# Patient Record
Sex: MALE | Race: BLACK OR AFRICAN AMERICAN | NOT HISPANIC OR LATINO | Employment: FULL TIME | URBAN - METROPOLITAN AREA
[De-identification: names, ages, dates, MRNs, and addresses within clinical notes are randomized per-mention and may not be internally consistent; named-entity substitution may affect disease eponyms.]

---

## 2021-03-25 DIAGNOSIS — Z23 ENCOUNTER FOR IMMUNIZATION: ICD-10-CM

## 2021-03-29 ENCOUNTER — IMMUNIZATIONS (OUTPATIENT)
Dept: FAMILY MEDICINE CLINIC | Facility: HOSPITAL | Age: 56
End: 2021-03-29

## 2021-03-29 DIAGNOSIS — Z23 ENCOUNTER FOR IMMUNIZATION: Primary | ICD-10-CM

## 2021-03-29 PROCEDURE — 91300 SARS-COV-2 / COVID-19 MRNA VACCINE (PFIZER-BIONTECH) 30 MCG: CPT

## 2021-03-29 PROCEDURE — 0001A SARS-COV-2 / COVID-19 MRNA VACCINE (PFIZER-BIONTECH) 30 MCG: CPT

## 2021-04-19 ENCOUNTER — IMMUNIZATIONS (OUTPATIENT)
Dept: FAMILY MEDICINE CLINIC | Facility: HOSPITAL | Age: 56
End: 2021-04-19

## 2021-04-19 DIAGNOSIS — Z23 ENCOUNTER FOR IMMUNIZATION: Primary | ICD-10-CM

## 2021-04-19 PROCEDURE — 0002A SARS-COV-2 / COVID-19 MRNA VACCINE (PFIZER-BIONTECH) 30 MCG: CPT

## 2021-04-19 PROCEDURE — 91300 SARS-COV-2 / COVID-19 MRNA VACCINE (PFIZER-BIONTECH) 30 MCG: CPT

## 2021-05-10 ENCOUNTER — CONSULT (OUTPATIENT)
Dept: SURGERY | Facility: CLINIC | Age: 56
End: 2021-05-10
Payer: COMMERCIAL

## 2021-05-10 VITALS — TEMPERATURE: 98.2 F | BODY MASS INDEX: 27.4 KG/M2 | HEIGHT: 69 IN | WEIGHT: 185 LBS

## 2021-05-10 DIAGNOSIS — K40.90 RIGHT INGUINAL HERNIA: Primary | ICD-10-CM

## 2021-05-10 PROCEDURE — 99202 OFFICE O/P NEW SF 15 MIN: CPT | Performed by: SURGERY

## 2021-05-10 RX ORDER — METHOCARBAMOL 500 MG/1
500 TABLET, FILM COATED ORAL 4 TIMES DAILY
COMMUNITY

## 2021-05-10 NOTE — PROGRESS NOTES
Office Visit - General Surgery  Anibal Carrillo MRN: [id number]  Encounter: [id number]    Assessment and Plan    54 yo M with PMH of HTN who presents with a right inguinal hernia    Plan: Will proceed with open right inguinal hernia repair at earliest mutual convenience    Chief Complaint:  Anibal Carrillo is a 55 y o  male who presents for Hernia (Consult right inguinal hernia )    Subjective  Patient states he has had pain in his right groin for 6 months  This has been intermittent and worsening over that time frame  It is worse when active and improves with rest/lying flat  He denies obstructive symptoms, skin changes or a palpable lump in the groin  Past Medical History  HTN  No other pertinent past medical history  Past Surgical History  History reviewed  No pertinent surgical history  Family History  History reviewed  No pertinent family history      Social History  Social History     Socioeconomic History    Marital status: Single     Spouse name: None    Number of children: None    Years of education: None    Highest education level: None   Occupational History    None   Social Needs    Financial resource strain: None    Food insecurity     Worry: None     Inability: None    Transportation needs     Medical: None     Non-medical: None   Tobacco Use    Smoking status: Current Some Day Smoker    Smokeless tobacco: Never Used   Substance and Sexual Activity    Alcohol use: Yes     Frequency: 4 or more times a week     Drinks per session: 1 or 2     Binge frequency: Less than monthly    Drug use: Never    Sexual activity: None   Lifestyle    Physical activity     Days per week: None     Minutes per session: None    Stress: None   Relationships    Social connections     Talks on phone: None     Gets together: None     Attends Denominational service: None     Active member of club or organization: None     Attends meetings of clubs or organizations: None     Relationship status: None    Intimate partner violence     Fear of current or ex partner: None     Emotionally abused: None     Physically abused: None     Forced sexual activity: None   Other Topics Concern    None   Social History Narrative    None        Medications  Current Outpatient Medications on File Prior to Visit   Medication Sig Dispense Refill    methocarbamol (ROBAXIN) 500 mg tablet Take 500 mg by mouth 4 (four) times a day       No current facility-administered medications on file prior to visit  Allergies  No Known Allergies    Review of Systems   Constitutional: Negative  HENT: Negative  Eyes: Negative  Respiratory: Negative  Cardiovascular: Negative  Gastrointestinal:        Right groin pain   Endocrine: Negative  Genitourinary: Negative  Musculoskeletal: Negative  Skin: Negative  Neurological: Negative  Psychiatric/Behavioral: Negative  Objective  Vitals:    05/10/21 0922   Temp: 98 2 °F (36 8 °C)       Physical Exam  Constitutional:       Appearance: Normal appearance  HENT:      Head: Normocephalic and atraumatic  Right Ear: External ear normal       Left Ear: External ear normal       Nose: Nose normal       Mouth/Throat:      Mouth: Mucous membranes are moist       Pharynx: Oropharynx is clear  Eyes:      Extraocular Movements: Extraocular movements intact  Conjunctiva/sclera: Conjunctivae normal       Pupils: Pupils are equal, round, and reactive to light  Neck:      Musculoskeletal: Normal range of motion  Cardiovascular:      Rate and Rhythm: Normal rate and regular rhythm  Pulses: Normal pulses  Pulmonary:      Effort: Pulmonary effort is normal    Abdominal:      General: Abdomen is flat  Palpations: Abdomen is soft  Hernia: A hernia (Right inguinal hernia) is present  Musculoskeletal: Normal range of motion  Skin:     General: Skin is warm and dry  Neurological:      General: No focal deficit present        Mental Status: He is alert and oriented to person, place, and time     Psychiatric:         Mood and Affect: Mood normal          Behavior: Behavior normal

## 2021-05-10 NOTE — H&P (VIEW-ONLY)
Office Visit - General Surgery  Gracie Zamorano MRN: [id number]  Encounter: [id number]    Assessment and Plan    56 yo M with PMH of HTN who presents with a right inguinal hernia    Plan: Will proceed with open right inguinal hernia repair at earliest mutual convenience    Chief Complaint:  Gracie Zamorano is a 55 y o  male who presents for Hernia (Consult right inguinal hernia )    Subjective  Patient states he has had pain in his right groin for 6 months  This has been intermittent and worsening over that time frame  It is worse when active and improves with rest/lying flat  He denies obstructive symptoms, skin changes or a palpable lump in the groin  Past Medical History  HTN  No other pertinent past medical history  Past Surgical History  History reviewed  No pertinent surgical history  Family History  History reviewed  No pertinent family history      Social History  Social History     Socioeconomic History    Marital status: Single     Spouse name: None    Number of children: None    Years of education: None    Highest education level: None   Occupational History    None   Social Needs    Financial resource strain: None    Food insecurity     Worry: None     Inability: None    Transportation needs     Medical: None     Non-medical: None   Tobacco Use    Smoking status: Current Some Day Smoker    Smokeless tobacco: Never Used   Substance and Sexual Activity    Alcohol use: Yes     Frequency: 4 or more times a week     Drinks per session: 1 or 2     Binge frequency: Less than monthly    Drug use: Never    Sexual activity: None   Lifestyle    Physical activity     Days per week: None     Minutes per session: None    Stress: None   Relationships    Social connections     Talks on phone: None     Gets together: None     Attends Mu-ism service: None     Active member of club or organization: None     Attends meetings of clubs or organizations: None     Relationship status: None    Intimate partner violence     Fear of current or ex partner: None     Emotionally abused: None     Physically abused: None     Forced sexual activity: None   Other Topics Concern    None   Social History Narrative    None        Medications  Current Outpatient Medications on File Prior to Visit   Medication Sig Dispense Refill    methocarbamol (ROBAXIN) 500 mg tablet Take 500 mg by mouth 4 (four) times a day       No current facility-administered medications on file prior to visit  Allergies  No Known Allergies    Review of Systems   Constitutional: Negative  HENT: Negative  Eyes: Negative  Respiratory: Negative  Cardiovascular: Negative  Gastrointestinal:        Right groin pain   Endocrine: Negative  Genitourinary: Negative  Musculoskeletal: Negative  Skin: Negative  Neurological: Negative  Psychiatric/Behavioral: Negative  Objective  Vitals:    05/10/21 0922   Temp: 98 2 °F (36 8 °C)       Physical Exam  Constitutional:       Appearance: Normal appearance  HENT:      Head: Normocephalic and atraumatic  Right Ear: External ear normal       Left Ear: External ear normal       Nose: Nose normal       Mouth/Throat:      Mouth: Mucous membranes are moist       Pharynx: Oropharynx is clear  Eyes:      Extraocular Movements: Extraocular movements intact  Conjunctiva/sclera: Conjunctivae normal       Pupils: Pupils are equal, round, and reactive to light  Neck:      Musculoskeletal: Normal range of motion  Cardiovascular:      Rate and Rhythm: Normal rate and regular rhythm  Pulses: Normal pulses  Pulmonary:      Effort: Pulmonary effort is normal    Abdominal:      General: Abdomen is flat  Palpations: Abdomen is soft  Hernia: A hernia (Right inguinal hernia) is present  Musculoskeletal: Normal range of motion  Skin:     General: Skin is warm and dry  Neurological:      General: No focal deficit present        Mental Status: He is alert and oriented to person, place, and time     Psychiatric:         Mood and Affect: Mood normal          Behavior: Behavior normal

## 2021-05-17 ENCOUNTER — ANESTHESIA EVENT (OUTPATIENT)
Dept: PERIOP | Facility: HOSPITAL | Age: 56
End: 2021-05-17
Payer: COMMERCIAL

## 2021-05-18 ENCOUNTER — HOSPITAL ENCOUNTER (OUTPATIENT)
Facility: HOSPITAL | Age: 56
Setting detail: OUTPATIENT SURGERY
Discharge: HOME | End: 2021-05-18
Attending: SURGERY | Admitting: SURGERY
Payer: COMMERCIAL

## 2021-05-18 ENCOUNTER — ANESTHESIA (OUTPATIENT)
Dept: PERIOP | Facility: HOSPITAL | Age: 56
End: 2021-05-18
Payer: COMMERCIAL

## 2021-05-18 VITALS
RESPIRATION RATE: 16 BRPM | WEIGHT: 189 LBS | BODY MASS INDEX: 27.99 KG/M2 | HEIGHT: 69 IN | OXYGEN SATURATION: 94 % | SYSTOLIC BLOOD PRESSURE: 183 MMHG | TEMPERATURE: 98.7 F | DIASTOLIC BLOOD PRESSURE: 93 MMHG | HEART RATE: 77 BPM

## 2021-05-18 DIAGNOSIS — K40.90 RIGHT INGUINAL HERNIA: Primary | ICD-10-CM

## 2021-05-18 PROCEDURE — 49505 PRP I/HERN INIT REDUC >5 YR: CPT | Performed by: SURGERY

## 2021-05-18 PROCEDURE — C1781 MESH (IMPLANTABLE): HCPCS | Performed by: SURGERY

## 2021-05-18 DEVICE — BARD MESH PERFIX PLUG, MEDIUM
Type: IMPLANTABLE DEVICE | Site: INGUINAL | Status: FUNCTIONAL
Brand: BARD MESH PERFIX PLUG

## 2021-05-18 RX ORDER — ONDANSETRON 2 MG/ML
4 INJECTION INTRAMUSCULAR; INTRAVENOUS ONCE AS NEEDED
Status: DISCONTINUED | OUTPATIENT
Start: 2021-05-18 | End: 2021-05-18 | Stop reason: HOSPADM

## 2021-05-18 RX ORDER — HYDROMORPHONE HCL/PF 1 MG/ML
SYRINGE (ML) INJECTION AS NEEDED
Status: DISCONTINUED | OUTPATIENT
Start: 2021-05-18 | End: 2021-05-18

## 2021-05-18 RX ORDER — SODIUM CHLORIDE, SODIUM LACTATE, POTASSIUM CHLORIDE, CALCIUM CHLORIDE 600; 310; 30; 20 MG/100ML; MG/100ML; MG/100ML; MG/100ML
INJECTION, SOLUTION INTRAVENOUS CONTINUOUS PRN
Status: DISCONTINUED | OUTPATIENT
Start: 2021-05-18 | End: 2021-05-18

## 2021-05-18 RX ORDER — ONDANSETRON 2 MG/ML
INJECTION INTRAMUSCULAR; INTRAVENOUS AS NEEDED
Status: DISCONTINUED | OUTPATIENT
Start: 2021-05-18 | End: 2021-05-18

## 2021-05-18 RX ORDER — FENTANYL CITRATE/PF 50 MCG/ML
50 SYRINGE (ML) INJECTION
Status: DISCONTINUED | OUTPATIENT
Start: 2021-05-18 | End: 2021-05-18 | Stop reason: HOSPADM

## 2021-05-18 RX ORDER — BUPIVACAINE HYDROCHLORIDE 5 MG/ML
INJECTION, SOLUTION EPIDURAL; INTRACAUDAL AS NEEDED
Status: DISCONTINUED | OUTPATIENT
Start: 2021-05-18 | End: 2021-05-18 | Stop reason: HOSPADM

## 2021-05-18 RX ORDER — LABETALOL 20 MG/4 ML (5 MG/ML) INTRAVENOUS SYRINGE
10 EVERY 4 HOURS PRN
Status: DISCONTINUED | OUTPATIENT
Start: 2021-05-18 | End: 2021-05-18 | Stop reason: HOSPADM

## 2021-05-18 RX ORDER — KETOROLAC TROMETHAMINE 30 MG/ML
INJECTION, SOLUTION INTRAMUSCULAR; INTRAVENOUS AS NEEDED
Status: DISCONTINUED | OUTPATIENT
Start: 2021-05-18 | End: 2021-05-18

## 2021-05-18 RX ORDER — CEFAZOLIN SODIUM 1 G/3ML
INJECTION, POWDER, FOR SOLUTION INTRAMUSCULAR; INTRAVENOUS AS NEEDED
Status: DISCONTINUED | OUTPATIENT
Start: 2021-05-18 | End: 2021-05-18

## 2021-05-18 RX ORDER — AMLODIPINE BESYLATE 10 MG/1
10 TABLET ORAL DAILY
COMMUNITY

## 2021-05-18 RX ORDER — SODIUM CHLORIDE, SODIUM LACTATE, POTASSIUM CHLORIDE, CALCIUM CHLORIDE 600; 310; 30; 20 MG/100ML; MG/100ML; MG/100ML; MG/100ML
125 INJECTION, SOLUTION INTRAVENOUS CONTINUOUS
Status: DISCONTINUED | OUTPATIENT
Start: 2021-05-18 | End: 2021-05-18 | Stop reason: HOSPADM

## 2021-05-18 RX ORDER — DEXAMETHASONE SODIUM PHOSPHATE 10 MG/ML
INJECTION, SOLUTION INTRAMUSCULAR; INTRAVENOUS AS NEEDED
Status: DISCONTINUED | OUTPATIENT
Start: 2021-05-18 | End: 2021-05-18

## 2021-05-18 RX ORDER — MAGNESIUM HYDROXIDE 1200 MG/15ML
LIQUID ORAL AS NEEDED
Status: DISCONTINUED | OUTPATIENT
Start: 2021-05-18 | End: 2021-05-18 | Stop reason: HOSPADM

## 2021-05-18 RX ORDER — OXYCODONE HYDROCHLORIDE 5 MG/1
5 TABLET ORAL ONCE
Status: COMPLETED | OUTPATIENT
Start: 2021-05-18 | End: 2021-05-18

## 2021-05-18 RX ORDER — PROPOFOL 10 MG/ML
INJECTION, EMULSION INTRAVENOUS AS NEEDED
Status: DISCONTINUED | OUTPATIENT
Start: 2021-05-18 | End: 2021-05-18

## 2021-05-18 RX ORDER — FENTANYL CITRATE 50 UG/ML
INJECTION, SOLUTION INTRAMUSCULAR; INTRAVENOUS AS NEEDED
Status: DISCONTINUED | OUTPATIENT
Start: 2021-05-18 | End: 2021-05-18

## 2021-05-18 RX ORDER — LIDOCAINE HYDROCHLORIDE 10 MG/ML
0.5 INJECTION, SOLUTION EPIDURAL; INFILTRATION; INTRACAUDAL; PERINEURAL ONCE AS NEEDED
Status: COMPLETED | OUTPATIENT
Start: 2021-05-18 | End: 2021-05-18

## 2021-05-18 RX ORDER — OXYCODONE HYDROCHLORIDE 5 MG/1
5 TABLET ORAL EVERY 6 HOURS PRN
Qty: 30 TABLET | Refills: 0 | Status: SHIPPED | OUTPATIENT
Start: 2021-05-18 | End: 2021-05-28

## 2021-05-18 RX ADMIN — HYDROMORPHONE HYDROCHLORIDE 0.5 MG: 1 INJECTION, SOLUTION INTRAMUSCULAR; INTRAVENOUS; SUBCUTANEOUS at 13:46

## 2021-05-18 RX ADMIN — ONDANSETRON 4 MG: 2 INJECTION INTRAMUSCULAR; INTRAVENOUS at 13:43

## 2021-05-18 RX ADMIN — FENTANYL CITRATE 25 MCG: 50 INJECTION INTRAMUSCULAR; INTRAVENOUS at 13:16

## 2021-05-18 RX ADMIN — CEFAZOLIN 2000 MG: 1 INJECTION, POWDER, FOR SOLUTION INTRAMUSCULAR; INTRAVENOUS at 13:28

## 2021-05-18 RX ADMIN — KETOROLAC TROMETHAMINE 30 MG: 30 INJECTION, SOLUTION INTRAMUSCULAR at 14:27

## 2021-05-18 RX ADMIN — OXYCODONE HYDROCHLORIDE 5 MG: 5 TABLET ORAL at 16:14

## 2021-05-18 RX ADMIN — SODIUM CHLORIDE, SODIUM LACTATE, POTASSIUM CHLORIDE, AND CALCIUM CHLORIDE 125 ML/HR: .6; .31; .03; .02 INJECTION, SOLUTION INTRAVENOUS at 12:00

## 2021-05-18 RX ADMIN — SODIUM CHLORIDE, SODIUM LACTATE, POTASSIUM CHLORIDE, AND CALCIUM CHLORIDE: .6; .31; .03; .02 INJECTION, SOLUTION INTRAVENOUS at 13:16

## 2021-05-18 RX ADMIN — PROPOFOL 200 MG: 10 INJECTION, EMULSION INTRAVENOUS at 13:16

## 2021-05-18 RX ADMIN — LIDOCAINE HYDROCHLORIDE 0.5 ML: 10 INJECTION, SOLUTION EPIDURAL; INFILTRATION; INTRACAUDAL; PERINEURAL at 12:00

## 2021-05-18 RX ADMIN — LABETALOL 20 MG/4 ML (5 MG/ML) INTRAVENOUS SYRINGE 10 MG: at 15:13

## 2021-05-18 RX ADMIN — DEXAMETHASONE SODIUM PHOSPHATE 10 MG: 10 INJECTION, SOLUTION INTRAMUSCULAR; INTRAVENOUS at 13:43

## 2021-05-18 RX ADMIN — FENTANYL CITRATE 75 MCG: 50 INJECTION INTRAMUSCULAR; INTRAVENOUS at 13:31

## 2021-05-18 NOTE — OP NOTE
OPERATIVE REPORT  PATIENT NAME: Frederick Barry    :  1965  MRN: [id number]  Pt Location: [facility] OR ROOM 08    SURGERY DATE: 2021    Surgeon(s) and Role:     * Efe Balderas DO - Primary     Cecilia Han MD - Assisting    Preop Diagnosis:  Right inguinal hernia [K40 90]    Post-Op Diagnosis Codes:     * Right inguinal hernia [K40 90]    Procedure(s) (LRB):  REPAIR HERNIA INGUINAL (Right)    Specimen(s):  * No specimens in log *    Estimated Blood Loss:   Minimal    Drains:  * No LDAs found *    Anesthesia Type:   General    Operative Indications:  Right inguinal hernia [K40 90]      Operative Findings:  1 cm indirect hernia, 4cm cord lipoma    Complications:   None    Procedure and Technique:  Patient was brought into the operating room and placed supine on the table  Identity and procedure were confirmed and general anesthesia was induced  The patient was prepped with ChloraPrep and draped in the usual sterile manner with marking visible in the field  Time-out was performed and all parties were in agreement  Oblique skin incision was made 2 cm above the inguinal ligament on the right side using a 15 blade scalpel  Subcutaneous tissues were bluntly dissected down to Scarpas fascia and hemostasis was achieved using electrocautery  Shun's was opened sharply using Metzenbaum scissors  Blunt dissection was used down to the external oblique fibers  Dissection was carried inferomedially to expose the external ring  The external oblique fibers were then opened using Metzenbaum scissors and carried inferiorly down to the level of the external ring taking care not to transect the ilioinguinal nerve  The cord structures were then bluntly  from the transversalis floor and encircled with a Penrose drain  4 cm cord lipoma was noted  Cord structures were then retracted laterally and cremasterics were dissected off the cord structures superolaterally towards the internal ring    A 1 cm indirect hernia was identified within the inguinal canal   This was dissected free of the cord structures and reduced back into the abdomen  Cord lipoma was transected at its base off of the cord  Transversalis floor  Lacks services reinforced by approximating and to the lower part of the shelving edge of the inguinal ligament using interrupted 0 Vicryl sutures  Medium polypropylene plug was inserted into the defect  This was secured to transversalis floor using 0 Vicryl interrupted sutures  Polypropylene mesh was inserted and secured medially to the pubic tubercle using 0 Vicryl  This is laid flat on the transversalis floor and the superior and inferior lateral edges of the mesh were approximated using 0 Vicryl suture  The mesh was secured superiorly to the transversalis floor and inferiorly to the shelving edge of the inguinal ligament  Wound was copiously irrigated with normal saline  Cut edges of the external oblique were reapproximated using 0 Vicryl in a running fashion  Wound was once again irrigated with normal saline  Cut edges of Shun's fascia was reapproximated using 3 0 Vicryl suture in a running fashion  Skin was closed using 4 0 Monocryl in a running subcuticular fashion  Exofin was applied to the wound  Ilioinguinal nerve block was performed using 0 25% Marcaine  The patient was awakened in the operating room and LMA was removed without incident  Patient was then returned to the PACU in stable condition  Dr Christine Cortez was present for the entire procedure      Patient Disposition:  PACU     SIGNATURE: Chari Hinkle MD  DATE: May 18, 2021  TIME: 2:45 PM

## 2021-05-18 NOTE — INTERVAL H&P NOTE
H&P reviewed  After examining the patient I find no changes in the patients condition since the H&P had been written      Vitals:    05/18/21 1140   BP: 153/88   Pulse: 67   Resp: 16   Temp: 97 8 °F (36 6 °C)   SpO2: 97%

## 2021-05-18 NOTE — ANESTHESIA POSTPROCEDURE EVALUATION
Post-Op Assessment Note    CV Status:  Stable  Pain Score: 0    Pain management: adequate     Mental Status:  Alert and awake   Hydration Status:  Euvolemic   PONV Controlled:  Controlled   Airway Patency:  Patent      Post Op Vitals Reviewed: Yes      Staff: CRNA, Anesthesiologist         No complications documented      BP (!) 187/87 (05/18/21 1445)    Temp   98 2   Pulse 66 (05/18/21 1445)   Resp 12 (05/18/21 1445)    SpO2 99 % (05/18/21 1445)

## 2021-05-18 NOTE — PERIOPERATIVE NURSING NOTE
VSS, pt denies nausea, reports mild pain, report called, no questions, pt transferred to [facility]

## 2021-05-18 NOTE — DISCHARGE INSTRUCTIONS
Please follow-up as scheduled      Activity:  - No lifting greater than 20 pounds or strenuous physical activity or exercise for 2 weeks  - Walking and normal light activities are encouraged  - Normal daily activities including climbing steps are okay  - No driving until no longer using pain medications      Diet:    - You may resume your normal diet      Wound Care:  - May shower daily  No tub baths or swimming until cleared by your surgeon   - Wash incision gently with soap and water and pat dry  - Do not apply any creams or ointments unless instructed to do so by your surgeon   - Chay Monzon may apply ice as needed (no longer than 20 minutes at a time) for the first 48 hours  - Bruising is not unusual and will go away with a little time  You may apply a warm, moist compress that may help the bruising resolve quicker  - You may remove the dressings the day after surgery (unless otherwise instructed)  Leave any skin tapes (steri-strips) on the incision(s) in place until they fall off on their own  Any new dressings are optional      Medications:    - You may resume all of your regular medications, including blood thinners and aspirin, after going home unless otherwise instructed  Please refer to your discharge medication list for further details  - Please take the pain medications as directed  - You are encouraged to use non-narcotic pain medications first and whenever possible  Reserve the use of narcotic pain medication for moderate to severe pain not controlled by non-narcotic medications   - No driving while taking narcotic pain medications  - You may become constipated, especially if taking pain medications  You may take any over the counter stool softeners or laxatives as needed  Examples: Milk of Magnesia, Colace, Senna      Additional Instructions:  - If you have any questions or concerns after discharge please call the office   - Call office or return to ER if fever greater than 101, chills, persistent nausea/vomiting, worsening/uncontrollable pain, and/or increasing redness or purulent/foul smelling drainage from incision(s)  How to Stop Smoking   WHAT YOU NEED TO KNOW:   You will improve your health and the health of others around you if you stop smoking  Your risk for heart and lung disease, cancer, stroke, heart attack, and vision problems will also decrease  Your adolescent can help prevent or stop harm to his or her brain or body  This will help him or her become a healthy adult  You can benefit from quitting no matter how long you have smoked  DISCHARGE INSTRUCTIONS:   Prepare to stop smoking:  Nicotine is a highly addictive drug found in cigarettes  Withdrawal symptoms can happen when you stop smoking and make it hard to quit  These include anxiety, depression, irritability, trouble sleeping, and increased appetite  You increase your chances of success if you prepare to quit  · Set a quit date  Sharri Sciara a date that is within the next 2 weeks  Do not pick a day that you think may be stressful or busy  Write down the day or [place] it on your calender  · Tell friends and family that you plan to quit  Explain that you may have withdrawal symptoms when you try to quit  Ask them to support you  They may be able to encourage you and help reduce your stress to make it easier for you to quit  · Make a list of your reasons for quitting  Put the list somewhere you will see it every day, such as your refrigerator  You can look at the list when you have a craving  · Remove all tobacco and nicotine products from your home, car, and workplace  Also, remove anything else that will tempt you to smoke, such as lighters, matches, or ashtrays  Clean your car, home, and places at work that smell like smoke  The smell of smoke can trigger a craving  · Identify triggers that make you want to smoke  This may include activities, feelings, or people   Also write down 1 way you can deal with each of your triggers  For example, if you want to smoke as soon as you wake up, plan another activity during this time, such as exercise  · Make a plan for how you will quit  Learn about the tools that can help you quit, such as medicine, counseling, or nicotine replacement therapy  Choose at least 2 options to help you quit  · Help your adolescent make a plan to quit  The plan will be more successful if your adolescent makes his or her own decisions  Do not try to pressure him or her to quit immediately or in a certain way  Be supportive and offer help if needed  Tools to help you stop smoking:   · Counseling  from a trained healthcare provider can provide you with support and skills to quit smoking  The provider will also teach you to manage your withdrawal symptoms and cravings  You may receive counseling from one counselor, in group therapy, or through phone therapy called a quit line  · Nicotine replacement therapy (NRT)  such as nicotine patches, gum, or lozenges may help reduce your nicotine cravings  You may get these without a doctor's order  Do not use e-cigarettes or smokeless tobacco in place of cigarettes or to help you quit  They still contain nicotine  · Prescription medicines  such as nasal sprays or nicotine inhalers may help reduce your withdrawal symptoms  Other medicines may also be used to reduce your urge to smoke  Ask your healthcare provider about these medicines  You may need to start certain medicines 2 weeks before your quit date for them to work well  · Hypnosis  is a practice that helps guide you through thoughts and feelings  Hypnosis may help decrease your cravings and make you more willing to quit  · Acupuncture therapy  uses very thin needles to balance energy channels in the body  This is thought to help decrease cravings and symptoms of nicotine withdrawal     · Support groups  let you talk to others who are trying to quit or have already quit   It may be helpful to speak with others about how they quit  Manage your cravings:   · Avoid situations, people, and places that tempt you to smoke  Go to nonsmoking places, such as libraries or restaurants  Understand what tempts you and try to avoid these things  · Keep your hands busy  Hold things such as a stress ball or pen  · Put candy or toothpicks in your mouth  Keep lollipops, sugarless gum, or toothpicks with you at all times  · Do not have alcohol or caffeine  These drinks may tempt you to smoke  Drink healthy liquids such as water or juice instead  · Reward yourself when you resist your cravings  Rewards will motivate you and help you stay positive  · Do an activity that distracts you from your craving  Examples include cleaning, creating art, or gardening  Prevent weight gain after you quit:  You may gain a few pounds after you quit smoking  It is healthier for you to gain a few pounds than to continue to smoke  The following can help you prevent weight gain:  · Eat healthy foods  These include fruits, vegetables, whole-grain breads, low-fat dairy products, beans, lean meats, and fish  Eat healthy snacks, such as low-fat yogurt, if you get hungry between meals  · Drink water before, during, and between meals  This will make your stomach feel full and help prevent you from overeating  Ask your healthcare provider how much liquid to drink each day and which liquids are best for you  · Be physically active  Activity may help reduce your cravings and reduce stress  Take a walk or do some kind of physical activity every day  Ask your healthcare provider which activities are right for you  For support and more information:   · Smokefree  gov  Phone: 0- 686 - 351-1462  Web Address: www smokefree  [place] [employer] Information is for End User's use only and may not be sold, redistributed or otherwise used for commercial purposes   All illustrations and images included in CareNotes® are the copyrighted property of [employer]  or [employer]   The above information is an  only  It is not intended as medical advice for individual conditions or treatments  Talk to your doctor, nurse or pharmacist before following any medical regimen to see if it is safe and effective for you  Patient/Caregiver provided printed discharge information.

## 2021-05-18 NOTE — PERIOPERATIVE NURSING NOTE
VSS, pt denies pain or nausea, assessment unchanged, report called, no questions, pt transferred to [facility]

## 2021-05-18 NOTE — ANESTHESIA PREPROCEDURE EVALUATION
Procedure:  REPAIR HERNIA INGUINAL (Right Groin)    Relevant Problems   No relevant active problems      HTN  Right inguinal hernia  Current smoker      Physical Exam    Airway    Mallampati score: II  TM Distance: >3 FB  Neck ROM: full     Dental   No notable dental hx     Cardiovascular      Pulmonary      Other Findings        Anesthesia Plan  ASA Score- 2     Anesthesia Type- general with ASA Monitors  Additional Monitors:   Airway Plan: LMA  Comment: GA with LMA, IV, antiemetics  Plan Factors-    Chart reviewed  Existing labs reviewed  Patient summary reviewed  Patient is not a current smoker  Patient did not smoke on day of surgery  Induction- intravenous  Postoperative Plan-   Planned trial extubation    Informed Consent- Anesthetic plan and risks discussed with patient  I personally reviewed this patient with the CRNA  Discussed and agreed on the Anesthesia Plan with the CRNA  Ghulam Drake

## 2021-06-01 ENCOUNTER — OFFICE VISIT (OUTPATIENT)
Dept: SURGERY | Facility: CLINIC | Age: 56
End: 2021-06-01

## 2021-06-01 VITALS — HEIGHT: 69 IN | WEIGHT: 189.6 LBS | BODY MASS INDEX: 28.08 KG/M2 | TEMPERATURE: 97.7 F

## 2021-06-01 DIAGNOSIS — Z87.19 STATUS POST RIGHT INGUINAL HERNIA REPAIR: Primary | ICD-10-CM

## 2021-06-01 DIAGNOSIS — Z98.890 STATUS POST RIGHT INGUINAL HERNIA REPAIR: Primary | ICD-10-CM

## 2021-06-01 PROBLEM — K40.90 RIGHT INGUINAL HERNIA: Status: RESOLVED | Noted: 2021-05-10 | Resolved: 2021-06-01

## 2021-06-01 PROCEDURE — 99024 POSTOP FOLLOW-UP VISIT: CPT | Performed by: SURGERY

## 2021-06-01 PROCEDURE — 3008F BODY MASS INDEX DOCD: CPT | Performed by: SURGERY

## 2021-06-01 NOTE — ASSESSMENT & PLAN NOTE
Doing fairly well  I told he needs to be up and moving a lot more  Back to work on the 14th with no restrictions  See back if needed

## 2021-06-01 NOTE — PROGRESS NOTES
Office Visit - General Surgery  Tolu Regalado MRN: [id number]  Encounter: [id number]    Assessment and Plan    Problem List Items Addressed This Visit        Other    Status post right inguinal hernia repair - Primary      Doing fairly well  I told he needs to be up and moving a lot more  Back to work on the 14th with no restrictions  See back if needed  Chief Complaint:  Tolu Regalado is a 55 y o  male who presents for Post-op (p/o right inguinal hernia repair  Patient states area of surgery feels fine )    Subjective   61-year-old male status post right inguinal hernia repair  As still having some discomfort and pain  He has been taking it easy not doing much of anything since surgery      Past Medical History  Past Medical History:   Diagnosis Date    Hypertension        Past Surgical History  Past Surgical History:   Procedure Laterality Date    MA REPAIR ING HERNIA,5+Y/O,REDUCIBL Right 5/18/2021    Procedure: REPAIR HERNIA INGUINAL;  Surgeon: Regi Bronson DO;  Location: [facility];  Service: General       Family History  Family History   Problem Relation Age of Onset    No Known Problems Mother     No Known Problems Father        Social History  Social History     Socioeconomic History    Marital status: Single     Spouse name: None    Number of children: None    Years of education: None    Highest education level: None   Occupational History    None   Social Needs    Financial resource strain: None    Food insecurity     Worry: None     Inability: None    Transportation needs     Medical: None     Non-medical: None   Tobacco Use    Smoking status: Current Some Day Smoker    Smokeless tobacco: Never Used   Substance and Sexual Activity    Alcohol use: Yes     Frequency: 4 or more times a week     Drinks per session: 1 or 2     Binge frequency: Less than monthly    Drug use: Never    Sexual activity: None   Lifestyle    Physical activity     Days per week: None Minutes per session: None    Stress: None   Relationships    Social connections     Talks on phone: None     Gets together: None     Attends Mu-ism service: None     Active member of club or organization: None     Attends meetings of clubs or organizations: None     Relationship status: None    Intimate partner violence     Fear of current or ex partner: None     Emotionally abused: None     Physically abused: None     Forced sexual activity: None   Other Topics Concern    None   Social History Narrative    None        Medications  Current Outpatient Medications on File Prior to Visit   Medication Sig Dispense Refill    amLODIPine (NORVASC) 10 mg tablet Take 10 mg by mouth daily      methocarbamol (ROBAXIN) 500 mg tablet Take 500 mg by mouth 4 (four) times a day       No current facility-administered medications on file prior to visit          Allergies  Not on File    Review of Systems    Objective  Vitals:    06/01/21 1039   Temp: 97 7 °F (36 5 °C)       Physical Exam     Abdomen: Right groin incision healing well typical healing ridge, soft nontender

## 2021-06-01 NOTE — LETTER
June 1, 2021     Patient: Francisco Thomas   YOB: 1965   Date of Visit: 6/1/2021       To Whom it May Concern:    Francisco Thomas is under my professional care  He was seen in my office on 6/1/2021  He may return to work on June 14th with no restrictions  If you have any questions or concerns, please don't hesitate to call           Sincerely,          Nisha Batista MD        CC: No Recipients

## 2021-07-30 ENCOUNTER — OFFICE VISIT (OUTPATIENT)
Dept: SURGERY | Facility: CLINIC | Age: 56
End: 2021-07-30

## 2021-07-30 VITALS — HEART RATE: 80 BPM | WEIGHT: 183 LBS | BODY MASS INDEX: 27.02 KG/M2 | TEMPERATURE: 98.2 F

## 2021-07-30 DIAGNOSIS — Z87.19 STATUS POST RIGHT INGUINAL HERNIA REPAIR: Primary | ICD-10-CM

## 2021-07-30 DIAGNOSIS — Z98.890 STATUS POST RIGHT INGUINAL HERNIA REPAIR: Primary | ICD-10-CM

## 2021-07-30 PROCEDURE — 99024 POSTOP FOLLOW-UP VISIT: CPT | Performed by: SURGERY

## 2021-07-30 RX ORDER — GABAPENTIN 300 MG/1
300 CAPSULE ORAL 3 TIMES DAILY
Qty: 21 CAPSULE | Refills: 0 | Status: SHIPPED | OUTPATIENT
Start: 2021-07-30

## 2021-07-30 NOTE — ASSESSMENT & PLAN NOTE
23-year-old male status post open right inguinal hernia repair approximately 2 months ago, now with persistent right groin pain  Plan:  While the patient does not clinically have any evidence of a recurrence of his hernia, he is having some persistent groin pain with heavy lifting at work  I would like to start a week of Neurontin to see if this helps with neuropathic pain, while keeping him on light duty at work  He will return in 1 week for a follow-up visit and to further delineate his pain when he is lifting  If he is having persistent pain, will obtain a right groin ultrasound to eval for recurrence verses mesh migration

## 2021-07-30 NOTE — PROGRESS NOTES
Assessment/Plan:    Status post right inguinal hernia repair  31-year-old male status post open right inguinal hernia repair approximately 2 months ago, now with persistent right groin pain  Plan:  While the patient does not clinically have any evidence of a recurrence of his hernia, he is having some persistent groin pain with heavy lifting at work  I would like to start a week of Neurontin to see if this helps with neuropathic pain, while keeping him on light duty at work  He will return in 1 week for a follow-up visit and to further delineate his pain when he is lifting  If he is having persistent pain, will obtain a right groin ultrasound to eval for recurrence verses mesh migration  Diagnoses and all orders for this visit:    Status post right inguinal hernia repair  -     gabapentin (NEURONTIN) 300 mg capsule; Take 1 capsule (300 mg total) by mouth 3 (three) times a day          Subjective:      Patient ID: Zackary Carrillo is a 55 y o  male  31-year-old male status post open right inguinal hernia repair with mesh on 05/18/2021 by Dr Valentino Jamison  Initially he did well and returned to work, however in the last several weeks he has had persistent right groin pain radiating to his testicle mostly at the end of days and with heavy lifting  He states that he is resting and at baseline he has no significant pain  However, his job involves significant lifting of heavy boxes and after about 6 hours in to work he begins to limp and feels significant pain radiating to his right testicle  He denies any bulge in the area, has been moving his bowels normally and urinating without difficulty  The following portions of the patient's history were reviewed and updated as appropriate:   He  has a past medical history of Hypertension    He   Patient Active Problem List    Diagnosis Date Noted    Status post right inguinal hernia repair 06/01/2021     He  has a past surgical history that includes pr repair ing hernia,5+y/o,reducibl (Right, 5/18/2021)  His family history includes No Known Problems in his father and mother  He  reports that he has been smoking  He has never used smokeless tobacco  He reports current alcohol use  He reports that he does not use drugs  Current Outpatient Medications   Medication Sig Dispense Refill    amLODIPine (NORVASC) 10 mg tablet Take 10 mg by mouth daily      methocarbamol (ROBAXIN) 500 mg tablet Take 500 mg by mouth 4 (four) times a day      gabapentin (NEURONTIN) 300 mg capsule Take 1 capsule (300 mg total) by mouth 3 (three) times a day 21 capsule 0     No current facility-administered medications for this visit  Current Outpatient Medications on File Prior to Visit   Medication Sig    amLODIPine (NORVASC) 10 mg tablet Take 10 mg by mouth daily    methocarbamol (ROBAXIN) 500 mg tablet Take 500 mg by mouth 4 (four) times a day     No current facility-administered medications on file prior to visit  He is allergic to pollen extract       Review of Systems   Constitutional: Negative for appetite change, chills, diaphoresis and fever  HENT: Negative for nosebleeds and trouble swallowing  Eyes: Negative  Respiratory: Negative for cough, shortness of breath and wheezing  Cardiovascular: Negative for chest pain, palpitations and leg swelling  Gastrointestinal: Negative for abdominal distention, abdominal pain, nausea and vomiting  Genitourinary: Negative for difficulty urinating, flank pain and frequency  Musculoskeletal: Negative for arthralgias, joint swelling and myalgias  Skin: Negative for pallor and rash  Neurological: Negative for dizziness, facial asymmetry and speech difficulty  Hematological: Does not bruise/bleed easily  Psychiatric/Behavioral: Negative for agitation and confusion  All other systems reviewed and are negative          Objective:      Pulse 80   Temp 98 2 °F (36 8 °C)   Wt 83 kg (183 lb)   BMI 27 02 kg/m² Physical Exam  Vitals and nursing note reviewed  Constitutional:       General: He is not in acute distress  Appearance: Normal appearance  He is not toxic-appearing  HENT:      Head: Normocephalic and atraumatic  Mouth/Throat:      Mouth: Mucous membranes are moist    Eyes:      Extraocular Movements: Extraocular movements intact  Pupils: Pupils are equal, round, and reactive to light  Cardiovascular:      Rate and Rhythm: Normal rate and regular rhythm  Pulses: Normal pulses  Pulmonary:      Effort: Pulmonary effort is normal  No respiratory distress  Breath sounds: Normal breath sounds  No wheezing  Abdominal:      General: There is no distension  Palpations: Abdomen is soft  There is no mass  Tenderness: There is abdominal tenderness  There is no guarding or rebound  Hernia: No hernia is present  Comments: Mild right groin tenderness, no evidence of recurrent hernia  Musculoskeletal:         General: No swelling or deformity  Normal range of motion  Cervical back: Normal range of motion and neck supple  Right lower leg: No edema  Left lower leg: No edema  Skin:     General: Skin is warm and dry  Coloration: Skin is not jaundiced  Neurological:      General: No focal deficit present  Mental Status: He is alert and oriented to person, place, and time     Psychiatric:         Mood and Affect: Mood normal          Behavior: Behavior normal

## 2021-12-15 ENCOUNTER — OFFICE VISIT (OUTPATIENT)
Dept: SURGERY | Facility: CLINIC | Age: 56
End: 2021-12-15
Payer: COMMERCIAL

## 2021-12-15 VITALS
HEART RATE: 69 BPM | DIASTOLIC BLOOD PRESSURE: 88 MMHG | WEIGHT: 185 LBS | TEMPERATURE: 97.3 F | BODY MASS INDEX: 27.32 KG/M2 | SYSTOLIC BLOOD PRESSURE: 166 MMHG

## 2021-12-15 DIAGNOSIS — Z98.890 STATUS POST RIGHT INGUINAL HERNIA REPAIR: ICD-10-CM

## 2021-12-15 DIAGNOSIS — Z87.19 STATUS POST RIGHT INGUINAL HERNIA REPAIR: ICD-10-CM

## 2021-12-15 PROCEDURE — 99213 OFFICE O/P EST LOW 20 MIN: CPT | Performed by: SURGERY

## 2021-12-15 RX ORDER — GABAPENTIN 300 MG/1
300 CAPSULE ORAL 3 TIMES DAILY
Qty: 42 CAPSULE | Refills: 0 | Status: SHIPPED | OUTPATIENT
Start: 2021-12-15 | End: 2022-01-04 | Stop reason: SDUPTHER

## 2021-12-22 ENCOUNTER — HOSPITAL ENCOUNTER (OUTPATIENT)
Dept: RADIOLOGY | Facility: HOSPITAL | Age: 56
Discharge: HOME | End: 2021-12-22
Attending: SURGERY
Payer: COMMERCIAL

## 2021-12-22 DIAGNOSIS — Z98.890 STATUS POST RIGHT INGUINAL HERNIA REPAIR: ICD-10-CM

## 2021-12-22 DIAGNOSIS — Z87.19 STATUS POST RIGHT INGUINAL HERNIA REPAIR: ICD-10-CM

## 2021-12-22 PROCEDURE — 76870 US EXAM SCROTUM: CPT

## 2022-01-04 ENCOUNTER — TELEPHONE (OUTPATIENT)
Dept: SURGERY | Facility: CLINIC | Age: 57
End: 2022-01-04

## 2022-01-04 DIAGNOSIS — Z87.19 STATUS POST RIGHT INGUINAL HERNIA REPAIR: Primary | ICD-10-CM

## 2022-01-04 DIAGNOSIS — Z98.890 STATUS POST RIGHT INGUINAL HERNIA REPAIR: Primary | ICD-10-CM

## 2022-01-04 RX ORDER — GABAPENTIN 300 MG/1
300 CAPSULE ORAL 3 TIMES DAILY
Qty: 42 CAPSULE | Refills: 0 | Status: SHIPPED | OUTPATIENT
Start: 2022-01-04

## 2022-01-04 NOTE — TELEPHONE ENCOUNTER
Gracie Zamorano called today, He had hernia surgery on 5/18/21 w/ a p/o appointment on 7/30/21 and 12/15/21  At the 12/15/21 visit you ordered an U/S scrotum and no one got back to him with his results  He states that he is still in pain  He said that he took 2 days off from work and wanted to know if we can give him an work excuse  I informed him I wasn't sure because he wasn't seen her for his pain  He also stated that you called over to [facility] Gabapentin on 12/15/21 300 mg  but it did not go through  Not sure he is telling the truth  Please advise  12:17 PM      For Roland  I reordered him gabapentin to the [facility] in [place]  I also placed a referral for him to see a pain specialist for his groin pain  His ultrasound didnt show anything concerning, but if hed like to come in to discuss it, I am happy to chat with him at a visit  I called Aye Chang to inform and he made an appointment for 1/7/22 w/ Dr Destini Page

## 2022-01-07 ENCOUNTER — OFFICE VISIT (OUTPATIENT)
Dept: SURGERY | Facility: CLINIC | Age: 57
End: 2022-01-07
Payer: COMMERCIAL

## 2022-01-07 VITALS — BODY MASS INDEX: 27.34 KG/M2 | HEIGHT: 69 IN | TEMPERATURE: 98.4 F | WEIGHT: 184.6 LBS

## 2022-01-07 DIAGNOSIS — Z98.890 STATUS POST RIGHT INGUINAL HERNIA REPAIR: Primary | ICD-10-CM

## 2022-01-07 DIAGNOSIS — Z87.19 STATUS POST RIGHT INGUINAL HERNIA REPAIR: Primary | ICD-10-CM

## 2022-01-07 PROCEDURE — 99213 OFFICE O/P EST LOW 20 MIN: CPT | Performed by: SURGERY

## 2022-01-07 NOTE — PROGRESS NOTES
Office Visit - General Surgery  Michelle Telles MRN: [id number]  Encounter: [id number]    Assessment and Plan  Problem List Items Addressed This Visit        Other    Status post right inguinal hernia repair - Primary     52yo M s/p right inguinal hernia repair with mesh with Dr Elder Reyes in 5/2021, with persistent right groin pain  Plan: Will stay on gabapentin for now  I have referred him to pain management for possible nerve injection  To see me following injection to assess his neuralgia at that point  Relevant Orders    Ambulatory referral to Pain Management          Chief Complaint:  Michelle Telles is a 55 y o  male who presents for Follow-up (f/u pain at surgery site )    Subjective  60-year-old male known to me status post right inguinal hernia repair with mesh by Dr Elder Reyes in May 2021 returns to clinic today complaining of persistent right groin pain  Following our last visit, it took him a long period of time prior to him being able to obtain more gabapentin  Since that time he was able to obtain it and his pain is somewhat improved in his right groin  He unfortunately had an incident at work which required him to walk for several hours, which exacerbated his pain  He still has trouble lifting heavy weights, or being on his feet for an extended period time  Past Medical History:   Diagnosis Date    Hypertension        Past Surgical History:   Procedure Laterality Date    IN REPAIR ING HERNIA,5+Y/O,REDUCIBL Right 5/18/2021    Procedure: REPAIR HERNIA INGUINAL;  Surgeon: Silva Watkins DO;  Location: [facility];  Service: General       Family History   Problem Relation Age of Onset    No Known Problems Mother     No Known Problems Father        Social History     Tobacco Use    Smoking status: Current Some Day Smoker    Smokeless tobacco: Never Used   Substance Use Topics    Alcohol use:  Yes    Drug use: Never        Medications  Current Outpatient Medications on File Prior to Visit   Medication Sig Dispense Refill    amLODIPine (NORVASC) 10 mg tablet Take 10 mg by mouth daily      gabapentin (Neurontin) 300 mg capsule Take 1 capsule (300 mg total) by mouth 3 (three) times a day 42 capsule 0    gabapentin (NEURONTIN) 300 mg capsule Take 1 capsule (300 mg total) by mouth 3 (three) times a day (Patient not taking: Reported on 1/7/2022 ) 21 capsule 0    methocarbamol (ROBAXIN) 500 mg tablet Take 500 mg by mouth 4 (four) times a day (Patient not taking: Reported on 1/7/2022 )       No current facility-administered medications on file prior to visit  Allergies  Allergies   Allergen Reactions    Pollen Extract Allergic Rhinitis       Review of Systems   Constitutional: Negative for appetite change, chills, diaphoresis and fever  HENT: Negative for nosebleeds and trouble swallowing  Eyes: Negative  Respiratory: Negative for cough, shortness of breath and wheezing  Cardiovascular: Negative for chest pain, palpitations and leg swelling  Gastrointestinal: Positive for abdominal pain  Negative for abdominal distention, nausea and vomiting  Genitourinary: Negative for difficulty urinating, flank pain and frequency  Musculoskeletal: Negative for arthralgias, joint swelling and myalgias  Skin: Negative for pallor and rash  Neurological: Negative for dizziness, facial asymmetry and speech difficulty  Hematological: Does not bruise/bleed easily  Psychiatric/Behavioral: Negative for agitation and confusion  All other systems reviewed and are negative  Objective  Vitals:    01/07/22 0955   Temp: 98 4 °F (36 9 °C)       Physical Exam  Vitals and nursing note reviewed  Constitutional:       General: He is not in acute distress  Appearance: Normal appearance  He is not toxic-appearing  HENT:      Head: Normocephalic and atraumatic  Mouth/Throat:      Mouth: Mucous membranes are moist    Eyes:      Extraocular Movements: Extraocular movements intact  Pupils: Pupils are equal, round, and reactive to light  Cardiovascular:      Rate and Rhythm: Normal rate and regular rhythm  Pulses: Normal pulses  Pulmonary:      Effort: Pulmonary effort is normal  No respiratory distress  Breath sounds: Normal breath sounds  No wheezing  Abdominal:      General: There is no distension  Palpations: Abdomen is soft  There is no mass  Tenderness: There is no abdominal tenderness  There is no guarding or rebound  Hernia: No hernia is present  Comments: Well-healed right groin incision, and tenderness adjacent to his right pubic tubercle  Musculoskeletal:         General: No swelling or deformity  Normal range of motion  Cervical back: Normal range of motion and neck supple  Right lower leg: No edema  Left lower leg: No edema  Skin:     General: Skin is warm and dry  Coloration: Skin is not jaundiced  Neurological:      General: No focal deficit present  Mental Status: He is alert and oriented to person, place, and time     Psychiatric:         Mood and Affect: Mood normal          Behavior: Behavior normal

## 2022-01-07 NOTE — ASSESSMENT & PLAN NOTE
50yo M s/p right inguinal hernia repair with mesh with Dr Guerita Le in 5/2021, with persistent right groin pain  Plan: Will stay on gabapentin for now  I have referred him to pain management for possible nerve injection  To see me following injection to assess his neuralgia at that point

## 2022-01-13 DIAGNOSIS — Z11.59 SCREENING FOR VIRAL DISEASE: Primary | ICD-10-CM

## 2022-01-13 PROCEDURE — U0003 INFECTIOUS AGENT DETECTION BY NUCLEIC ACID (DNA OR RNA); SEVERE ACUTE RESPIRATORY SYNDROME CORONAVIRUS 2 (SARS-COV-2) (CORONAVIRUS DISEASE [COVID-19]), AMPLIFIED PROBE TECHNIQUE, MAKING USE OF HIGH THROUGHPUT TECHNOLOGIES AS DESCRIBED BY CMS-2020-01-R: HCPCS | Performed by: OBSTETRICS & GYNECOLOGY

## 2022-01-13 PROCEDURE — U0005 INFEC AGEN DETEC AMPLI PROBE: HCPCS | Performed by: OBSTETRICS & GYNECOLOGY

## 2022-02-14 ENCOUNTER — CONSULT (OUTPATIENT)
Dept: PAIN MEDICINE | Facility: CLINIC | Age: 57
End: 2022-02-14
Payer: COMMERCIAL

## 2022-02-14 VITALS — WEIGHT: 181 LBS | BODY MASS INDEX: 26.81 KG/M2 | HEIGHT: 69 IN

## 2022-02-14 DIAGNOSIS — Z87.19 STATUS POST RIGHT INGUINAL HERNIA REPAIR: Primary | ICD-10-CM

## 2022-02-14 DIAGNOSIS — Z98.890 STATUS POST RIGHT INGUINAL HERNIA REPAIR: Primary | ICD-10-CM

## 2022-02-14 DIAGNOSIS — G57.91 ILIOINGUINAL NEURALGIA OF RIGHT SIDE: ICD-10-CM

## 2022-02-14 PROCEDURE — 99244 OFF/OP CNSLTJ NEW/EST MOD 40: CPT | Performed by: ANESTHESIOLOGY

## 2022-02-14 RX ORDER — ERGOCALCIFEROL (VITAMIN D2) 1250 MCG
1 CAPSULE ORAL WEEKLY
COMMUNITY
Start: 2022-01-27 | End: 2022-04-21

## 2022-02-14 RX ORDER — DULOXETIN HYDROCHLORIDE 30 MG/1
30 CAPSULE, DELAYED RELEASE ORAL DAILY
COMMUNITY
Start: 2022-02-09 | End: 2022-03-29

## 2022-02-14 RX ORDER — NICOTINE 21 MG/24HR
1 PATCH, TRANSDERMAL 24 HOURS TRANSDERMAL EVERY 24 HOURS
COMMUNITY
Start: 2022-02-02 | End: 2022-02-16

## 2022-02-14 RX ORDER — CHOLECALCIFEROL (VITAMIN D3) 25 MCG
CAPSULE ORAL
COMMUNITY
Start: 2022-01-27

## 2022-02-14 RX ORDER — ASPIRIN 81 MG/1
81 TABLET ORAL DAILY
COMMUNITY

## 2022-02-14 NOTE — PATIENT INSTRUCTIONS
Peripheral Nerve Block   WHAT YOU NEED TO KNOW:   What do I need to know about a peripheral nerve block? A peripheral nerve block is a type of regional anesthesia  Medicine is given as an injection to numb part of your body  The arm and leg are the most common areas for a peripheral nerve block  Other areas include the head, neck, back, abdomen, and hip  You may need a peripheral nerve block during surgery or a procedure  You may have less pain after surgery, and be able to go home sooner  Peripheral nerve blocks can also be used to treat severe pain  The pain relief usually lasts 1 to 2 weeks  How do I prepare for a peripheral nerve block? Tell your healthcare provider if you or anyone in your family has ever had problems with anesthesia  You may need to arrange to have someone drive you home after your nerve block  Your healthcare provider will talk to you about how to prepare for your nerve block  He may tell you not to eat or drink anything after midnight on the day of your nerve block  Tell him about all the medicines you take, including vitamins and herbs  He will tell you which medicines to take or not take on the day of your nerve block  What will happen during a peripheral nerve block? · You may receive medicine in your IV to make you sleepy and more relaxed  Your healthcare provider may use an ultrasound or nerve stimulator to find the nerves to numb  An ultrasound uses sound waves to show pictures of the body area on a monitor  A nerve stimulator uses a small electrical current that causes your muscle to twitch when the nerve is found  · Once the nerves are found, a needle is put through your skin into the tissue near the nerve  Anesthesia medicine is given through the needle into tissues around the nerve to be numbed  Medicine to reduce bleeding may also be given  There may be some discomfort when the numbing medicine is given  Your body part may feel tingly before it becomes numb      What will happen after a peripheral nerve block? You will be monitored until the numbness goes away  It may take a while before you can move the area that was numbed  If you are staying in the hospital, you may be taken to your room  If you will go home, you may be allowed to leave once you have feeling in the numbed area  You will be monitored until the numbness goes away  You may not be able to feel pain in the peripheral nerve block area for about 4 to 18 hours  Until you have full feeling back, you are at risk for falls and injury  Be careful not to bump the numbed body part  What are the risks of a peripheral nerve block? You may have bruising, bleeding, pain, or get an infection in the numbed area  You may have a hoarse voice, or blurry vision and a droopy eye  This is usually temporary  You may have an allergic reaction to the anesthesia  If the medicine enters a vein or you get too much, you may get headaches and have muscle twitching  You could also have a seizure or a heart attack  The peripheral nerve block may cause nerve damage, chronic pain, or loss of function of the body part  A peripheral nerve block in your upper body may damage your lungs  CARE AGREEMENT:   You have the right to help plan your care  Learn about your health condition and how it may be treated  Discuss treatment options with your healthcare providers to decide what care you want to receive  You always have the right to refuse treatment  The above information is an  only  It is not intended as medical advice for individual conditions or treatments  Talk to your doctor, nurse or pharmacist before following any medical regimen to see if it is safe and effective for you  © Copyright [employer] 2021 Information is for End User's use only and may not be sold, redistributed or otherwise used for commercial purposes   All illustrations and images included in CareNotes® are the copyrighted property of [employer]  or [employer] [facility]

## 2022-02-14 NOTE — PROGRESS NOTES
Assessment  1  Status post right inguinal hernia repair    2  Ilioinguinal neuralgia of right side        Plan  52-year-old male status post right inguinal hernia repair with mesh in May 2021, referred by Dr Suraj Amaral for persistent right groin pain and numbness  The patient states that the pain did improve somewhat after his herniorrhaphy, however residual symptoms do limit his daily function  Symptoms are consistent with ilioinguinal neuralgia status post right inguinal herniorrhaphy with mesh  The patient was taking gabapentin 300 mg t i d , however this medication was discontinued secondary to worsening depression and suicidal thoughts  He is currently taking duloxetine 30 mg daily for the past few days  He has not notice much improvement in pain, however is not experiencing any side effects presently  1  I will schedule the patient for right ilioinguinal nerve block with ultrasound guidance to reduce the inflammatory component his pain  2  Patient will continue with duloxetine as prescribed by PCP   3  Patient will continue with his home exercise program  4  I will follow up the patient in 6 weeks or sooner if needed        Complete risks and benefits including bleeding, infection, tissue reaction, nerve injury and allergic reaction were discussed  The approach was demonstrated using models and literature was provided  Verbal and written consent was obtained  My impressions and treatment recommendations were discussed in detail with the patient who verbalized understanding and had no further questions  Discharge instructions were provided  I personally saw and examined the patient and I agree with the above discussed plan of care  No orders of the defined types were placed in this encounter      New Medications Ordered This Visit   Medications    aspirin (ECOTRIN LOW STRENGTH) 81 mg EC tablet     Sig: Take 81 mg by mouth daily    CVS D3 25 MCG (1000 UT) capsule     Sig: TAKE 2 TABLETS (2,000 UNITS TOTAL) BY MOUTH DAILY   DULoxetine (CYMBALTA) 30 mg delayed release capsule     Sig: Take 30 mg by mouth daily    ergocalciferol (ERGOCALCIFEROL) 1 25 MG (87643 UT) capsule     Sig: Take 1 capsule by mouth once a week    nicotine (NICODERM CQ) 14 mg/24hr TD 24 hr patch     Sig: Place 1 patch on the skin every 24 hours       History of Present Illness    Ayo Malcolm is a 55 y o  male  status post right inguinal hernia repair with mesh in May 2021, referred by Dr Chaitanya Briscoe for persistent right groin pain and numbness and paresthesias  The patient states that the pain did improve somewhat after his herniorrhaphy, however residual symptoms do limit his daily function  He denies any dysuria, hematuria, erectile dysfunction, lower extremity weakness, fevers, chills, redness or swelling to the area  The patient was taking gabapentin 300 mg t i d , however this medication was discontinued secondary to worsening depression and suicidal thoughts  He is currently taking duloxetine 30 mg daily for the past few days  He has not notice much improvement in pain, however is not experiencing any side effects presently  The patient rates his pain a 6/10 on the pain is nearly constant  The pain is worse in the evening and at night  The pain is described as cramping, numbness, sharp, and pins and needles  The pain is increased with standing, bending, walking, and lifting  He finds some relief with relaxation and sitting  Other than as stated above, the patient denies any interval changes in medications, medical condition, mental condition, symptoms, or allergies since the last office visit  I have personally reviewed and/or updated the patient's past medical history, past surgical history, family history, social history, current medications, allergies, and vital signs today  Review of Systems   Constitutional: Negative for fever and unexpected weight change     HENT: Negative for trouble swallowing  Eyes: Negative for visual disturbance  Respiratory: Negative for shortness of breath and wheezing  Cardiovascular: Negative for chest pain and palpitations  Gastrointestinal: Negative for constipation, diarrhea, nausea and vomiting  Endocrine: Negative for cold intolerance, heat intolerance and polydipsia  Genitourinary: Negative for difficulty urinating and frequency  Musculoskeletal: Negative for arthralgias, gait problem, joint swelling and myalgias  Skin: Negative for rash  Neurological: Negative for dizziness, seizures, syncope, weakness and headaches  Hematological: Does not bruise/bleed easily  Psychiatric/Behavioral: Negative for dysphoric mood  All other systems reviewed and are negative  Patient Active Problem List   Diagnosis    Status post right inguinal hernia repair       Past Medical History:   Diagnosis Date    Hypertension        Past Surgical History:   Procedure Laterality Date    [place] REPAIR ING HERNIA,5+Y/O,REDUCIBL Right 5/18/2021    Procedure: REPAIR HERNIA INGUINAL;  Surgeon: Tomas Samaniego DO;  Location: [facility];  Service: General       Family History   Problem Relation Age of Onset    No Known Problems Mother     No Known Problems Father        Social History     Occupational History    Not on file   Tobacco Use    Smoking status: Current Some Day Smoker    Smokeless tobacco: Never Used   Substance and Sexual Activity    Alcohol use: Yes    Drug use: Never    Sexual activity: Not on file       Current Outpatient Medications on File Prior to Visit   Medication Sig    amLODIPine (NORVASC) 10 mg tablet Take 10 mg by mouth daily    aspirin (ECOTRIN LOW STRENGTH) 81 mg EC tablet Take 81 mg by mouth daily    CVS D3 25 MCG (1000 UT) capsule TAKE 2 TABLETS (2,000 UNITS TOTAL) BY MOUTH DAILY      DULoxetine (CYMBALTA) 30 mg delayed release capsule Take 30 mg by mouth daily    ergocalciferol (ERGOCALCIFEROL) 1 25 MG (70635 UT) capsule Take 1 capsule by mouth once a week    nicotine (NICODERM CQ) 14 mg/24hr TD 24 hr patch Place 1 patch on the skin every 24 hours    gabapentin (NEURONTIN) 300 mg capsule Take 1 capsule (300 mg total) by mouth 3 (three) times a day (Patient not taking: Reported on 1/7/2022 )    gabapentin (Neurontin) 300 mg capsule Take 1 capsule (300 mg total) by mouth 3 (three) times a day (Patient not taking: Reported on 2/14/2022 )    methocarbamol (ROBAXIN) 500 mg tablet Take 500 mg by mouth 4 (four) times a day (Patient not taking: Reported on 1/7/2022 )     No current facility-administered medications on file prior to visit  Allergies   Allergen Reactions    Gabapentin Other (See Comments)     Suicidal Ideations    Pollen Extract Allergic Rhinitis       Physical Exam    Ht 5' 9" (1 753 m)   Wt 82 1 kg (181 lb)   BMI 26 73 kg/m²     Constitutional: normal, well developed, well nourished, alert, in no distress and non-toxic and no overt pain behavior  Eyes: anicteric  HEENT: grossly intact  Neck: supple, symmetric, trachea midline and no masses   Pulmonary:even and unlabored   GI:  Tenderness to palpation over well-healed right inguinal herniorrhaphy incision  Abdomen otherwise soft, nontender, and without distention  No rebound, guarding, or rigidity noted  Cardiovascular:No edema or pitting edema present  Skin:Normal without rashes or lesions and well hydrated  Psychiatric:Mood and affect appropriate  Neurologic:Cranial Nerves II-XII grossly intact  Musculoskeletal:normal gait  Right lower extremity strength 5/5 in all muscle groups  Imaging      PACS Images     Show images for US scrotum and groin area    Study Result    Narrative & Impression   SCROTAL ULTRASOUND     INDICATION:    Z98 [id number]: Other specified postprocedural states  Z87 19: Personal history of other diseases of the digestive system    Hernia repair with groin discomfort ;  Evaluate for recurrence     COMPARISON: None     TECHNIQUE:   Ultrasound the scrotal contents was performed with a high frequency linear transducer utilizing volumetric sweep imaging as well as standard still image techniques  Imaging performed in longitudinal and transverse orientation  Color and   spectral Doppler evaluation also performed bilaterally      FINDINGS:     TESTES:   Testes are symmetric and normal in size      RIGHT testis = 4 1 x 1 4 x 3 cm   Normal contour with homogeneous smooth echotexture  No intratesticular mass lesion  Approximately 3 microliths are seen      LEFT testis = 4 1 x 1 6 x 2 6 cm  Normal contour with homogeneous smooth echotexture  No intratesticular mass lesion  Less than 10 microliths are seen      Doppler flow within both testes is present and symmetric      EPIDIDYMIDES:   Normal Size  Doppler ultrasound demonstrates normal blood flow  No epididymal lesions      HYDROCELE:  No significant fluid present      VARICOCELE:  Borderline dilatation of the pampiniform plexus measures 3 mm on the left        SCROTUM:  Scrotal thickness and appearance within normal limits  No evidence for extratesticular mass or hernia demonstrated      The right groin was scanned with a linear probe without evidence of mass or hernia even with Valsalva      IMPRESSION:     No evidence of testicular mass      No ultrasound abnormality in the region of concern in the right groin      Minimal testicular microlithiasis is present without intratesticular mass or other worrisome findings  In the absence of any other risk factors for testicular cancer (e g , personal history of testicular cancer, father or brother with testicular cancer,   history of cryptorchidism for maldescent, testicular atrophy, or other risk factors), no further imaging or biochemical follow-up is necessary; all that is recommended is routine monthly testicular self-examination    However if the patient has risk   factors for testicular cancer, evaluation and determination of an optimal follow-up strategy is deferred to urologist  (Reference: AJR 2016: [id number] )     Workstation performed: [id number]

## 2022-02-24 ENCOUNTER — PROCEDURE VISIT (OUTPATIENT)
Dept: PAIN MEDICINE | Facility: CLINIC | Age: 57
End: 2022-02-24
Payer: COMMERCIAL

## 2022-02-24 VITALS
SYSTOLIC BLOOD PRESSURE: 152 MMHG | DIASTOLIC BLOOD PRESSURE: 86 MMHG | BODY MASS INDEX: 26.48 KG/M2 | WEIGHT: 178.8 LBS | HEIGHT: 69 IN | HEART RATE: 81 BPM

## 2022-02-24 DIAGNOSIS — G57.91 ILIOINGUINAL NEURALGIA OF RIGHT SIDE: Primary | ICD-10-CM

## 2022-02-24 PROCEDURE — 64425 NJX AA&/STRD II IH NERVES: CPT | Performed by: ANESTHESIOLOGY

## 2022-02-24 PROCEDURE — 76942 ECHO GUIDE FOR BIOPSY: CPT | Performed by: ANESTHESIOLOGY

## 2022-02-24 RX ORDER — TRIAMCINOLONE ACETONIDE 40 MG/ML
40 INJECTION, SUSPENSION INTRA-ARTICULAR; INTRAMUSCULAR ONCE
Status: COMPLETED | OUTPATIENT
Start: 2022-02-24 | End: 2022-02-24

## 2022-02-24 RX ORDER — BUPIVACAINE HYDROCHLORIDE 2.5 MG/ML
10 INJECTION, SOLUTION EPIDURAL; INFILTRATION; INTRACAUDAL ONCE
Status: COMPLETED | OUTPATIENT
Start: 2022-02-24 | End: 2022-02-24

## 2022-02-24 RX ADMIN — BUPIVACAINE HYDROCHLORIDE 10 ML: 2.5 INJECTION, SOLUTION EPIDURAL; INFILTRATION; INTRACAUDAL at 10:35

## 2022-02-24 RX ADMIN — TRIAMCINOLONE ACETONIDE 40 MG: 40 INJECTION, SUSPENSION INTRA-ARTICULAR; INTRAMUSCULAR at 10:37

## 2022-02-24 NOTE — PROGRESS NOTES
Nerve block    Date/Time: 2/24/2022 12:24 PM  Performed by: Scot Zelaya DO  Authorized by: Scot Zelaya DO     Patient location:  [facility] Protocol:  Consent: Verbal consent obtained  Written consent obtained  Risks and benefits: risks, benefits and alternatives were discussed  Consent given by: patient  Time out: Immediately prior to procedure a "time out" was called to verify the correct patient, procedure, equipment, support staff and site/side marked as required  Timeout called at: 2/24/2022 10:33 AM   Patient understanding: patient states understanding of the procedure being performed  Patient consent: the patient's understanding of the procedure matches consent given  Procedure consent: procedure consent matches procedure scheduled  Site marked: the operative site was marked  Radiology Images displayed and confirmed  If images not available, report reviewed: imaging studies available  Required items: required blood products, implants, devices, and special equipment available  Patient identity confirmed: verbally with patient      Indications:     Indications:  Pain relief  Location:     Body area:  Trunk    Trunk nerve: ilioinguinal      Nerve type:  Peripheral    Laterality:  Right  Pre-procedure details:     Skin preparation:  2% chlorhexidine    Preparation: Patient was prepped and draped in usual sterile fashion    Procedure details (see MAR for exact dosages): Block needle gauge:  25 G    Guidance: ultrasound          Indication:  Right groin pain  Preoperative diganosis:  Right ilioinguinal neuralgia  Postoperative diagnosis:  Right ilioinguinal neuralgia  Procedure: Ultrasound guided right ilioinguinal nerve block    After discussing the risks, benefits, and alternatives to the procedure, the patient expressed understanding and wished to proceed  The patient was brought to the procedure suite and placed in the supine position   A procedural pause was conducted to verify: Correct patient identity, procedure to be performed and as applicable, correct side and site, correct patient position, and availability of implants, special equipment or special requirements  A simple surgical tray was used  Prior to the procedure, the right abdominal wall was examined with a 12 MHz linear transducer to visualize external oblique, internal oblique, and transversus abdominis muscles just medial to the anterior superior iliac spine  Following this, the abdominal wall was prepared with a ChloraPrep scrub, then reexamined using the same transducer, a sterile ultrasound transducer cover, and sterile ultrasound transducer gel  Thereafter, using ultrasound guidance, a 2 5 inch 25-gauge needle was advanced into the into the fascial plane between the internal oblique and transversus abdominis  After visualization of the tip and negative aspiration for blood, a mixture of 40 mg of Depo-Medrol in 4 mL of 0 25% bupivacaine was injected into the plane between the internal oblique and transversus abdominis  The patient tolerated the procedure well and there were no apparent complications  After an appropriate amount of observation, the patient was dismissed from the recovery area under their own power  The patient received a total steroid dose of 40 mg of Depo-Medrol

## 2022-02-24 NOTE — LETTER
February 24, 2022     Patient: Apurva Bobby   YOB: 1965   Date of Visit: 2/24/2022       To Whom it May Concern:    Apurva Bobby is under my professional care  He was seen in my office on 2/24/2022  If you have any questions or concerns, please don't hesitate to call           Sincerely,          Chloé Venegas DO        CC: No Recipients

## 2022-03-03 ENCOUNTER — TELEPHONE (OUTPATIENT)
Dept: PAIN MEDICINE | Facility: CLINIC | Age: 57
End: 2022-03-03

## 2022-03-28 ENCOUNTER — OFFICE VISIT (OUTPATIENT)
Dept: PAIN MEDICINE | Facility: CLINIC | Age: 57
End: 2022-03-28
Payer: COMMERCIAL

## 2022-03-28 VITALS
HEIGHT: 69 IN | BODY MASS INDEX: 26.36 KG/M2 | SYSTOLIC BLOOD PRESSURE: 171 MMHG | WEIGHT: 178 LBS | HEART RATE: 80 BPM | DIASTOLIC BLOOD PRESSURE: 94 MMHG

## 2022-03-28 DIAGNOSIS — Z87.19 STATUS POST RIGHT INGUINAL HERNIA REPAIR: ICD-10-CM

## 2022-03-28 DIAGNOSIS — M79.18 MYOFASCIAL PAIN SYNDROME: ICD-10-CM

## 2022-03-28 DIAGNOSIS — G57.91 ILIOINGUINAL NEURALGIA OF RIGHT SIDE: Primary | ICD-10-CM

## 2022-03-28 DIAGNOSIS — Z98.890 STATUS POST RIGHT INGUINAL HERNIA REPAIR: ICD-10-CM

## 2022-03-28 PROCEDURE — 99214 OFFICE O/P EST MOD 30 MIN: CPT | Performed by: NURSE PRACTITIONER

## 2022-03-28 RX ORDER — TIZANIDINE 2 MG/1
2 TABLET ORAL EVERY 8 HOURS PRN
Qty: 90 TABLET | Refills: 1 | Status: SHIPPED | OUTPATIENT
Start: 2022-03-28 | End: 2022-04-25

## 2022-03-28 NOTE — PATIENT INSTRUCTIONS
Tizanidine (By mouth)   Tizanidine ([id number])  Treats muscle spasticity  Brand Name(s): Zanaflex, Zanaflex Capsule   There may be other brand names for this medicine  When This Medicine Should Not Be Used: This medicine is not right for everyone  Do not use if you had an allergic reaction to tizanidine  How to Use This Medicine:   Capsule, Tablet  · Take your medicine as directed  Your dose may need to be changed several times to find what works best for you  · You may take this medicine with or without food, but always take it the same way every time  Tizanidine works differently depending on whether you take it on an empty stomach or a full stomach  Talk to your doctor if you have any questions about this  · Missed dose: Take a dose as soon as you remember  If it is almost time for your next dose, wait until then and take a regular dose  Do not take extra medicine to make up for a missed dose  · Store the medicine in a closed container at room temperature, away from heat, moisture, and direct light  Drugs and Foods to Avoid:   Ask your doctor or pharmacist before using any other medicine, including over-the-counter medicines, vitamins, and herbal products  · Do not use this medicine together with ciprofloxacin or fluvoxamine  · Some foods and medicines can affect how tizanidine works  Tell your doctor if you are using any of the following:  ? Acyclovir, baclofen, cimetidine, famotidine, ticlopidine, verapamil, zileuton  ? Birth control pills, blood pressure medicine, medicine for heart rhythm problems (such as amiodarone, mexiletine, propafenone), or medicine to treat an infection (such as levofloxacin, moxifloxacin)  · Do not drink alcohol while you are using this medicine  · Tell your doctor if you use anything else that makes you sleepy  Some examples are allergy medicine, narcotic pain medicine, and alcohol    Warnings While Using This Medicine:   · Tell your doctor if you are pregnant or breastfeeding, or if you have kidney disease or liver disease  · This medicine may cause the following problems:  ? Low blood pressure  ? Liver damage  · This medicine may make you dizzy or drowsy  Do not drive or do anything else that could be dangerous until you know how this medicine affects you  Stand or sit up slowly if you are dizzy  · Do not stop using this medicine suddenly  Your doctor will need to slowly decrease your dose before you stop it completely  · Your doctor will do lab tests at regular visits to check on the effects of this medicine  Keep all appointments  · Keep all medicine out of the reach of children  Never share your medicine with anyone  Possible Side Effects While Using This Medicine:   Call your doctor right away if you notice any of these side effects:  · Allergic reaction: Itching or hives, swelling in your face or hands, swelling or tingling in your mouth or throat, chest tightness, trouble breathing  · Dark urine or pale stools, nausea, vomiting, loss of appetite, stomach pain, yellow skin or eyes  · Lightheadedness, dizziness, or fainting  · Seeing or hearing things that are not really there  · Slow heartbeat  If you notice these less serious side effects, talk with your doctor:   · Dry mouth  · Drowsiness or sleepiness  · Weakness  If you notice other side effects that you think are caused by this medicine, tell your doctor  Call your doctor for medical advice about side effects  You may report side effects to FDA at 8-084-FDA-4085    © Copyright [employer] 2022 Information is for End User's use only and may not be sold, redistributed or otherwise used for commercial purposes  The above information is an  only  It is not intended as medical advice for individual conditions or treatments  Talk to your doctor, nurse or pharmacist before following any medical regimen to see if it is safe and effective for you

## 2022-03-28 NOTE — LETTER
March 28, 2022     Patient: Ana Amin   YOB: 1965   Date of Visit: 3/28/2022       To Whom it May Concern:    Ana Amin is under my professional care  He was seen in my office on 3/28/2022  He may return to work on 03/28/2022  If you have any questions or concerns, please don't hesitate to call           Sincerely,          SOUMYA Hernandez        CC: No Recipients

## 2022-03-28 NOTE — PROGRESS NOTES
Assessment:  1  Ilioinguinal neuralgia of right side    2  Status post right inguinal hernia repair    3  Myofascial pain syndrome        Plan:  1  We can repeat right ilioinguinal nerve block to see if a 2nd round of injections would give him longer lasting relief  Patient would like to consider this option  2  I will trial tizanidine 2 mg q 8 hours p r n  myofascial pain  I advised the patient that they should not drive or operate machinery while on this medication until they see how it affects them, as it could cause lethargy and mental cloudyness  I advised the patient to call our office if they experience any side effects or issues with the medication changes  The patient verbalized an understanding  3  Consider a trial of amitriptyline in the future   4  Patient will continue to follow with General surgery as scheduled   5  A list of medical marijuana providers was given to the patient today    6  Patient will follow-up in 4 weeks or sooner if needed    M*Modal software was used to dictate this note  It may contain errors with dictating incorrect words or incorrect spelling  Please contact the provider directly with any questions  History of Present Illness: The patient is a 55 y o  male status post right inguinal hernia repair with mesh in May 2021 last seen on 2/14/22 who presents for a follow up office visit in regards to chronic persistent right groin pain, numbness and paresthesias secondary to inguinal neuralgia  The patient is status post right ilioinguinal nerve block on February 24, 2022 and reported 80% relief for about 2 weeks  The pain has returned to baseline  He denies any radiating symptoms further into the right lower extremity, bowel or bladder incontinence, saddle anesthesia or low back pain  He was trialed on gabapentin however this worsened his depression therefore the medication was discontinued    He was also taking duloxetine however this was discontinued secondary to hypertension  He also complains of cramping in his legs    The patient rates his pain a 10/10 on the numeric pain rating scale  He intermittently has pain throughout the day which is described as sharp, cramping, numbness and pins and needles    I have personally reviewed and/or updated the patient's past medical history, past surgical history, family history, social history, current medications, allergies, and vital signs today  Review of Systems:    Review of Systems   Respiratory: Negative for shortness of breath  Cardiovascular: Negative for chest pain  Gastrointestinal: Negative for constipation, diarrhea, nausea and vomiting  Musculoskeletal: Positive for gait problem  Negative for arthralgias, joint swelling and myalgias  Skin: Negative for rash  Neurological: Negative for dizziness, seizures and weakness  All other systems reviewed and are negative  Past Medical History:   Diagnosis Date    Hypertension        Past Surgical History:   Procedure Laterality Date    [place] REPAIR ING HERNIA,5+Y/O,REDUCIBL Right 5/18/2021    Procedure: REPAIR HERNIA INGUINAL;  Surgeon: Christophe Austin DO;  Location: [facility];  Service: General       Family History   Problem Relation Age of Onset    No Known Problems Mother     No Known Problems Father        Social History     Occupational History    Not on file   Tobacco Use    Smoking status: Current Some Day Smoker    Smokeless tobacco: Never Used   Substance and Sexual Activity    Alcohol use: Yes    Drug use: Never    Sexual activity: Not on file         Current Outpatient Medications:     amLODIPine (NORVASC) 10 mg tablet, Take 10 mg by mouth daily, Disp: , Rfl:     aspirin (ECOTRIN LOW STRENGTH) 81 mg EC tablet, Take 81 mg by mouth daily, Disp: , Rfl:     CVS D3 25 MCG (1000 UT) capsule, TAKE 2 TABLETS (2,000 UNITS TOTAL) BY MOUTH DAILY  , Disp: , Rfl:     DULoxetine (CYMBALTA) 30 mg delayed release capsule, Take 30 mg by mouth daily, Disp: , Rfl:     ergocalciferol (ERGOCALCIFEROL) 1 25 MG (42849 UT) capsule, Take 1 capsule by mouth once a week, Disp: , Rfl:     gabapentin (NEURONTIN) 300 mg capsule, Take 1 capsule (300 mg total) by mouth 3 (three) times a day (Patient not taking: Reported on 1/7/2022 ), Disp: 21 capsule, Rfl: 0    gabapentin (Neurontin) 300 mg capsule, Take 1 capsule (300 mg total) by mouth 3 (three) times a day (Patient not taking: Reported on 2/14/2022 ), Disp: 42 capsule, Rfl: 0    methocarbamol (ROBAXIN) 500 mg tablet, Take 500 mg by mouth 4 (four) times a day (Patient not taking: Reported on 1/7/2022 ), Disp: , Rfl:     tiZANidine (ZANAFLEX) 2 mg tablet, Take 1 tablet (2 mg total) by mouth every 8 (eight) hours as needed for muscle spasms, Disp: 90 tablet, Rfl: 1    Allergies   Allergen Reactions    Gabapentin Other (See Comments)     Suicidal Ideations    Pollen Extract Allergic Rhinitis       Physical Exam:    BP (!) 171/94   Pulse 80   Ht 5' 9" (1 753 m)   Wt 80 7 kg (178 lb)   BMI 26 29 kg/m²     Constitutional:normal, well developed, well nourished, alert, in no distress and non-toxic and no overt pain behavior  Eyes:anicteric  HEENT:grossly intact  Neck:supple, symmetric, trachea midline and no masses   Pulmonary:even and unlabored  Cardiovascular:No edema or pitting edema present  Skin:Normal without rashes or lesions and well hydrated  Psychiatric:Mood and affect appropriate  Neurologic:Cranial Nerves II-XII grossly intact  Musculoskeletal:antalgic gait      Imaging  No orders to display         No orders of the defined types were placed in this encounter

## 2022-04-25 ENCOUNTER — OFFICE VISIT (OUTPATIENT)
Dept: PAIN MEDICINE | Facility: CLINIC | Age: 57
End: 2022-04-25
Payer: COMMERCIAL

## 2022-04-25 VITALS
SYSTOLIC BLOOD PRESSURE: 154 MMHG | BODY MASS INDEX: 26.36 KG/M2 | HEIGHT: 69 IN | DIASTOLIC BLOOD PRESSURE: 100 MMHG | WEIGHT: 178 LBS | HEART RATE: 88 BPM

## 2022-04-25 DIAGNOSIS — M79.2 NEUROPATHIC PAIN: ICD-10-CM

## 2022-04-25 DIAGNOSIS — R10.31 RIGHT GROIN PAIN: ICD-10-CM

## 2022-04-25 DIAGNOSIS — Z98.890 STATUS POST RIGHT INGUINAL HERNIA REPAIR: ICD-10-CM

## 2022-04-25 DIAGNOSIS — G57.91 ILIOINGUINAL NEURALGIA OF RIGHT SIDE: Primary | ICD-10-CM

## 2022-04-25 DIAGNOSIS — Z87.19 STATUS POST RIGHT INGUINAL HERNIA REPAIR: ICD-10-CM

## 2022-04-25 PROCEDURE — 99214 OFFICE O/P EST MOD 30 MIN: CPT | Performed by: NURSE PRACTITIONER

## 2022-04-25 RX ORDER — AMITRIPTYLINE HYDROCHLORIDE 25 MG/1
25 TABLET, FILM COATED ORAL
Qty: 30 TABLET | Refills: 1 | Status: SHIPPED | OUTPATIENT
Start: 2022-04-25

## 2022-04-25 NOTE — PROGRESS NOTES
Assessment:  1  Ilioinguinal neuralgia of right side    2  Status post right inguinal hernia repair    3  Right groin pain    4  Neuropathic pain        Plan:  1  I will order an x-ray of the right hip  2  I will order an EMG of the right lower extremity  3  I will increase amitriptyline to 25 milligrams q h s  for neuropathic complaints  I advised the patient that if they experience any side effects or issues with the changes in their medication regiment, they should give our office a call to discuss  I also advised the patient not to drive or operate machinery until they see how the changes in the medication regimen affects them  The patient was agreeable and verbalized an understanding  4   Continue tizanidine secondary to ineffectiveness   5  Patient is still considering medical marijuana  6  Patient not interested in repeat ilioinguinal nerve block  7  Patient will follow-up in 4 weeks or sooner if needed      M*Modal software was used to dictate this note  It may contain errors with dictating incorrect words or incorrect spelling  Please contact the provider directly with any questions  History of Present Illness: The patient is a 65 y o  male status post right ilioinguinal hernia repair with mesh in May 2021 last seen on 3/28/22 who presents for a follow up office visit in regards to persistent chronic right groin pain  Patient denies any significant low back pain, bowel or bladder incontinence or saddle anesthesia  Patient is status post right Andrew inguinal nerve block on February 24, 2022 and reported 80 percent relief for about 2 weeks  The pain has since returned to baseline  He has not been interested in repeat blocks  He has tried gabapentin and duloxetine however both caused side effects  He has not found relief with NSAIDs or muscle relaxants  He is taking amitriptyline 10 milligrams q h s  without relief  He states that his pain is the same as even prior to his hernia repair      The patient rates his pain a 5/10 on the numeric pain rating scale  He intermittently has pain throughout the day which is described as sharp, cramping, numbness and pins and needles    I have personally reviewed and/or updated the patient's past medical history, past surgical history, family history, social history, current medications, allergies, and vital signs today  Review of Systems:    Review of Systems   Respiratory: Negative for shortness of breath  Cardiovascular: Negative for chest pain  Gastrointestinal: Negative for constipation, diarrhea, nausea and vomiting  Musculoskeletal: Positive for gait problem  Negative for arthralgias, joint swelling and myalgias  Skin: Negative for rash  Neurological: Negative for dizziness, seizures and weakness  All other systems reviewed and are negative  Past Medical History:   Diagnosis Date    Hypertension        Past Surgical History:   Procedure Laterality Date    OK REPAIR ING HERNIA,5+Y/O,REDUCIBL Right 5/18/2021    Procedure: REPAIR HERNIA INGUINAL;  Surgeon: Yenifer Gupta DO;  Location: [facility];  Service: General       Family History   Problem Relation Age of Onset    No Known Problems Mother     No Known Problems Father        Social History     Occupational History    Not on file   Tobacco Use    Smoking status: Current Some Day Smoker    Smokeless tobacco: Never Used   Substance and Sexual Activity    Alcohol use: Yes    Drug use: Never    Sexual activity: Not on file         Current Outpatient Medications:     amitriptyline (ELAVIL) 25 mg tablet, Take 1 tablet (25 mg total) by mouth daily at bedtime, Disp: 30 tablet, Rfl: 1    amLODIPine (NORVASC) 10 mg tablet, Take 10 mg by mouth daily, Disp: , Rfl:     aspirin (ECOTRIN LOW STRENGTH) 81 mg EC tablet, Take 81 mg by mouth daily, Disp: , Rfl:     CVS D3 25 MCG (1000 UT) capsule, TAKE 2 TABLETS (2,000 UNITS TOTAL) BY MOUTH DAILY  , Disp: , Rfl:     ergocalciferol (ERGOCALCIFEROL) 1 25 MG (08891 UT) capsule, Take 1 capsule by mouth once a week, Disp: , Rfl:     gabapentin (NEURONTIN) 300 mg capsule, Take 1 capsule (300 mg total) by mouth 3 (three) times a day (Patient not taking: Reported on 1/7/2022 ), Disp: 21 capsule, Rfl: 0    gabapentin (Neurontin) 300 mg capsule, Take 1 capsule (300 mg total) by mouth 3 (three) times a day (Patient not taking: Reported on 2/14/2022 ), Disp: 42 capsule, Rfl: 0    methocarbamol (ROBAXIN) 500 mg tablet, Take 500 mg by mouth 4 (four) times a day (Patient not taking: Reported on 1/7/2022 ), Disp: , Rfl:     Allergies   Allergen Reactions    Gabapentin Other (See Comments)     Suicidal Ideations    Pollen Extract Allergic Rhinitis       Physical Exam:    /100   Pulse 88   Ht 5' 9" (1 753 m)   Wt 80 7 kg (178 lb)   BMI 26 29 kg/m²     Constitutional:normal, well developed, well nourished, alert, in no distress and non-toxic and no overt pain behavior  Eyes:anicteric  HEENT:grossly intact  Neck:supple, symmetric, trachea midline and no masses   Pulmonary:even and unlabored  Cardiovascular:No edema or pitting edema present  Skin:Normal without rashes or lesions and well hydrated  Psychiatric:Mood and affect appropriate  Neurologic:Cranial Nerves II-XII grossly intact  Musculoskeletal:antalgic gait        Imaging  XR hip/pelv 2-3 vws right if performed    (Results Pending)         Orders Placed This Encounter   Procedures    XR hip/pelv 2-3 vws right if performed    EMG 1 Limb

## 2025-01-29 ENCOUNTER — HOSPITAL ENCOUNTER (EMERGENCY)
Facility: HOSPITAL | Age: 60
Discharge: HOME | End: 2025-01-29
Attending: EMERGENCY MEDICINE | Admitting: EMERGENCY MEDICINE
Payer: MEDICARE

## 2025-01-29 ENCOUNTER — APPOINTMENT (EMERGENCY)
Dept: RADIOLOGY | Facility: HOSPITAL | Age: 60
End: 2025-01-29
Payer: MEDICARE

## 2025-01-29 VITALS
HEART RATE: 81 BPM | TEMPERATURE: 98.8 F | OXYGEN SATURATION: 99 % | SYSTOLIC BLOOD PRESSURE: 149 MMHG | DIASTOLIC BLOOD PRESSURE: 84 MMHG | RESPIRATION RATE: 18 BRPM

## 2025-01-29 DIAGNOSIS — M19.90 ARTHRITIS: ICD-10-CM

## 2025-01-29 DIAGNOSIS — M79.89 HAND SWELLING: Primary | ICD-10-CM

## 2025-01-29 PROCEDURE — 99284 EMERGENCY DEPT VISIT MOD MDM: CPT | Performed by: EMERGENCY MEDICINE

## 2025-01-29 PROCEDURE — 99283 EMERGENCY DEPT VISIT LOW MDM: CPT

## 2025-01-29 PROCEDURE — 73130 X-RAY EXAM OF HAND: CPT

## 2025-01-29 RX ORDER — IBUPROFEN 400 MG/1
400 TABLET, FILM COATED ORAL EVERY 6 HOURS PRN
Qty: 15 TABLET | Refills: 0 | Status: SHIPPED | OUTPATIENT
Start: 2025-01-29 | End: 2025-02-13

## 2025-01-29 RX ORDER — IBUPROFEN 400 MG/1
400 TABLET, FILM COATED ORAL ONCE
Status: COMPLETED | OUTPATIENT
Start: 2025-01-29 | End: 2025-01-29

## 2025-01-29 RX ADMIN — IBUPROFEN 400 MG: 400 TABLET, FILM COATED ORAL at 07:48

## 2025-01-29 NOTE — ED ATTENDING ATTESTATION
1/29/2025  I, Cullen Baum MD, saw and evaluated the patient. I have discussed the patient with the resident/non-physician practitioner and agree with the resident's/non-physician practitioner's findings, Plan of Care, and MDM as documented in the resident's/non-physician practitioner's note, except where noted. All available labs and Radiology studies were reviewed.  I was present for key portions of any procedure(s) performed by the resident/non-physician practitioner and I was immediately available to provide assistance.       At this point I agree with the current assessment done in the Emergency Department.  I have conducted an independent evaluation of this patient a history and physical is as follows:    ED Course     Emergency Department Note- Roland Campbell 59 y.o. male MRN: [id number]    Unit/Bed#: Z2HA Encounter: [id number]    Roland Campbell is a 59 y.o. male who presents with   Chief Complaint   Patient presents with    Hand Swelling     R hand swelling that started two days ago, fell on ice a couple weeks ago, reports pins and needles pain in hand and arm, pain making it difficult to sleep, pt reports limited ROM in hand           History of Present Illness   HPI:  Roland Campbell is a 59 y.o. male who presents for evaluation of:  Acute right hand pain and swelling that started about 2 days ago.  The patient notes pins and needle sensation over the dorsum and palmar surface of the hand.  He notes that he fell about a month ago but did not have any discomfort until about 2 days ago.  The pain in his right hand is making sleep difficult.  He took some acetaminophen without resolution of discomfort at home.    Review of Systems   Constitutional:  Negative for fatigue and fever.   HENT:  Negative for congestion and sore throat.    Respiratory:  Negative for cough and shortness of breath.    Cardiovascular:  Negative for chest pain and palpitations.   Gastrointestinal:  Negative for abdominal pain and  nausea.   Genitourinary:  Negative for flank pain and frequency.   Neurological:  Negative for light-headedness and headaches.   Psychiatric/Behavioral:  Negative for dysphoric mood and hallucinations.    All other systems reviewed and are negative.      Historical Information   Past Medical History:   Diagnosis Date    Hypertension      Past Surgical History:   Procedure Laterality Date    NE RPR 1ST INGUN HRNA AGE 5 YRS/> REDUCIBLE Right 5/18/2021    Procedure: REPAIR HERNIA INGUINAL;  Surgeon: Darin Mendoza DO;  Location: [facility];  Service: General     Social History   Social History     Substance and Sexual Activity   Alcohol Use Yes     Social History     Substance and Sexual Activity   Drug Use Never     Social History     Tobacco Use   Smoking Status Some Days   Smokeless Tobacco Never     Family History:   Family History   Problem Relation Age of Onset    No Known Problems Mother     No Known Problems Father        Meds/Allergies   PTA meds:   Prior to Admission Medications   Prescriptions Last Dose Informant Patient Reported? Taking?   CVS D3 25 MCG (1000 UT) capsule   Yes No   Sig: TAKE 2 TABLETS (2,000 UNITS TOTAL) BY MOUTH DAILY.   amLODIPine (NORVASC) 10 mg tablet   Yes No   Sig: Take 10 mg by mouth daily   amitriptyline (ELAVIL) 25 mg tablet   No No   Sig: Take 1 tablet (25 mg total) by mouth daily at bedtime   aspirin (ECOTRIN LOW STRENGTH) 81 mg EC tablet   Yes No   Sig: Take 81 mg by mouth daily   ergocalciferol (ERGOCALCIFEROL) 1.25 MG (57326 UT) capsule   Yes No   Sig: Take 1 capsule by mouth once a week   gabapentin (NEURONTIN) 300 mg capsule   No No   Sig: Take 1 capsule (300 mg total) by mouth 3 (three) times a day   Patient not taking: Reported on 1/7/2022    gabapentin (Neurontin) 300 mg capsule   No No   Sig: Take 1 capsule (300 mg total) by mouth 3 (three) times a day   Patient not taking: Reported on 2/14/2022    methocarbamol (ROBAXIN) 500 mg tablet  Self Yes No   Sig: Take 500 mg by  mouth 4 (four) times a day   Patient not taking: Reported on 1/7/2022       Facility-Administered Medications: None     Allergies   Allergen Reactions    Gabapentin Other (See Comments)     Suicidal Ideations    Pollen Extract Allergic Rhinitis       Objective   First Vitals:   Blood Pressure: 149/84 (01/29/25 0657)  Pulse: 81 (01/29/25 0657)  Temperature: 98.8 °F (37.1 °C) (01/29/25 0657)  Temp Source: Temporal (01/29/25 0657)  Respirations: 18 (01/29/25 0657)  SpO2: 99 % (01/29/25 0657)    Current Vitals:   Blood Pressure: 149/84 (01/29/25 0657)  Pulse: 81 (01/29/25 0657)  Temperature: 98.8 °F (37.1 °C) (01/29/25 0657)  Temp Source: Temporal (01/29/25 0657)  Respirations: 18 (01/29/25 0657)  SpO2: 99 % (01/29/25 0657)    No intake or output data in the 24 hours ending 01/29/25 0746    Invasive Devices       None                   Physical Exam  Vitals and nursing note reviewed.   Constitutional:       General: He is not in acute distress.     Appearance: Normal appearance. He is well-developed.   HENT:      Head: Normocephalic and atraumatic.      Right Ear: External ear normal.      Left Ear: External ear normal.      Nose: Nose normal.      Mouth/Throat:      Pharynx: No oropharyngeal exudate.   Eyes:      Conjunctiva/sclera: Conjunctivae normal.      Pupils: Pupils are equal, round, and reactive to light.   Cardiovascular:      Rate and Rhythm: Normal rate and regular rhythm.   Pulmonary:      Effort: Pulmonary effort is normal. No respiratory distress.   Abdominal:      General: Abdomen is flat. There is no distension.      Palpations: Abdomen is soft.   Musculoskeletal:         General: No deformity. Normal range of motion.      Cervical back: Normal range of motion and neck supple.      Comments: Full range of motion of right hand.   Skin:     General: Skin is warm and dry.      Capillary Refill: Capillary refill takes less than 2 seconds.   Neurological:      General: No focal deficit present.      Mental  "Status: He is alert and oriented to person, place, and time. Mental status is at baseline.      Coordination: Coordination normal.   Psychiatric:         Mood and Affect: Mood normal.         Behavior: Behavior normal.         Thought Content: Thought content normal.         Judgment: Judgment normal.     Radial, median, and ulnar nerve function motor and sensory are normal.      Medical Decision Makin.  Acute right hand swelling and discomfort: Plan x-ray rule out fracture; NSAIDs for pain control; follow-up with PCP as an outpatient.    No results found for this or any previous visit (from the past 36 hours).  XR hand 3+ views RIGHT    (Results Pending)         Portions of the record may have been created with voice recognition software. Occasional wrong word or \"sound a like\" substitutions may have occurred due to the inherent limitations of voice recognition software.  Read the chart carefully and recognize, using context, where substitutions have occurred.        Critical Care Time  Procedures      "

## 2025-01-29 NOTE — ED PROVIDER NOTES
Time reflects when diagnosis was documented in both MDM as applicable and the Disposition within this note       Time User Action Codes Description Comment    1/29/2025  8:23 AM Cullen Baum Add [M79.89] Hand swelling     1/29/2025  8:23 AM Cullen Baum Add [M19.90] Arthritis           ED Disposition       ED Disposition   Discharge    Condition   Stable    Date/Time   Wed Jan 29, 2025  8:23 AM    Comment   Roland Campbell discharge to home/self care.                   Assessment & Plan       Medical Decision Making  Amount and/or Complexity of Data Reviewed  Radiology: ordered and independent interpretation performed.    Risk  Prescription drug management.    ASSESSMENT: Patient is a 59 y.o. male who presents with right hand pain and swelling x 2 days.   PLAN: right hand XR. Treated with ibuprofen.    Stable for discharge. Strict return to ED precautions provided. Advised to follow up with PCP within 1 week for re-evaluation and further management. Patient verbalized understanding and agrees with the plan of care.          Medications   ibuprofen (MOTRIN) tablet 400 mg (400 mg Oral Given 1/29/25 0748)       ED Risk Strat Scores                          SBIRT 22yo+      Flowsheet Row Most Recent Value   Initial Alcohol Screen: US AUDIT-C     1. How often do you have a drink containing alcohol? 1 Filed at: 01/29/2025 0658   2. How many drinks containing alcohol do you have on a typical day you are drinking?  0 Filed at: 01/29/2025 0658   3a. Male UNDER 65: How often do you have five or more drinks on one occasion? 0 Filed at: 01/29/2025 0658   Audit-C Score 1 Filed at: 01/29/2025 0658   SANDRA: How many times in the past year have you...    Used an illegal drug or used a prescription medication for non-medical reasons? Never Filed at: 01/29/2025 0658                            History of Present Illness       Chief Complaint   Patient presents with    Hand Swelling     R hand swelling that started two days ago,  "fell on ice a couple weeks ago, reports pins and needles pain in hand and arm, pain making it difficult to sleep, pt reports limited ROM in hand         Past Medical History:   Diagnosis Date    Hypertension       Past Surgical History:   Procedure Laterality Date    ID RPR 1ST INGUN HRNA AGE 5 YRS/> REDUCIBLE Right 5/18/2021    Procedure: REPAIR HERNIA INGUINAL;  Surgeon: Darin Mendoza DO;  Location: [facility];  Service: General      Family History   Problem Relation Age of Onset    No Known Problems Mother     No Known Problems Father       Social History     Tobacco Use    Smoking status: Some Days    Smokeless tobacco: Never   Substance Use Topics    Alcohol use: Yes    Drug use: Never      E-Cigarette/Vaping      E-Cigarette/Vaping Substances      I have reviewed and agree with the history as documented.     HPI  Patient is a 59 y.o. male with PMHx HTN who presents to the ED for evaluation of 2 days of right hand pain and swelling.  Patient states that he sustained a fall about a month ago with his right hand in a fist. Also endorses some \"pins and needles\" sensation in his hands. Patient is right hand dominant. Denies sustaining any other injuries at that time.  Denies history of diabetes. Denies sustaining any other injuries.     Review of Systems   Musculoskeletal:         Right hand pain and swelling            Objective       ED Triage Vitals [01/29/25 0657]   Temperature Pulse Blood Pressure Respirations SpO2 Patient Position - Orthostatic VS   98.8 °F (37.1 °C) 81 149/84 18 99 % --      Temp Source Heart Rate Source BP Location FiO2 (%) Pain Score    Temporal Monitor -- -- 9      Vitals      Date and Time Temp Pulse SpO2 Resp BP Pain Score FACES Pain Rating User   01/29/25 0748 -- -- -- -- -- 10 - Worst Possible Pain -- JK   01/29/25 0726 -- -- -- -- -- 10 - Worst Possible Pain -- JK   01/29/25 0657 98.8 °F (37.1 °C) 81 99 % 18 149/84 9 -- AS            Physical Exam  Vitals and nursing note reviewed. "   Constitutional:       General: He is not in acute distress.     Appearance: Normal appearance. He is well-developed. He is not ill-appearing, toxic-appearing or diaphoretic.   HENT:      Head: Normocephalic and atraumatic.   Eyes:      General:         Right eye: No discharge.         Left eye: No discharge.      Conjunctiva/sclera: Conjunctivae normal.   Cardiovascular:      Rate and Rhythm: Normal rate.   Pulmonary:      Effort: Pulmonary effort is normal. No respiratory distress.   Musculoskeletal:         General: No swelling or deformity.      Cervical back: Normal range of motion and neck supple.      Comments: Tenderness right hand diffusely  Skin intact  No obvious deformity  SILT  2+ radial pulses  <2 sec capillary refill      Skin:     General: Skin is warm and dry.      Capillary Refill: Capillary refill takes less than 2 seconds.   Neurological:      General: No focal deficit present.      Mental Status: He is alert and oriented to person, place, and time.   Psychiatric:         Mood and Affect: Mood normal.         Results Reviewed       None            XR hand 3+ views RIGHT   ED Interpretation by Cullen Baum MD (01/29 0823)   No acute fracture      Final Interpretation by Cristian Roberson DO (01/29 1056)      No acute osseous abnormality.         Computerized Assisted Algorithm (CAA) may have been used to analyze all applicable images.         Resident: Bladimir Nye I, the attending radiologist, have reviewed the images and agree with the final report above.      Workstation performed: [id number]             Procedures    ED Medication and Procedure Management   Prior to Admission Medications   Prescriptions Last Dose Informant Patient Reported? Taking?   CVS D3 25 MCG (1000 UT) capsule   Yes No   Sig: TAKE 2 TABLETS (2,000 UNITS TOTAL) BY MOUTH DAILY.   amLODIPine (NORVASC) 10 mg tablet   Yes No   Sig: Take 10 mg by mouth daily   amitriptyline (ELAVIL) 25 mg tablet   No No   Sig:  Take 1 tablet (25 mg total) by mouth daily at bedtime   aspirin (ECOTRIN LOW STRENGTH) 81 mg EC tablet   Yes No   Sig: Take 81 mg by mouth daily   ergocalciferol (ERGOCALCIFEROL) 1.25 MG (47568 UT) capsule   Yes No   Sig: Take 1 capsule by mouth once a week   gabapentin (NEURONTIN) 300 mg capsule   No No   Sig: Take 1 capsule (300 mg total) by mouth 3 (three) times a day   Patient not taking: Reported on 1/7/2022    gabapentin (Neurontin) 300 mg capsule   No No   Sig: Take 1 capsule (300 mg total) by mouth 3 (three) times a day   Patient not taking: Reported on 2/14/2022    methocarbamol (ROBAXIN) 500 mg tablet  Self Yes No   Sig: Take 500 mg by mouth 4 (four) times a day   Patient not taking: Reported on 1/7/2022       Facility-Administered Medications: None     Discharge Medication List as of 1/29/2025  8:26 AM        START taking these medications    Details   ibuprofen (MOTRIN) 400 mg tablet Take 1 tablet (400 mg total) by mouth every 6 (six) hours as needed for moderate pain for up to 15 days, Starting Wed 1/29/2025, Until Thu 2/13/2025 at 2359, Normal           CONTINUE these medications which have NOT CHANGED    Details   amitriptyline (ELAVIL) 25 mg tablet Take 1 tablet (25 mg total) by mouth daily at bedtime, Starting Mon 4/25/2022, Normal      amLODIPine (NORVASC) 10 mg tablet Take 10 mg by mouth daily, Historical Med      aspirin (ECOTRIN LOW STRENGTH) 81 mg EC tablet Take 81 mg by mouth daily, Historical Med      CVS D3 25 MCG (1000 UT) capsule TAKE 2 TABLETS (2,000 UNITS TOTAL) BY MOUTH DAILY., Historical Med      ergocalciferol (ERGOCALCIFEROL) 1.25 MG (47877 UT) capsule Take 1 capsule by mouth once a week, Starting Thu 1/27/2022, Until Thu 4/21/2022, Historical Med      !! gabapentin (NEURONTIN) 300 mg capsule Take 1 capsule (300 mg total) by mouth 3 (three) times a day, Starting Fri 7/30/2021, Normal      !! gabapentin (Neurontin) 300 mg capsule Take 1 capsule (300 mg total) by mouth 3 (three) times  a day, Starting Tue 1/4/2022, Normal      methocarbamol (ROBAXIN) 500 mg tablet Take 500 mg by mouth 4 (four) times a day, Historical Med       !! - Potential duplicate medications found. Please discuss with provider.        No discharge procedures on file.  ED SEPSIS DOCUMENTATION   Time reflects when diagnosis was documented in both MDM as applicable and the Disposition within this note       Time User Action Codes Description Comment    1/29/2025  8:23 AM Cullen Baum Add [M79.89] Hand swelling     1/29/2025  8:23 AM Cullen Baum Add [M19.90] Arthritis                  Jigna Hernandez MD  01/29/25 5468

## 2025-05-02 ENCOUNTER — HOSPITAL ENCOUNTER (INPATIENT)
Facility: HOSPITAL | Age: 60
LOS: 5 days | Discharge: HOME | DRG: 182 | End: 2025-05-08
Attending: EMERGENCY MEDICINE | Admitting: INTERNAL MEDICINE
Payer: MEDICARE

## 2025-05-02 DIAGNOSIS — J98.59 MEDIASTINAL ABNORMALITY: ICD-10-CM

## 2025-05-02 DIAGNOSIS — K02.9 DENTAL CAVITY: ICD-10-CM

## 2025-05-02 DIAGNOSIS — I99.8 ISCHEMIA OF EXTREMITY: Primary | ICD-10-CM

## 2025-05-02 DIAGNOSIS — Z13.9 ENCOUNTER FOR SCREENING INVOLVING SOCIAL DETERMINANTS OF HEALTH (SDOH): ICD-10-CM

## 2025-05-02 DIAGNOSIS — K02.9 DENTAL CARIES: ICD-10-CM

## 2025-05-02 LAB
BASOPHILS # BLD AUTO: 0.04 THOUSANDS/ÂΜL (ref 0–0.1)
BASOPHILS NFR BLD AUTO: 0 % (ref 0–1)
EOSINOPHIL # BLD AUTO: 0.16 THOUSAND/ÂΜL (ref 0–0.61)
EOSINOPHIL NFR BLD AUTO: 2 % (ref 0–6)
ERYTHROCYTE [DISTWIDTH] IN BLOOD BY AUTOMATED COUNT: 13.1 % (ref 11.6–15.1)
HCT VFR BLD AUTO: 43.1 % (ref 36.5–49.3)
HGB BLD-MCNC: 14.4 G/DL (ref 12–17)
IMM GRANULOCYTES # BLD AUTO: 0.05 THOUSAND/UL (ref 0–0.2)
IMM GRANULOCYTES NFR BLD AUTO: 1 % (ref 0–2)
LYMPHOCYTES # BLD AUTO: 1.98 THOUSANDS/ÂΜL (ref 0.6–4.47)
LYMPHOCYTES NFR BLD AUTO: 19 % (ref 14–44)
MCH RBC QN AUTO: 31.7 PG (ref 26.8–34.3)
MCHC RBC AUTO-ENTMCNC: 33.4 G/DL (ref 31.4–37.4)
MCV RBC AUTO: 95 FL (ref 82–98)
MONOCYTES # BLD AUTO: 1.48 THOUSAND/ÂΜL (ref 0.17–1.22)
MONOCYTES NFR BLD AUTO: 14 % (ref 4–12)
NEUTROPHILS # BLD AUTO: 6.83 THOUSANDS/ÂΜL (ref 1.85–7.62)
NEUTS SEG NFR BLD AUTO: 64 % (ref 43–75)
NRBC BLD AUTO-RTO: 0 /100 WBCS
PLATELET # BLD AUTO: 314 THOUSANDS/UL (ref 149–390)
PMV BLD AUTO: 9.2 FL (ref 8.9–12.7)
RBC # BLD AUTO: 4.54 MILLION/UL (ref 3.88–5.62)
WBC # BLD AUTO: 10.54 THOUSAND/UL (ref 4.31–10.16)

## 2025-05-02 PROCEDURE — 85025 COMPLETE CBC W/AUTO DIFF WBC: CPT

## 2025-05-02 PROCEDURE — 36415 COLL VENOUS BLD VENIPUNCTURE: CPT

## 2025-05-02 PROCEDURE — 80048 BASIC METABOLIC PNL TOTAL CA: CPT

## 2025-05-02 PROCEDURE — 99215 OFFICE O/P EST HI 40 MIN: CPT | Performed by: SURGERY

## 2025-05-02 PROCEDURE — 85730 THROMBOPLASTIN TIME PARTIAL: CPT

## 2025-05-02 PROCEDURE — 82550 ASSAY OF CK (CPK): CPT

## 2025-05-02 PROCEDURE — 93005 ELECTROCARDIOGRAM TRACING: CPT

## 2025-05-02 PROCEDURE — 85610 PROTHROMBIN TIME: CPT

## 2025-05-02 PROCEDURE — 99285 EMERGENCY DEPT VISIT HI MDM: CPT

## 2025-05-03 ENCOUNTER — APPOINTMENT (EMERGENCY)
Dept: RADIOLOGY | Facility: HOSPITAL | Age: 60
DRG: 182 | End: 2025-05-03
Payer: MEDICARE

## 2025-05-03 ENCOUNTER — APPOINTMENT (INPATIENT)
Dept: NON INVASIVE DIAGNOSTICS | Facility: HOSPITAL | Age: 60
DRG: 182 | End: 2025-05-03
Payer: MEDICARE

## 2025-05-03 ENCOUNTER — APPOINTMENT (INPATIENT)
Dept: RADIOLOGY | Facility: HOSPITAL | Age: 60
DRG: 182 | End: 2025-05-03
Attending: RADIOLOGY
Payer: MEDICARE

## 2025-05-03 ENCOUNTER — APPOINTMENT (INPATIENT)
Dept: RADIOLOGY | Facility: HOSPITAL | Age: 60
DRG: 182 | End: 2025-05-03
Payer: MEDICARE

## 2025-05-03 PROBLEM — E78.1 HYPERTRIGLYCERIDEMIA: Status: ACTIVE | Noted: 2017-04-24

## 2025-05-03 PROBLEM — N17.9 ACUTE RENAL FAILURE (ARF) (HCC): Status: ACTIVE | Noted: 2025-05-03

## 2025-05-03 PROBLEM — I70.221 CRITICAL LIMB ISCHEMIA OF RIGHT LOWER EXTREMITY (HCC): Status: ACTIVE | Noted: 2025-05-03

## 2025-05-03 LAB
ABO GROUP BLD: NORMAL
ABO GROUP BLD: NORMAL
ANION GAP SERPL CALCULATED.3IONS-SCNC: 7 MMOL/L (ref 4–13)
ANION GAP SERPL CALCULATED.3IONS-SCNC: 9 MMOL/L (ref 4–13)
AORTIC ROOT: 3.9 CM
AORTIC VALVE MEAN VELOCITY: 7.8 M/S
APTT PPP: 110 SECONDS (ref 23–34)
APTT PPP: 29 SECONDS (ref 23–34)
ASCENDING AORTA: 3.5 CM
AV AREA BY CONTINUOUS VTI: 2.5 CM2
AV AREA PEAK VELOCITY: 2.5 CM2
AV LVOT MEAN GRADIENT: 1 MMHG
AV LVOT PEAK GRADIENT: 3 MMHG
AV MEAN PRESS GRAD SYS DOP V1V2: 3 MMHG
AV ORIFICE AREA US: 2.45 CM2
AV PEAK GRADIENT: 5 MMHG
AV VELOCITY RATIO: 0.71
AV VMAX SYS DOP: 1.11 M/S
BASOPHILS # BLD AUTO: 0.04 THOUSANDS/ÂΜL (ref 0–0.1)
BASOPHILS NFR BLD AUTO: 0 % (ref 0–1)
BLD GP AB SCN SERPL QL: NEGATIVE
BSA FOR ECHO PROCEDURE: 1.94 M2
BUN SERPL-MCNC: 22 MG/DL (ref 5–25)
BUN SERPL-MCNC: 22 MG/DL (ref 5–25)
CALCIUM SERPL-MCNC: 9.4 MG/DL (ref 8.4–10.2)
CALCIUM SERPL-MCNC: 9.7 MG/DL (ref 8.4–10.2)
CHLORIDE SERPL-SCNC: 105 MMOL/L (ref 96–108)
CHLORIDE SERPL-SCNC: 107 MMOL/L (ref 96–108)
CHOLEST SERPL-MCNC: 156 MG/DL (ref ?–200)
CK SERPL-CCNC: 169 U/L (ref 39–308)
CO2 SERPL-SCNC: 25 MMOL/L (ref 21–32)
CO2 SERPL-SCNC: 28 MMOL/L (ref 21–32)
CREAT SERPL-MCNC: 1.65 MG/DL (ref 0.6–1.3)
CREAT SERPL-MCNC: 2.64 MG/DL (ref 0.6–1.3)
DOP CALC AO VTI: 25.22 CM
DOP CALC LVOT AREA: 3.46 CM2
DOP CALC LVOT CARDIAC INDEX: 1.65 L/MIN/M2
DOP CALC LVOT CARDIAC OUTPUT: 3.2 L/MIN
DOP CALC LVOT DIAMETER: 2.1 CM
DOP CALC LVOT PEAK VEL VTI: 17.86 CM
DOP CALC LVOT PEAK VEL: 0.79 M/S
DOP CALC LVOT STROKE INDEX: 32 ML/M2
DOP CALC LVOT STROKE VOLUME: 62
E WAVE DECELERATION TIME: 177 MS
E/A RATIO: 0.88
EOSINOPHIL # BLD AUTO: 0.16 THOUSAND/ÂΜL (ref 0–0.61)
EOSINOPHIL NFR BLD AUTO: 1 % (ref 0–6)
ERYTHROCYTE [DISTWIDTH] IN BLOOD BY AUTOMATED COUNT: 13.2 % (ref 11.6–15.1)
FIBRINOGEN PPP-MCNC: 402 MG/DL (ref 206–523)
FRACTIONAL SHORTENING: 30 (ref 28–44)
GFR SERPL CREATININE-BSD FRML MDRD: 25 ML/MIN/1.73SQ M
GFR SERPL CREATININE-BSD FRML MDRD: 44 ML/MIN/1.73SQ M
GLUCOSE SERPL-MCNC: 101 MG/DL (ref 65–140)
GLUCOSE SERPL-MCNC: 103 MG/DL (ref 65–140)
HCT VFR BLD AUTO: 43.2 % (ref 36.5–49.3)
HDLC SERPL-MCNC: 38 MG/DL
HGB BLD-MCNC: 14.7 G/DL (ref 12–17)
IMM GRANULOCYTES # BLD AUTO: 0.04 THOUSAND/UL (ref 0–0.2)
IMM GRANULOCYTES NFR BLD AUTO: 0 % (ref 0–2)
INR PPP: 1.01 (ref 0.85–1.19)
INTERVENTRICULAR SEPTUM IN DIASTOLE (PARASTERNAL SHORT AXIS VIEW): 1.5 CM
INTERVENTRICULAR SEPTUM: 1.5 CM (ref 0.6–1.1)
KCT BLD-ACNC: 136 SEC (ref 89–137)
KCT BLD-ACNC: 201 SEC (ref 89–137)
LAAS-AP2: 28.1 CM2
LAAS-AP4: 23 CM2
LDLC SERPL CALC-MCNC: 99 MG/DL (ref 0–100)
LEFT ATRIUM SIZE: 3.4 CM
LEFT ATRIUM VOLUME (MOD BIPLANE): 83 ML
LEFT ATRIUM VOLUME INDEX (MOD BIPLANE): 42.8 ML/M2
LEFT INTERNAL DIMENSION IN SYSTOLE: 3 CM (ref 2.1–4)
LEFT VENTRICULAR INTERNAL DIMENSION IN DIASTOLE: 4.3 CM (ref 3.5–6)
LEFT VENTRICULAR POSTERIOR WALL IN END DIASTOLE: 1.5 CM
LEFT VENTRICULAR STROKE VOLUME: 46 ML
LV EF US.2D.A4C+ESTIMATED: 51 %
LVSV (TEICH): 46 ML
LYMPHOCYTES # BLD AUTO: 3.46 THOUSANDS/ÂΜL (ref 0.6–4.47)
LYMPHOCYTES NFR BLD AUTO: 28 % (ref 14–44)
MCH RBC QN AUTO: 31.6 PG (ref 26.8–34.3)
MCHC RBC AUTO-ENTMCNC: 34 G/DL (ref 31.4–37.4)
MCV RBC AUTO: 93 FL (ref 82–98)
MONOCYTES # BLD AUTO: 1.21 THOUSAND/ÂΜL (ref 0.17–1.22)
MONOCYTES NFR BLD AUTO: 10 % (ref 4–12)
MV E'TISSUE VEL-LAT: 9 CM/S
MV E'TISSUE VEL-SEP: 7 CM/S
MV PEAK A VEL: 0.9 M/S
MV PEAK E VEL: 79 CM/S
NEUTROPHILS # BLD AUTO: 7.48 THOUSANDS/ÂΜL (ref 1.85–7.62)
NEUTS SEG NFR BLD AUTO: 61 % (ref 43–75)
NONHDLC SERPL-MCNC: 118 MG/DL
NRBC BLD AUTO-RTO: 0 /100 WBCS
PLATELET # BLD AUTO: 312 THOUSANDS/UL (ref 149–390)
PMV BLD AUTO: 9.3 FL (ref 8.9–12.7)
POTASSIUM SERPL-SCNC: 3.7 MMOL/L (ref 3.5–5.3)
POTASSIUM SERPL-SCNC: 3.9 MMOL/L (ref 3.5–5.3)
PROTHROMBIN TIME: 13.6 SECONDS (ref 12.3–15)
RBC # BLD AUTO: 4.65 MILLION/UL (ref 3.88–5.62)
RH BLD: POSITIVE
RH BLD: POSITIVE
RIGHT ATRIUM AREA SYSTOLE A4C: 24.9 CM2
RIGHT VENTRICLE ID DIMENSION: 4.3 CM
SINOTUBULAR JUNCTION: 3.3 CM
SL CV LEFT ATRIUM LENGTH A2C: 5.9 CM
SL CV LV EF: 60
SL CV PED ECHO LEFT VENTRICLE DIASTOLIC VOLUME (MOD BIPLANE) 2D: 82 ML
SL CV PED ECHO LEFT VENTRICLE SYSTOLIC VOLUME (MOD BIPLANE) 2D: 36 ML
SL CV SINUS OF VALSALVA 2D: 3.9 CM
SODIUM SERPL-SCNC: 139 MMOL/L (ref 135–147)
SODIUM SERPL-SCNC: 142 MMOL/L (ref 135–147)
SPECIMEN EXPIRATION DATE: NORMAL
SPECIMEN SOURCE: ABNORMAL
SPECIMEN SOURCE: NORMAL
STJ: 3.3 CM
TRICUSPID ANNULAR PLANE SYSTOLIC EXCURSION: 3.2 CM
TRIGL SERPL-MCNC: 96 MG/DL (ref ?–150)
WBC # BLD AUTO: 12.39 THOUSAND/UL (ref 4.31–10.16)

## 2025-05-03 PROCEDURE — C1769 GUIDE WIRE: HCPCS

## 2025-05-03 PROCEDURE — C1760 CLOSURE DEV, VASC: HCPCS

## 2025-05-03 PROCEDURE — C1887 CATHETER, GUIDING: HCPCS

## 2025-05-03 PROCEDURE — C1894 INTRO/SHEATH, NON-LASER: HCPCS

## 2025-05-03 PROCEDURE — 04CM3ZZ EXTIRPATION OF MATTER FROM RIGHT POPLITEAL ARTERY, PERCUTANEOUS APPROACH: ICD-10-PCS | Performed by: RADIOLOGY

## 2025-05-03 PROCEDURE — 86900 BLOOD TYPING SEROLOGIC ABO: CPT | Performed by: INTERNAL MEDICINE

## 2025-05-03 PROCEDURE — 86850 RBC ANTIBODY SCREEN: CPT | Performed by: INTERNAL MEDICINE

## 2025-05-03 PROCEDURE — 85384 FIBRINOGEN ACTIVITY: CPT

## 2025-05-03 PROCEDURE — B41FYZZ FLUOROSCOPY OF RIGHT LOWER EXTREMITY ARTERIES USING OTHER CONTRAST: ICD-10-PCS | Performed by: RADIOLOGY

## 2025-05-03 PROCEDURE — 70450 CT HEAD/BRAIN W/O DYE: CPT

## 2025-05-03 PROCEDURE — 75635 CT ANGIO ABDOMINAL ARTERIES: CPT

## 2025-05-03 PROCEDURE — 86901 BLOOD TYPING SEROLOGIC RH(D): CPT | Performed by: INTERNAL MEDICINE

## 2025-05-03 PROCEDURE — 99152 MOD SED SAME PHYS/QHP 5/>YRS: CPT | Performed by: RADIOLOGY

## 2025-05-03 PROCEDURE — B41DYZZ FLUOROSCOPY OF AORTA AND BILATERAL LOWER EXTREMITY ARTERIES USING OTHER CONTRAST: ICD-10-PCS | Performed by: RADIOLOGY

## 2025-05-03 PROCEDURE — 80061 LIPID PANEL: CPT | Performed by: INTERNAL MEDICINE

## 2025-05-03 PROCEDURE — 76937 US GUIDE VASCULAR ACCESS: CPT

## 2025-05-03 PROCEDURE — 85730 THROMBOPLASTIN TIME PARTIAL: CPT

## 2025-05-03 PROCEDURE — NC001 PR NO CHARGE: Performed by: RADIOLOGY

## 2025-05-03 PROCEDURE — 99223 1ST HOSP IP/OBS HIGH 75: CPT | Performed by: INTERNAL MEDICINE

## 2025-05-03 PROCEDURE — 36415 COLL VENOUS BLD VENIPUNCTURE: CPT | Performed by: INTERNAL MEDICINE

## 2025-05-03 PROCEDURE — 37184 PRIM ART M-THRMBC 1ST VSL: CPT | Performed by: RADIOLOGY

## 2025-05-03 PROCEDURE — 96365 THER/PROPH/DIAG IV INF INIT: CPT

## 2025-05-03 PROCEDURE — 36247 INS CATH ABD/L-EXT ART 3RD: CPT | Performed by: RADIOLOGY

## 2025-05-03 PROCEDURE — 37184 PRIM ART M-THRMBC 1ST VSL: CPT

## 2025-05-03 PROCEDURE — 047M3ZZ DILATION OF RIGHT POPLITEAL ARTERY, PERCUTANEOUS APPROACH: ICD-10-PCS | Performed by: RADIOLOGY

## 2025-05-03 PROCEDURE — 96366 THER/PROPH/DIAG IV INF ADDON: CPT

## 2025-05-03 PROCEDURE — 76937 US GUIDE VASCULAR ACCESS: CPT | Performed by: RADIOLOGY

## 2025-05-03 PROCEDURE — 85025 COMPLETE CBC W/AUTO DIFF WBC: CPT | Performed by: INTERNAL MEDICINE

## 2025-05-03 PROCEDURE — C1757 CATH, THROMBECTOMY/EMBOLECT: HCPCS

## 2025-05-03 PROCEDURE — 85730 THROMBOPLASTIN TIME PARTIAL: CPT | Performed by: INTERNAL MEDICINE

## 2025-05-03 PROCEDURE — C1725 CATH, TRANSLUMIN NON-LASER: HCPCS

## 2025-05-03 PROCEDURE — 93306 TTE W/DOPPLER COMPLETE: CPT | Performed by: INTERNAL MEDICINE

## 2025-05-03 PROCEDURE — 93306 TTE W/DOPPLER COMPLETE: CPT

## 2025-05-03 PROCEDURE — 85347 COAGULATION TIME ACTIVATED: CPT

## 2025-05-03 PROCEDURE — 96375 TX/PRO/DX INJ NEW DRUG ADDON: CPT

## 2025-05-03 PROCEDURE — 37228 HB TIB/PER REVASC W/TLA: CPT

## 2025-05-03 PROCEDURE — 80048 BASIC METABOLIC PNL TOTAL CA: CPT | Performed by: INTERNAL MEDICINE

## 2025-05-03 RX ORDER — HEPARIN SODIUM 1000 [USP'U]/ML
INJECTION, SOLUTION INTRAVENOUS; SUBCUTANEOUS AS NEEDED
Status: COMPLETED | OUTPATIENT
Start: 2025-05-03 | End: 2025-05-03

## 2025-05-03 RX ORDER — LISINOPRIL 30 MG/1
30 TABLET ORAL DAILY
COMMUNITY
Start: 2025-02-14 | End: 2026-02-14

## 2025-05-03 RX ORDER — METOPROLOL SUCCINATE 50 MG/1
50 TABLET, EXTENDED RELEASE ORAL DAILY
COMMUNITY
Start: 2025-02-14 | End: 2025-05-08

## 2025-05-03 RX ORDER — ATORVASTATIN CALCIUM 40 MG/1
40 TABLET, FILM COATED ORAL
Status: DISCONTINUED | OUTPATIENT
Start: 2025-05-03 | End: 2025-05-08 | Stop reason: HOSPADM

## 2025-05-03 RX ORDER — ASPIRIN 81 MG/1
81 TABLET ORAL DAILY
Status: DISCONTINUED | OUTPATIENT
Start: 2025-05-03 | End: 2025-05-08 | Stop reason: HOSPADM

## 2025-05-03 RX ORDER — HEPARIN SODIUM 1000 [USP'U]/ML
6400 INJECTION, SOLUTION INTRAVENOUS; SUBCUTANEOUS ONCE
Status: COMPLETED | OUTPATIENT
Start: 2025-05-03 | End: 2025-05-03

## 2025-05-03 RX ORDER — FENTANYL CITRATE 50 UG/ML
INJECTION, SOLUTION INTRAMUSCULAR; INTRAVENOUS AS NEEDED
Status: COMPLETED | OUTPATIENT
Start: 2025-05-03 | End: 2025-05-03

## 2025-05-03 RX ORDER — LABETALOL HYDROCHLORIDE 5 MG/ML
10 INJECTION, SOLUTION INTRAVENOUS EVERY 4 HOURS PRN
Status: DISCONTINUED | OUTPATIENT
Start: 2025-05-03 | End: 2025-05-04

## 2025-05-03 RX ORDER — HEPARIN SODIUM 10000 [USP'U]/100ML
3-30 INJECTION, SOLUTION INTRAVENOUS
Status: DISCONTINUED | OUTPATIENT
Start: 2025-05-03 | End: 2025-05-07

## 2025-05-03 RX ORDER — IODIXANOL 320 MG/ML
60 INJECTION, SOLUTION INTRAVASCULAR
Status: COMPLETED | OUTPATIENT
Start: 2025-05-03 | End: 2025-05-03

## 2025-05-03 RX ORDER — MIDAZOLAM HYDROCHLORIDE 2 MG/2ML
INJECTION, SOLUTION INTRAMUSCULAR; INTRAVENOUS AS NEEDED
Status: COMPLETED | OUTPATIENT
Start: 2025-05-03 | End: 2025-05-03

## 2025-05-03 RX ORDER — LIDOCAINE WITH 8.4% SOD BICARB 0.9%(10ML)
SYRINGE (ML) INJECTION AS NEEDED
Status: COMPLETED | OUTPATIENT
Start: 2025-05-03 | End: 2025-05-03

## 2025-05-03 RX ORDER — AMLODIPINE BESYLATE 10 MG/1
10 TABLET ORAL DAILY
Status: DISCONTINUED | OUTPATIENT
Start: 2025-05-03 | End: 2025-05-08 | Stop reason: HOSPADM

## 2025-05-03 RX ORDER — METOPROLOL SUCCINATE 50 MG/1
50 TABLET, EXTENDED RELEASE ORAL DAILY
Status: DISCONTINUED | OUTPATIENT
Start: 2025-05-03 | End: 2025-05-07

## 2025-05-03 RX ORDER — ACETAMINOPHEN 325 MG/1
650 TABLET ORAL EVERY 6 HOURS PRN
Status: DISCONTINUED | OUTPATIENT
Start: 2025-05-03 | End: 2025-05-08 | Stop reason: HOSPADM

## 2025-05-03 RX ORDER — ONDANSETRON 2 MG/ML
4 INJECTION INTRAMUSCULAR; INTRAVENOUS EVERY 6 HOURS PRN
Status: DISCONTINUED | OUTPATIENT
Start: 2025-05-03 | End: 2025-05-08 | Stop reason: HOSPADM

## 2025-05-03 RX ORDER — SODIUM CHLORIDE, SODIUM GLUCONATE, SODIUM ACETATE, POTASSIUM CHLORIDE, MAGNESIUM CHLORIDE, SODIUM PHOSPHATE, DIBASIC, AND POTASSIUM PHOSPHATE .53; .5; .37; .037; .03; .012; .00082 G/100ML; G/100ML; G/100ML; G/100ML; G/100ML; G/100ML; G/100ML
125 INJECTION, SOLUTION INTRAVENOUS CONTINUOUS
Status: DISCONTINUED | OUTPATIENT
Start: 2025-05-03 | End: 2025-05-03

## 2025-05-03 RX ORDER — NICOTINE 21 MG/24HR
1 PATCH, TRANSDERMAL 24 HOURS TRANSDERMAL DAILY
Status: DISCONTINUED | OUTPATIENT
Start: 2025-05-03 | End: 2025-05-08 | Stop reason: HOSPADM

## 2025-05-03 RX ADMIN — HEPARIN SODIUM 6400 UNITS: 1000 INJECTION INTRAVENOUS; SUBCUTANEOUS at 00:08

## 2025-05-03 RX ADMIN — METOPROLOL SUCCINATE 50 MG: 50 TABLET, EXTENDED RELEASE ORAL at 08:51

## 2025-05-03 RX ADMIN — HEPARIN SODIUM 16 UNITS/KG/HR: 10000 INJECTION, SOLUTION INTRAVENOUS at 20:36

## 2025-05-03 RX ADMIN — ACETAMINOPHEN 650 MG: 325 TABLET, FILM COATED ORAL at 20:36

## 2025-05-03 RX ADMIN — SODIUM CHLORIDE, SODIUM GLUCONATE, SODIUM ACETATE, POTASSIUM CHLORIDE, MAGNESIUM CHLORIDE, SODIUM PHOSPHATE, DIBASIC, AND POTASSIUM PHOSPHATE 125 ML/HR: .53; .5; .37; .037; .03; .012; .00082 INJECTION, SOLUTION INTRAVENOUS at 05:09

## 2025-05-03 RX ADMIN — IODIXANOL 60 ML: 320 INJECTION, SOLUTION INTRAVASCULAR at 16:03

## 2025-05-03 RX ADMIN — FENTANYL CITRATE 25 MCG: 50 INJECTION INTRAMUSCULAR; INTRAVENOUS at 14:51

## 2025-05-03 RX ADMIN — MIDAZOLAM 1 MG: 1 INJECTION INTRAMUSCULAR; INTRAVENOUS at 14:36

## 2025-05-03 RX ADMIN — FENTANYL CITRATE 50 MCG: 50 INJECTION INTRAMUSCULAR; INTRAVENOUS at 14:36

## 2025-05-03 RX ADMIN — Medication 10 ML: at 14:51

## 2025-05-03 RX ADMIN — AMLODIPINE BESYLATE 10 MG: 10 TABLET ORAL at 08:51

## 2025-05-03 RX ADMIN — IOHEXOL 100 ML: 350 INJECTION, SOLUTION INTRAVENOUS at 00:39

## 2025-05-03 RX ADMIN — ASPIRIN 81 MG: 81 TABLET, COATED ORAL at 08:51

## 2025-05-03 RX ADMIN — SODIUM CHLORIDE, SODIUM GLUCONATE, SODIUM ACETATE, POTASSIUM CHLORIDE, MAGNESIUM CHLORIDE, SODIUM PHOSPHATE, DIBASIC, AND POTASSIUM PHOSPHATE 125 ML/HR: .53; .5; .37; .037; .03; .012; .00082 INJECTION, SOLUTION INTRAVENOUS at 03:16

## 2025-05-03 RX ADMIN — ATORVASTATIN CALCIUM 40 MG: 80 TABLET, FILM COATED ORAL at 17:42

## 2025-05-03 RX ADMIN — MIDAZOLAM 0.5 MG: 1 INJECTION INTRAMUSCULAR; INTRAVENOUS at 14:52

## 2025-05-03 RX ADMIN — HEPARIN SODIUM 18 UNITS/KG/HR: 10000 INJECTION, SOLUTION INTRAVENOUS at 00:08

## 2025-05-03 RX ADMIN — HEPARIN SODIUM 4000 UNITS: 1000 INJECTION, SOLUTION INTRAVENOUS; SUBCUTANEOUS at 15:02

## 2025-05-03 RX ADMIN — NICOTINE 1 PATCH: 14 PATCH, EXTENDED RELEASE TRANSDERMAL at 08:51

## 2025-05-03 NOTE — ASSESSMENT & PLAN NOTE
Continue PTA amlodipine 10 mg daily and metoprolol succinate 50 mg daily with strict hold parameters.  Holding PTA lisinopril due to need for vascular surgery intervention and acute renal failure  Monitor blood pressure per protocol

## 2025-05-03 NOTE — ASSESSMENT & PLAN NOTE
Patient reports history of this but is not on medication  Given likely underlying PAD atorvastatin started, will check FLP and adjust dosing as needed dependent on this result

## 2025-05-03 NOTE — ASSESSMENT & PLAN NOTE
"Patient presenting with sudden onset right lower extremity pain developing while walking, did note some paresthesias with ambulation on the way home  During ED evaluation patient noted with cool right foot compared to left, initial inability to doppler right DP/PT pulses and overall concern for critical limb ischemia  CTA abdomen with runoff as below concerning for \"short segment dissection versus occlusion of the distal left SFA, right SFA stenosis secondary to soft plaque with distal right popliteal occlusion with right PT reconstitution, abrupt termination of right PT and hindfoot region AT/DP not visualized ankle\"  Vascular surgery following, plan for revascularization this admission ideally pending correction of acute renal failure.  Await final vascular surgery attending recommendations.  If examination deteriorates we will reengage vascular surgery  On heparin gtt. VTE protocol, continue for now  Continue ASA, atorvastatin started  Continue as needed analgesia  Neurovascular checks  "

## 2025-05-03 NOTE — CASE MANAGEMENT
Case Management Assessment & Discharge Planning Note    Patient name Roland Campbell  Location [facility] 503/[facility] 503-01 MRN [id number]  : 1965 Date 5/3/2025       Current Admission Date: 2025  Current Admission Diagnosis:Critical limb ischemia of right lower extremity (HCC)   Patient Active Problem List    Diagnosis Date Noted Date Diagnosed    Critical limb ischemia of right lower extremity (HCC) 2025     Acute renal failure (ARF) (HCC) 2025     Ilioinguinal neuralgia of right side 2022     Status post right inguinal hernia repair 2021     Hypertriglyceridemia 2017     Essential hypertension 2016     Alcohol abuse 2016     Tobacco use 2016       LOS (days): 0  Geometric Mean LOS (GMLOS) (days):   Days to GMLOS:     OBJECTIVE:    Risk of Unplanned Readmission Score: 7.23         Current admission status: Inpatient       Preferred Pharmacy:   [id number] - [place], [place] - [address]  [place] [address]  Phone: [phone] Fax: [phone]    [facility] [place] - [place] - [address]  [address]  [place] [address]  Phone: [phone] Fax: [phone]    Primary Care Provider: Cristian Hutton    Primary Insurance: [employer] [facility]  Secondary Insurance:     ASSESSMENT:  Active Health Care Proxies       [facility] Care Representative - Friend   Primary Phone: [phone] (Mobile)                 Patient Information  Admitted from:: Home  Mental Status: Alert  During Assessment patient was accompanied by: Not accompanied during assessment  Assessment information provided by:: Patient  Primary Caregiver: Self  Support Systems: Friend, Other (Comment)  What city do you live in?: [place]  Home entry access options. Select all that apply.: No steps to enter home  Type of Current Residence: Apartment  Floor Level: 1  Upon entering residence, is there a bedroom  on the main floor (no further steps)?: Yes  Upon entering residence, is there a bathroom on the main floor (no further steps)?: Yes  Living Arrangements: Lives w/ Friend  Is patient a ?: No    Activities of Daily Living Prior to Admission  Functional Status: Independent  Completes ADLs independently?: Yes  Ambulates independently?: Yes  Does patient use assisted devices?: No  Does patient currently own DME?: No  Does patient have a history of Outpatient Therapy (PT/OT)?: No  Does the patient have a history of Short-Term Rehab?: No  Does patient have a history of HHC?: Yes  Does patient currently have HHC?: No    Patient Information Continued  Income Source: Pension/senior care  Does patient have prescription coverage?: Yes  Can the patient afford their medications and any related supplies (such as glucometers or test strips)?: Yes  Does patient receive dialysis treatments?: No  Does patient have a history of substance abuse?: Yes  Historical substance use preference: Alcohol/ETOH, Other  History of Withdrawal Symptoms: Denies past symptoms  Is patient currently in treatment for substance abuse?: N/A - sober  Does patient have a history of Mental Health Diagnosis?: No    Means of Transportation  Means of Transport to Appts:: Drives Self      DISCHARGE DETAILS:    Discharge planning discussed with:: Patient  Freedom of Choice: Yes  Comments - Freedom of Choice: Discussed FOC  CM contacted family/caregiver?: No- see comments (declined)       Other Referral/Resources/Interventions Provided:  Referral Comments: This CM introduced self and role to patient, lives with friend in apartment, no ALFRED.  Independent with ADLS, no DME at home.  No history of STR, OP therapy, states history of HH.  Drives self.  Reports history of drug and ETOH abuse, states he is now sober

## 2025-05-03 NOTE — QUICK NOTE
"Post-Op Check - Vascular Surgery   Roland Campbell 60 y.o. male MRN: [id number]  Unit/Bed#: [facility] 503-01 Encounter: [id number]    Assessment:  60yoM s/p RLE angiogram with suction thrombectomy of TP trunk occlusion/PTA, with residual peroneal thrombus (L femoral Vascade) with findings concerning for embolic phenomenon    Plan:  - Incentive spirometry  - Diet as tolerated  - PRN analgesia, anti-emetics  - I/Os with SHERRELL on presentation, s/p angiogram  - access site without hematoma, hep gtt restart at 1800 at previous rate, no initial bolus, okay for rebolus to maintain therapeutic PTT  - Bedrest per IR  - q4hr Neurovascular checks  - continue ASA/Statin  - Recommend CTA Chest for completion of embolic work-up prior to discharge    Subjective: Patient seen and examined at bedside, R foot with improvement/resolution of presenting symptoms. Denies paresthesias or motor weakness. No nausea, good appetite, without pain at access site    Vitals:  /68   Pulse (!) 53   Temp 98.2 °F (36.8 °C)   Resp 18   Ht 5' 8\" (1.727 m)   Wt 79.8 kg (176 lb)   SpO2 97%   BMI 26.76 kg/m²     Physical Exam:  General appearance: alert and oriented, in no acute distress  Neck: supple, symmetrical, trachea midline  Lungs:  Normal work of breathing  Heart:  regular rate  Abdomen:  Soft, nontender abdomen. L femoral access with c/d/I dressing, soft without noted hematoma.  Extremities:  Bilateral LE warm, dry, M/S intact    Pulse exam:  Femoral: Right: 2+   Right peroneal doppler signal  DP: Right: doppler signal Left: 2+  PT: Right: 2+ Left: 2+    I/Os:  I/O last 3 completed shifts:  In: 516.1 [I.V.:516.1]  Out: -   I/O this shift:  In: 830.5 [I.V.:830.5]  Out: 0     Invasive Lines/Tubes:  Invasive Devices       Peripheral Intravenous Line  Duration             Peripheral IV 05/02/25 Left Arm <1 day                  VTE Prophylaxis: Reason for no pharmacologic prophylaxis hep gtt    Beatriz Viera PA-C  5/3/2025    "

## 2025-05-03 NOTE — ED ATTENDING ATTESTATION
5/2/2025  I, Blayne Wilson DO, saw and evaluated the patient. I have discussed the patient with the resident/non-physician practitioner and agree with the resident's/non-physician practitioner's findings, Plan of Care, and MDM as documented in the resident's/non-physician practitioner's note, except where noted. All available labs and Radiology studies were reviewed.  I was present for key portions of any procedure(s) performed by the resident/non-physician practitioner and I was immediately available to provide assistance.       At this point I agree with the current assessment done in the Emergency Department.  I have conducted an independent evaluation of this patient a history and physical is as follows:    60-year-old man presents for evaluation of progressively worsening right sided leg pain from the knee down associated with prior history of claudication.  No other complaints at this time.  Patient is a current everyday smoker approximately half pack a day. he quit many years ago for approximately 6 months by doing some courses.    RLE:  Patient's foot on the right is cooler than the left.  EHL 5/5  FHL 5/5  Unable to palpate PT or DP  Unable to Doppler PT or DP    Pocus:  Able to  extremely faint monophasic waveform using 12 MHz linear ultrasound probe and visualizing the vessel.  Of note color flow was not sensitive enough to  any sort of flow however there was an extremely faint monophasic waveform and pulse with Doppler for both PT and DP locations.    Popliteal artery patent has a triphasic waveform\    Exam performed by dr caraballo under my direct supervision for diagnostic purposes on 5/2/2025 at approximately 11:30 PM images were saved and uploaded to PACS.      Impression: Acute arterial insufficiency of the right lower extremity below the knee.  Plan: CBC, CMP, coags, heparin drip, CTA with runoff, consult vascular surgery.  Will require admission for further evaluation and  treatment likely revascularization.      ED Course         Critical Care Time  Procedures    Critical Care Time Statement: Upon my evaluation, this patient had a high probability of imminent or life-threatening deterioration due to acute RLE arterial insufficiency, which required my direct attention, intervention, and personal management.  I spent a total of 40 minutes directly providing critical care services, including interpretation of complex medical databases, evaluating for the presence of life-threatening injuries or illnesses, management of organ system failure(s) , complex medical decision making (to support/prevent further life-threatening deterioration)., and titration of continuous IV medications (drips). This time is exclusive of procedures, teaching, treating other patients, family meetings, and any prior time recorded by providers other than myself.

## 2025-05-03 NOTE — ASSESSMENT & PLAN NOTE
On admission, baseline creatinine appears 1  Will trial IV fluids overnight and recheck BMP in a.m.  Measure PVR/bladder scan, check UA  Hold nephrotoxins and avoid hypotension  Given likely need for vascular intervention low threshold to obtain nephrology consultation if renal function does not rapidly normalize

## 2025-05-03 NOTE — TELEMEDICINE
e-Consult (IPC)  - Interventional Radiology  Roland Campbell 60 y.o. male MRN: [id number]  Unit/Bed#: [facility] 503-01 Encounter: [id number]          Interventional Radiology has been consulted to evaluate Roland Campbell    What procedure(s) would you like IR to perform? RLE agram   Clinical history and reason for the procedure request? right ALI (RA) w/ pop occlusion acute on chronic dz)   If pertinent, please specify laterality Right       Inpatient Consult to IR  Consult performed by: Nikki Martins MD  Consult ordered by: Ruiz Myles DO        05/03/25    Assessment/Recommendation:   Patient presented with acute onset right lower extremity pain.    CTA shows right popliteal artery occlusion.    Plan to do right lower extremity angiogram with possible thrombolysis or mechanical thrombectomy.    11-20 minutes, >50% of the total time devoted to medical consultative verbal/EMR discussion between providers. Written report will be generated in the EMR.     Thank you for allowing Interventional Radiology to participate in the care of Roland Campbell. Please don't hesitate to call or TigerText us with any questions.     Nikki Martins MD

## 2025-05-03 NOTE — ED PROVIDER NOTES
Time reflects when diagnosis was documented in both MDM as applicable and the Disposition within this note       Time User Action Codes Description Comment    5/2/2025 11:29 PM Tiny Bateman Add [I99.8] Ischemia of extremity           ED Disposition       ED Disposition   Admit    Condition   Stable    Date/Time   Sat May 3, 2025  2:44 AM    Comment   Case was discussed with BRIONNA and the patient's admission status was agreed to be Admission Status: inpatient status to the service of Dr. Menendez .               Assessment & Plan       Medical Decision Making  Roland Campbell is a 60 y.o. who presents with complaints of right leg pain     Vital signs are hypertensive, otherwise HD stable, afebrile    Ddx: concerned for limb ischemia    Plan:   POCUS performed on right DP, PT, and popliteal arteries. Decreased doppler signal at PT and DP  Blood work including coags obtained, see below  Heparin bolus and gtt   CTA with run off obtained, see below  Vascular consulted, appreciate recs. See separate note    Disposition: discussed with [facility]. Admit inpatient. Vascular plans for urgent revascularization.        Amount and/or Complexity of Data Reviewed  Labs: ordered.  Radiology: ordered.    Risk  Prescription drug management.  Decision regarding hospitalization.             Medications   heparin (porcine) 25,000 units in 0.45% NaCl 250 mL infusion (premix) (18 Units/kg/hr × 80 kg (Order-Specific) Intravenous New Bag 5/3/25 0008)   multi-electrolyte (Plasmalyte-A/Isolyte-S PH 7.4/Normosol-R) IV solution (125 mL/hr Intravenous New Bag 5/3/25 0316)   aspirin (ECOTRIN LOW STRENGTH) EC tablet 81 mg (has no administration in time range)   atorvastatin (LIPITOR) tablet 40 mg (has no administration in time range)   amLODIPine (NORVASC) tablet 10 mg (has no administration in time range)   metoprolol succinate (TOPROL-XL) 24 hr tablet 50 mg (has no administration in time range)   ondansetron (ZOFRAN) injection 4 mg (has no  administration in time range)   nicotine (NICODERM CQ) 14 mg/24hr TD 24 hr patch 1 patch (has no administration in time range)   heparin (porcine) injection 6,400 Units (6,400 Units Intravenous Given 5/3/25 0008)   iohexol (OMNIPAQUE) 350 MG/ML injection (MULTI-DOSE) 100 mL (100 mL Intravenous Given 5/3/25 0039)       ED Risk Strat Scores                    No data recorded        SBIRT 20yo+      Flowsheet Row Most Recent Value   Initial Alcohol Screen: US AUDIT-C     1. How often do you have a drink containing alcohol? 0 Filed at: 05/02/2025 2315   2. How many drinks containing alcohol do you have on a typical day you are drinking?  0 Filed at: 05/02/2025 2315   3a. Male UNDER 65: How often do you have five or more drinks on one occasion? 0 Filed at: 05/02/2025 2315   Audit-C Score 0 Filed at: 05/02/2025 2315   SANDRA: How many times in the past year have you...    Used an illegal drug or used a prescription medication for non-medical reasons? Never Filed at: 05/02/2025 2315                            History of Present Illness       Chief Complaint   Patient presents with    Medical Problem     Was walking about an hour ago when he started feeling pins and needles in his R leg along with decreased sensation, no other nuero defecits.        Past Medical History:   Diagnosis Date    Hypertension       Past Surgical History:   Procedure Laterality Date    DC RPR 1ST INGUN HRNA AGE 6 YRS/> REDUCIBLE Right 5/18/2021    Procedure: REPAIR HERNIA INGUINAL;  Surgeon: Darin Mendoza DO;  Location: [facility];  Service: General      Family History   Problem Relation Age of Onset    No Known Problems Mother     No Known Problems Father       Social History     Tobacco Use    Smoking status: Some Days    Smokeless tobacco: Never   Substance Use Topics    Alcohol use: Yes    Drug use: Never      E-Cigarette/Vaping      E-Cigarette/Vaping Substances      I have reviewed and agree with the history as documented.     Patient is a  60-year-old male with pertinent past medical history of tobacco abuse (approx. 1/2 ppd) and hypertension who presents for evaluation of right lower extremity pain.  Patient states that approximately 2 hours ago, he was walking when he felt severe pain in his right leg.  Pain started behind his knee and extended distally. Has been experiencing concurrent paraesthesias in RLE. Denies injury to affected leg. Denies leg swelling or discoloration, chest pain, or SOB.         Review of Systems   All other systems reviewed and are negative.          Objective       ED Triage Vitals   Temperature Pulse Blood Pressure Respirations SpO2 Patient Position - Orthostatic VS   05/02/25 2355 05/02/25 2316 05/02/25 2316 05/02/25 2316 05/02/25 2316 05/02/25 2316   98 °F (36.7 °C) 59 122/60 17 99 % Lying      Temp Source Heart Rate Source BP Location FiO2 (%) Pain Score    05/02/25 2355 05/02/25 2316 05/02/25 2316 -- 05/02/25 2316    Oral Monitor Right arm  8      Vitals      Date and Time Temp Pulse SpO2 Resp BP Pain Score FACES Pain Rating User   05/03/25 0200 -- 63 95 % 16 150/68 -- -- AS   05/03/25 0130 -- 59 97 % 18 146/81 -- -- AS   05/02/25 2355 98 °F (36.7 °C) -- -- -- -- -- -- AS   05/02/25 2316 -- 59 99 % 17 122/60 8 -- AS            Physical Exam  Constitutional:       General: He is not in acute distress.     Appearance: Normal appearance. He is not ill-appearing, toxic-appearing or diaphoretic.   HENT:      Head: Normocephalic and atraumatic.   Cardiovascular:      Rate and Rhythm: Normal rate and regular rhythm.      Heart sounds: Normal heart sounds.      Comments: Unable to palpate right DP or PT pulses   2+ left DP palpated     Pulmonary:      Effort: Pulmonary effort is normal.   Abdominal:      General: Abdomen is flat. There is no distension.      Palpations: Abdomen is soft.      Tenderness: There is no abdominal tenderness.   Musculoskeletal:         General: No swelling. Normal range of motion.      Cervical  back: Normal range of motion and neck supple.   Skin:     General: Skin is warm.      Comments: RLE cool    Neurological:      Mental Status: He is alert.      Sensory: No sensory deficit.      Motor: No weakness.   Psychiatric:         Mood and Affect: Mood normal.         Behavior: Behavior normal.         Results Reviewed       Procedure Component Value Units Date/Time    Urinalysis with microscopic [[id number]]     Lab Status: No result Specimen: Urine     CK [[id number]]  (Normal) Collected: 05/02/25 2337    Lab Status: Final result Specimen: Blood from Arm, Left Updated: 05/03/25 0113     Total  U/L     Basic metabolic panel [[id number]]  (Abnormal) Collected: 05/02/25 2337    Lab Status: Final result Specimen: Blood from Arm, Left Updated: 05/03/25 0011     Sodium 142 mmol/L      Potassium 3.9 mmol/L      Chloride 105 mmol/L      CO2 28 mmol/L      ANION GAP 9 mmol/L      BUN 22 mg/dL      Creatinine 2.64 mg/dL      Glucose 101 mg/dL      Calcium 9.7 mg/dL      eGFR 25 ml/min/1.73sq m     Narrative:      National Kidney Disease Foundation guidelines for Chronic Kidney Disease (CKD):     Stage 1 with normal or high GFR (GFR > 90 mL/min/1.73 square meters)    Stage 2 Mild CKD (GFR = 60-89 mL/min/1.73 square meters)    Stage 3A Moderate CKD (GFR = 45-59 mL/min/1.73 square meters)    Stage 3B Moderate CKD (GFR = 30-44 mL/min/1.73 square meters)    Stage 4 Severe CKD (GFR = 15-29 mL/min/1.73 square meters)    Stage 5 End Stage CKD (GFR <15 mL/min/1.73 square meters)  Note: GFR calculation is accurate only with a steady state creatinine    Protime-INR [[id number]]  (Normal) Collected: 05/02/25 2337    Lab Status: Final result Specimen: Blood from Arm, Left Updated: 05/03/25 0000     Protime 13.6 seconds      INR 1.01    Narrative:      INR Therapeutic Range    Indication                                             INR Range      Atrial Fibrillation                                                2.0-3.0  Hypercoagulable State                                    2.0.2.3  Left Ventricular Asist Device                            2.0-3.0  Mechanical Heart Valve                                  -    Aortic(with afib, MI, embolism, HF, LA enlargement,    and/or coagulopathy)                                     2.0-3.0 (2.5-3.5)     Mitral                                                             2.5-3.5  Prosthetic/Bioprosthetic Heart Valve               2.0-3.0  Venous thromboembolism (VTE: VT, PE        2.0-3.0    APTT [[id number]]  (Normal) Collected: 05/02/25 2337    Lab Status: Final result Specimen: Blood from Arm, Left Updated: 05/03/25 0000     PTT 29 seconds     CBC and differential [[id number]]  (Abnormal) Collected: 05/02/25 2337    Lab Status: Final result Specimen: Blood from Arm, Left Updated: 05/02/25 2356     WBC 10.54 Thousand/uL      RBC 4.54 Million/uL      Hemoglobin 14.4 g/dL      Hematocrit 43.1 %      MCV 95 fL      MCH 31.7 pg      MCHC 33.4 g/dL      RDW 13.1 %      MPV 9.2 fL      Platelets 314 Thousands/uL      nRBC 0 /100 WBCs      Segmented % 64 %      Immature Grans % 1 %      Lymphocytes % 19 %      Monocytes % 14 %      Eosinophils Relative 2 %      Basophils Relative 0 %      Absolute Neutrophils 6.83 Thousands/µL      Absolute Immature Grans 0.05 Thousand/uL      Absolute Lymphocytes 1.98 Thousands/µL      Absolute Monocytes 1.48 Thousand/µL      Eosinophils Absolute 0.16 Thousand/µL      Basophils Absolute 0.04 Thousands/µL             CTA abdominal w run off w wo contrast   Final Interpretation by Jose Luis King MD (05/03 0230)      1.  No abdominal aortic aneurysm or dissection. Scattered calcific atherosclerosis of the abdominal aorta, iliac arteries, and lower extremity arteries as described above.   2.  Very short segment of dissection versus vessel bifurcation involving the distal left superficial femoral artery is seen.   3.  Short segment of mild to moderate luminal  narrowing of the distal right superficial femoral artery due to soft plaque is seen.   4.  There is abrupt complete occlusion of the distal right popliteal artery with reconstitution of the right posterior tibial artery more inferiorly. Abrupt termination of the right posterior tibial artery in the hindfoot region consistent with    age-indeterminate occlusion. Nonvisualization of the right tarsal arch. Right anterior tibial artery/dorsalis pedis artery is not visualized in the right ankle region.   5.  No acute intra-abdominal/pelvic abnormalities noted with findings detailed above.      Workstation performed: [id number]             Procedures    ED Medication and Procedure Management   Prior to Admission Medications   Prescriptions Last Dose Informant Patient Reported? Taking?   [facility] D3 25 MCG (1000 UT) capsule Not Taking  Yes No   Sig: TAKE 2 TABLETS (2,000 UNITS TOTAL) BY MOUTH DAILY.   Patient not taking: Reported on 5/3/2025   amLODIPine (NORVASC) 10 mg tablet   Yes No   Sig: Take 10 mg by mouth daily   amitriptyline (ELAVIL) 25 mg tablet Not Taking  No No   Sig: Take 1 tablet (25 mg total) by mouth daily at bedtime   Patient not taking: Reported on 5/3/2025   aspirin (ECOTRIN LOW STRENGTH) 81 mg EC tablet   Yes No   Sig: Take 81 mg by mouth daily   ergocalciferol (ERGOCALCIFEROL) 1.25 MG (77623 UT) capsule   Yes No   Sig: Take 1 capsule by mouth once a week   gabapentin (NEURONTIN) 300 mg capsule Not Taking  No No   Sig: Take 1 capsule (300 mg total) by mouth 3 (three) times a day   Patient not taking: Reported on 1/7/2022   gabapentin (Neurontin) 300 mg capsule Not Taking  No No   Sig: Take 1 capsule (300 mg total) by mouth 3 (three) times a day   Patient not taking: Reported on 2/14/2022   ibuprofen (MOTRIN) 400 mg tablet   No No   Sig: Take 1 tablet (400 mg total) by mouth every 6 (six) hours as needed for moderate pain for up to 15 days   lisinopril (ZESTRIL) 30 mg tablet   Yes Yes   Sig: Take 30 mg by  mouth daily   methocarbamol (ROBAXIN) 500 mg tablet Not Taking Self Yes No   Sig: Take 500 mg by mouth 4 (four) times a day   Patient not taking: Reported on 1/7/2022   metoprolol succinate (TOPROL-XL) 50 mg 24 hr tablet   Yes Yes   Sig: Take 50 mg by mouth daily      Facility-Administered Medications: None     Patient's Medications   Discharge Prescriptions    No medications on file     No discharge procedures on file.  ED SEPSIS DOCUMENTATION   Time reflects when diagnosis was documented in both MDM as applicable and the Disposition within this note       Time User Action Codes Description Comment    5/2/2025 11:29 PM Tiny Bateman Add [I99.8] Ischemia of extremity                  Tiny Bateman MD  05/03/25 0357

## 2025-05-03 NOTE — SEDATION DOCUMENTATION
Lower extremity angiogram performed by Dr. Martins. Patient tolerated well. Accessed left groin, thrombectomy performed, closed with Vascade. Bedrest for 4 hours starting at 1545. Report given to P5 RN.

## 2025-05-03 NOTE — ASSESSMENT & PLAN NOTE
60yoM with history of tobacco use (0.5 PPD), HTN, HLD, R inguinal hernia repair '21, R hip arthroplasty '22, who presents with acute onset RLE pain that started 2100 5/2 while ambulating with associated paresthesias to R foot and R calf pain.  Vascular surgery consulted with concern for acute limb ischemia    5/3/25 CTA Abdomen with runoff  -  R SFA mild-mod stenosis 2/2 soft plaque, distal R pop occlusion with R PT reconstitution, abrupt termination in R PT in hindfoot region AT/DP not visualized at ankle.     WBC 10.54  Hgb 14.4  Plt 314    sCr 2.64  eGFR 25    Recommendations:  - Patient with acute onset RLE pain/paresthesias concerning for acute on chronic limb threatening ischemia in setting of ongoing tobacco use  - Fortunately at time of evaluation patient motor and sensory intact, palpable femoral pulses, R DP/PT signals present.   - Continue hep gtt, q4hr neurovascular checks, alert vascular surgery with any acute change.  - In the setting of the above, patient would benefit from revascularization  - Appreciate medicine service for admission, patient also noted to be in SHERRELL on presentation, would benefit from correction of SHERRELL prior to intervention  - Continue home ASA  - In setting of PAD, recommend initiation of high intensity statin  - PRN pain control  - Discussed with Dr. Myles  - Reach out to [facility]ascular Surgery Resident/AP role with further questions/concerns

## 2025-05-03 NOTE — H&P
"H&P - Hospitalist   Name: Roland Campbell 60 y.o. male I MRN: [id number]  Unit/Bed#: ED 02 I Date of Admission: 5/2/2025   Date of Service: 5/3/2025 I Hospital Day: 0     Assessment & Plan  Critical limb ischemia of right lower extremity (HCC)  Patient presenting with sudden onset right lower extremity pain developing while walking, did note some paresthesias with ambulation on the way home  During ED evaluation patient noted with cool right foot compared to left, initial inability to doppler right DP/PT pulses and overall concern for critical limb ischemia  CTA abdomen with runoff as below concerning for \"short segment dissection versus occlusion of the distal left SFA, right SFA stenosis secondary to soft plaque with distal right popliteal occlusion with right PT reconstitution, abrupt termination of right PT and hindfoot region AT/DP not visualized ankle\"  Vascular surgery following, plan for revascularization this admission ideally pending correction of acute renal failure.  Await final vascular surgery attending recommendations.  If examination deteriorates we will reengage vascular surgery  On heparin gtt. VTE protocol, continue for now  Continue ASA, atorvastatin started  Continue as needed analgesia  Neurovascular checks  Acute renal failure (ARF) (HCC)  On admission, baseline creatinine appears 1  Will trial IV fluids overnight and recheck BMP in a.m.  Measure PVR/bladder scan, check UA  Hold nephrotoxins and avoid hypotension  Given likely need for vascular intervention low threshold to obtain nephrology consultation if renal function does not rapidly normalize  Essential hypertension  Continue PTA amlodipine 10 mg daily and metoprolol succinate 50 mg daily with strict hold parameters.  Holding PTA lisinopril due to need for vascular surgery intervention and acute renal failure  Monitor blood pressure per protocol  Hypertriglyceridemia  Patient reports history of this but is not on medication  Given likely " underlying PAD atorvastatin started, will check FLP and adjust dosing as needed dependent on this result  Tobacco use  Counseled the patient on need for complete cessation  Provide nicotine patch while admitted      VTE Prophylaxis: Heparin Drip  / sequential compression device   Code Status: Level 1 - Full Code   Discussion with family: Offered however patient declines at this time    Anticipated Length of Stay:  Patient will be admitted on an Inpatient basis with an anticipated length of stay of greater than 2 midnights.   Justification for Hospital Stay: Please see detailed plans noted above.    Chief Complaint:     Sudden onset of right leg pain and paresthesias  History of Present Illness:  Roland Campbell is a 60 y.o. male who has a past medical history significant for hypertension, hyperlipidemia, nicotine dependence currently 1/2 pack cigarettes per day, prior history of alcohol abuse who presented with sudden onset right leg pain and paresthesias.  Patient stated he was ambulating earlier this evening when he noted the sudden onset of right lower extremity pain without trauma/falls described as sharp/severe, 10 out of 10, need for frequent rest to walk home, and development of some paresthesias.  He denied any focal weakness, fever/chills, chest pain/pressure, shortness breath, or systemic symptoms.  Given the degree of pain he presented here for further evaluation.    On initial ED evaluation patient was noted with cool right lower extremity compared to left and initial inability to obtain right DP/PT pulses.  Vascular surgery was alerted and heparin gtt. started, with subsequent labs revealing acute kidney injury.  He subsequently underwent a CTA abdominal runoff with concerns for distal occlusions as detailed below.  Vascular surgery tentatively is planning for revascularization this admission ideally pending improvement of acute renal failure, and patient admitted to medicine service for further  management as above.    Review of Systems:    Constitutional:  Denies fever or chills   Eyes:  Denies change in visual acuity   HENT:  Denies nasal congestion or sore throat   Respiratory:  Denies cough or shortness of breath   Cardiovascular:  Denies chest pain or edema   GI:  Denies abdominal pain, nausea, vomiting, bloody stools or diarrhea   :  Denies dysuria   Musculoskeletal:  Denies back pain but reported right leg pain  Integument:  Denies rash   Neurologic:  Denies headache, focal weakness but reported sensory changes  Endocrine:  Denies polyuria or polydipsia   Lymphatic:  Denies swollen glands   Psychiatric:  Denies depression or anxiety     Past Medical and Surgical History:   Past Medical History:   Diagnosis Date    Hypertension      Past Surgical History:   Procedure Laterality Date    NV RPR 1ST INGUN HRNA AGE 6 YRS/> REDUCIBLE Right 5/18/2021    Procedure: REPAIR HERNIA INGUINAL;  Surgeon: Darin Mendoza DO;  Location: [facility];  Service: General       Meds/Allergies:  Current Outpatient Medications   Medication Instructions    amitriptyline (ELAVIL) 25 mg, Oral, Daily at bedtime    amLODIPine (NORVASC) 10 mg, Oral, Daily    aspirin (ECOTRIN LOW STRENGTH) 81 mg, Oral, Daily    [employer] D3 25 MCG (1000 UT) capsule TAKE 2 TABLETS (2,000 UNITS TOTAL) BY MOUTH DAILY.    ergocalciferol (ERGOCALCIFEROL) 1.25 MG (62252 UT) capsule 1 capsule, Oral, Weekly    gabapentin (NEURONTIN) 300 mg, Oral, 3 times daily    gabapentin (NEURONTIN) 300 mg, Oral, 3 times daily    ibuprofen (MOTRIN) 400 mg, Oral, Every 6 hours PRN    lisinopril (ZESTRIL) 30 mg, Daily    methocarbamol (ROBAXIN) 500 mg, 4 times daily    metoprolol succinate (TOPROL-XL) 50 mg, Daily         Allergies:   Allergies   Allergen Reactions    Gabapentin Other (See Comments)     Suicidal Ideations    Pollen Extract Allergic Rhinitis     History:  Marital Status: Single     Substance Use History:   Social History     Substance and Sexual Activity    Alcohol Use Yes     Social History     Tobacco Use   Smoking Status Some Days   Smokeless Tobacco Never     Social History     Substance and Sexual Activity   Drug Use Never       Family History:  Family History   Problem Relation Age of Onset    No Known Problems Mother     No Known Problems Father        Physical Exam:     Vitals:   Blood Pressure: 150/68 (05/03/25 0200)  Pulse: 63 (05/03/25 0200)  Temperature: 98 °F (36.7 °C) (05/02/25 2355)  Temp Source: Oral (05/02/25 2355)  Respirations: 16 (05/03/25 0200)  Weight - Scale: 83.5 kg (184 lb) (05/02/25 2354)  SpO2: 95 % (05/03/25 0200)    Constitutional:  Well developed, well nourished, no acute distress, non-toxic appearance   Eyes:  PERRL, conjunctiva normal   HENT:  Atraumatic, external ears normal, nose normal, oropharynx moist, no pharyngeal exudates. Neck- normal range of motion, no tenderness, supple   Respiratory:  No respiratory distress, normal breath sounds, no rales, no wheezing   Cardiovascular:  Normal rate, normal rhythm, no murmurs, no gallops, no rubs   GI:  Soft, nondistended, normal bowel sounds, nontender, no organomegaly, no mass, no rebound, no guarding   :  No costovertebral angle tenderness   Musculoskeletal:  No edema, no tenderness, no deformities, right foot mildly cool compared to left foot. Back- no tenderness  Integument:  Well hydrated, no rash   Lymphatic:  No lymphadenopathy noted   Neurologic:  Alert &awake, communicative, CN 2-12 normal, normal motor function, normal sensory function, no focal deficits noted   Psychiatric:  Speech and behavior appropriate       Lab Results: I have reviewed laboratory reports/results from this admission    Results from last 7 days   Lab Units 05/02/25  2337   WBC Thousand/uL 10.54*   HEMOGLOBIN g/dL 14.4   HEMATOCRIT % 43.1   PLATELETS Thousands/uL 314   SEGS PCT % 64   LYMPHO PCT % 19   MONO PCT % 14*   EOS PCT % 2     Results from last 7 days   Lab Units 05/02/25  2337   SODIUM mmol/L 142    POTASSIUM mmol/L 3.9   CHLORIDE mmol/L 105   CO2 mmol/L 28   BUN mg/dL 22   CREATININE mg/dL 2.64*   ANION GAP mmol/L 9   CALCIUM mg/dL 9.7   GLUCOSE RANDOM mg/dL 101     Results from last 7 days   Lab Units 05/02/25  2337   INR  1.01                   EKG: Personally reviewed, normal sinus rhythm HR 59, possible LAE    Imaging: Results Review Statement: I reviewed radiology reports from this admission including: CTA abdominal with runoff.    CTA abdominal w run off w wo contrast  Result Date: 5/3/2025  Narrative: CT ANGIOGRAM OF THE AORTA AND LOWER EXTREMITIES WITH IV CONTRAST INDICATION: Cold right foot COMPARISON: None. TECHNIQUE: CT angiogram examination of the abdomen, pelvis, and lower extremities was performed according to standard protocol with intravenous contrast. This examination, like all CT scans performed in the [facility], was performed utilizing techniques to minimize radiation dose exposure, including the use of iterative reconstruction and automated exposure control. 3D reconstructions were performed an independent workstation, and are supplied for review. Rad dose 2583 mGy-cm. IV Contrast: 100 mL of iohexol Enteric Contrast: Not administered. FINDINGS: VESSELS: Mild scattered calcific atherosclerosis of the abdominal aorta, iliac arteries, and lower extremity arteries. No abdominal aortic aneurysm or dissection. No significant flow-limiting stenosis of the abdominal aorta or proximal abdominal/pelvic branches. Celiac artery, superior mesenteric artery, bilateral renal arteries, inferior mesenteric artery, common/internal/external iliac arteries, and common femoral arteries are widely patent. Mild calcific atherosclerosis in the left common femoral artery is seen without significant stenosis. Left profunda femoral artery and proximal branches are patent. The proximal to mid left superficial femoral artery appear patent with normal course and caliber. Very short segment of  dissection versus vessel bifurcation involving the distal left superficial femoral artery is seen. The left popliteal artery, trifurcation, anterior tibial artery, posterior tibial artery, and peroneal artery appear grossly patent. Short tapering of the left peroneal artery noted. The left dorsalis pedis artery and posterior tibial artery appear widely patent in the left ankle and tarsal arch regions. Mild calcific atherosclerosis of the right common femoral artery, proximal superficial femoral artery, and proximal profundofemoral artery without significant stenosis. Remainder of the right profunda femoris and superficial femoral arteries appear widely patent. Short segment of mild to moderate luminal narrowing of the distal right superficial femoral artery due to soft plaque is seen. The proximal to mid right popliteal artery is widely patent. There is abrupt complete occlusion of the distal right popliteal artery with reconstitution of the right posterior tibial artery more inferiorly. The right dorsalis pedis and posterior tibial arteries appear patent in the right ankle region. Abrupt termination of the posterior tibial artery in the hindfoot region. Nonvisualization of the right tarsal arch. Right anterior tibial artery/dorsalis pedis artery is not visualized in the right ankle region. OTHER FINDINGS ABDOMEN LOWER CHEST: Bibasilar atelectasis. No pleural or pericardial effusions. LIVER/BILIARY TREE: A few small subcentimeter hypoattenuating lesions, too small to characterize but statistically likely benign, which do not require follow-up (ACR White Paper 2017). No suspicious mass. Normal hepatic contours. No biliary dilation. GALLBLADDER: No calcified gallstones. No pericholecystic inflammatory change. SPLEEN: Unremarkable. PANCREAS: Unremarkable. ADRENAL GLANDS: Unremarkable. KIDNEYS/URETERS: No hydronephrosis or urinary tract calculi. Subcentimeter hypoattenuating renal lesion(s), too small to characterize  but statistically likely benign, which do not warrant follow-up (Radiology June 2019). PELVIS REPRODUCTIVE ORGANS: Unremarkable for patient's age. URINARY BLADDER: Mildly distended and grossly unremarkable. ADDITIONAL ABDOMINAL AND PELVIC STRUCTURES STOMACH AND BOWEL: Stomach is contracted limiting evaluation. No gross intraluminal mass. The small and large bowel loops are normal in course and caliber without evidence for obstruction or inflammation. Terminal ileum and appendix are grossly unremarkable. APPENDIX: No findings to suggest appendicitis. ABDOMINOPELVIC CAVITY: No ascites. No pneumoperitoneum. No lymphadenopathy. ABDOMINAL WALL/INGUINAL REGIONS: Unremarkable. BONES: No acute fracture or suspicious osseous lesion. Total right hip prosthesis in normal anatomical alignment. Old healed right lateral 10th rib fracture deformity with callus formation.     Impression: 1.  No abdominal aortic aneurysm or dissection. Scattered calcific atherosclerosis of the abdominal aorta, iliac arteries, and lower extremity arteries as described above. 2.  Very short segment of dissection versus vessel bifurcation involving the distal left superficial femoral artery is seen. 3.  Short segment of mild to moderate luminal narrowing of the distal right superficial femoral artery due to soft plaque is seen. 4.  There is abrupt complete occlusion of the distal right popliteal artery with reconstitution of the right posterior tibial artery more inferiorly. Abrupt termination of the right posterior tibial artery in the hindfoot region consistent with age-indeterminate occlusion. Nonvisualization of the right tarsal arch. Right anterior tibial artery/dorsalis pedis artery is not visualized in the right ankle region. 5.  No acute intra-abdominal/pelvic abnormalities noted with findings detailed above. Workstation performed: [id number]         ** Please Note: Dragon 360 Dictation voice to text software was used in the creation of this  document. **

## 2025-05-03 NOTE — DISCHARGE INSTRUCTIONS
Procedural Sedation   WHAT YOU NEED TO KNOW:   Procedural sedation is medicine used during procedures to help you feel relaxed and calm. You will remember little to none of the procedure. After sedation you may feel tired, weak, or unsteady on your feet. You may also have trouble concentrating or short-term memory loss. These symptoms should go away in 24 hours or less.   DISCHARGE INSTRUCTIONS:   Call 911 or have someone else call for any of the following:   You have sudden trouble breathing.     You cannot be woken.     Contact Interventional Radiology at [phone]   [facility] PATIENTS: Contact Interventional Radiology at [phone] [facility] PATIENTS: Contact Interventional Radiology at [phone]) if any of the following occur:      You have a severe headache or dizziness.     Your heart is beating faster than usual.    You have a fever or chills.     Your skin is itchy, swollen, or you have a rash.     You have nausea or are vomiting for more than 8 hours after the procedure.      You have questions or concerns about your condition or care.  Self-care:   Have someone stay with you for 24 hours. This person can drive you to errands and help you do things around the house. This person can also watch for problems.      Rest and do quiet activities for 24 hours. Do not exercise, ride a bike, or play sports. Stand up slowly to prevent dizziness and falls. Take short walks around the house with another person. Slowly return to your usual activities the next day.      Do not drive or use dangerous machines or tools for 24 hours. You may injure yourself or others. Examples include a lawnmower, saw, or drill. Do not return to work for 24 hours if you use dangerous machines or tools for work.      Do not make important decisions for 24 hours. For example, do not sign important papers or invest money.      Drink liquids as directed. Liquids help flush the sedation medicine out of your body. Ask how much liquid to drink  each day and which liquids are best for you.      Eat small, frequent meals to prevent nausea and vomiting. Start with clear liquids such as juice or broth. If you do not vomit after clear liquids, you can eat your usual foods.      Do not drink alcohol or take medicines that make you drowsy. This includes medicines that help you sleep and anxiety medicines. Ask your healthcare provider if it is safe for you to take pain medicine.  Follow up with your healthcare provider as directed: Write down your questions so you remember to ask them during your visits.

## 2025-05-03 NOTE — BRIEF OP NOTE (RAD/CATH)
INTERVENTIONAL RADIOLOGY PROCEDURE NOTE    Date: 5/3/2025    Procedure: IR LOWER EXTREMITY ANGIOGRAM     Preoperative diagnosis:   1. Ischemia of extremity    2. Encounter for screening involving social determinants of health (SDoH)         Postoperative diagnosis: Same.    Surgeon: Nikki Martins MD     Assistant: None. No qualified resident was available.    Blood loss: 100 cc    Specimens: none    Findings:     Occlusion of the tibioperoneal trunk.    Suction thrombectomy with penumbra CAT 6.    Balloon angioplasty with a 3 mm x 6 cm balloon.    Restored inline flow through a robust posterior tibial artery.    Chronically occluded AT.    Residual thrombus/embolus in the peroneal artery, which we thought should not affect foot perfusion and therefore treatment was deferred.    The appearance of the aspirated material raises suspicion for an embolic source.    Recommendation:  Embolic workup.  Restart heparin drip at 6 PM 5/3/2025 without bolus.  Left leg straight for 4 hours.        Complications: None immediate.    Anesthesia: conscious sedation      Vascular Quality Initiative - Peripheral Vascular Intervention     Urgency: Urgent    Functional Status:  Fully active; able to carry on all predisease activities without restriction.   Ambulation: Amb = independently ambulatory    Leg Symptoms    Right: Acute Ischemia:  Acute limb ischemia is defined as a sudden decrease in limb perfusion that causes a potential threat to limb viability (manifested by ischemic rest pain, ischemic ulcers, and/or gangrene) in patients who present within two weeks of the acute event  Viable limbs: are under no immediate threat of tissue loss. There is no sensory loss or muscle weakness and both arterial and venous Doppler signals are audible.  Left: Asymptomatic:  documented peripheral arterial disease without symptoms of claudication or ischemic pain      Treatment of Native Artery to Maintain Bypass Patency?:  No    COVID  Information  COVID Symptoms Pre-Procedure: Asymptomatic    Treatment Delayed by Pandemic: None    Access   Number of Sites: 1     Access Site 1:     Side 1: Left    Site 1: Femoral Retro to Antegrade    Access Guidance 1:U/S    Largest Sheath Size 1: 6 Fr.    Closure Device 1: Vascade      Number of Closure Devices: 1     Closure Device Outcome: Closure device successful         Procedure  Fluoro Time: 9.2 minutes  Contrast Volume: Visipaque 60 ml  CO2: no  Anticoagulant: Heparin  Protamine: No  If Creatinine is > 1.2 or missing, KAYLA Prophylaxis none     Treatment Details  Indication: Occlusive Disease,    Completion Assessment  Artery 1 treated: TP Trunk  Right                  Was this Site previously treated?: No          TASC Grade: B          Total Treated Length: 6 cm          Total Occluded Length: 4 cm          Calcification: Mild (calcification on one side of artery > half length of lesion)          Number of Treatment types (Devices):   1           Device 1          Treatment Type: Plain Balloon          Concomitant: Suction Thrombectomy   Planned, Current Procedure          Technical result: Successful (stenosis <=30%)      None     Post Procedure  Patient currently taking: Statin, Yes      Antiplatelet Medication, Yes    Procedure Complications: No

## 2025-05-03 NOTE — CONSULTS
Consultation - Vascular Surgery   Name: Roland Campbell 60 y.o. male I MRN: [id number]  Unit/Bed#: ED 02 I Date of Admission: 5/2/2025   Date of Service: 5/3/2025 I Hospital Day: 0     Inpatient consult to Vascular Surgery  Consult performed by: Beatriz Viera PA-C  Consult ordered by: Blayne Wilson,         Physician Requesting Evaluation: Blayne Wilson,*   Reason for Evaluation / Principal Problem: Acute RLE arterial occlusion    Assessment & Plan  Critical limb ischemia of right lower extremity (HCC)  60yoM with history of tobacco use (0.5 PPD), HTN, HLD, R inguinal hernia repair '21, R hip arthroplasty '22, who presents with acute onset RLE pain that started 2100 5/2 while ambulating with associated paresthesias to R foot and R calf pain.  Vascular surgery consulted with concern for acute limb ischemia    5/3/25 CTA Abdomen with runoff  -  R SFA mild-mod stenosis 2/2 soft plaque, distal R pop occlusion with R PT reconstitution, abrupt termination in R PT in hindfoot region AT/DP not visualized at ankle.     WBC 10.54  Hgb 14.4  Plt 314    sCr 2.64  eGFR 25    Recommendations:  - Patient with acute onset RLE pain/paresthesias concerning for acute on chronic limb threatening ischemia in setting of ongoing tobacco use  - Fortunately at time of evaluation patient motor and sensory intact, palpable femoral pulses, R DP/PT signals present.   - Continue hep gtt, q4hr neurovascular checks, alert vascular surgery with any acute change.  - In the setting of the above, patient would benefit from revascularization  - Appreciate medicine service for admission, patient also noted to be in SHERRELL on presentation, would benefit from correction of SHERRELL prior to intervention  - Continue home ASA  - In setting of PAD, recommend initiation of high intensity statin  - PRN pain control  - Discussed with Dr. Myles  - Reach out to [facility] Vascular Surgery Resident/AP role with further questions/concerns    I have  discussed the above management plan in detail with the primary service.     History of Present Illness   Roland Campbell is a 60 y.o. male who presents with acute onset RLE pain that started around 2100 5/2/25 abrupt in onset, patient has never experienced such pain before, denies previous calf pain with ambulation or symptoms consistent with rest pain. Now with some persistent pain to RLE, needed to take multiple breaks to walk home, endorsed some paresthesias with ambulation on the way home.     Patient does endorse tobacco use 0.5 PPD for 37 years. Patient also states he has recent history of concern for TIA event, in April had woken up with visual changes, slurred speech and ambulatory dysfunction that resolved throughout the day without residual deficits. Maintained on ASA at home.     Ambulates and completes ADLs independently.    Review of Systems   Constitutional:  Negative for chills and fever.   Respiratory:  Negative for shortness of breath.    Cardiovascular:  Negative for chest pain.   Gastrointestinal:  Negative for abdominal distention.   Skin:  Negative for wound.        Right foot cooler than left   Neurological:  Positive for numbness. Negative for weakness.   Psychiatric/Behavioral:  The patient is not nervous/anxious.      Historical Information   Past Medical History:   Diagnosis Date    Hypertension      Past Surgical History:   Procedure Laterality Date    [place] RPR 1ST INGUN HRNA AGE 6 YRS/> REDUCIBLE Right 5/18/2021    Procedure: REPAIR HERNIA INGUINAL;  Surgeon: Darin Mendoza DO;  Location: [facility];  Service: General     Social History     Tobacco Use    Smoking status: Some Days    Smokeless tobacco: Never   Substance and Sexual Activity    Alcohol use: Yes    Drug use: Never    Sexual activity: Not on file     E-Cigarette/Vaping     E-Cigarette/Vaping Substances     Family History   Problem Relation Age of Onset    No Known Problems Mother     No Known Problems Father        Objective  :  Temp:  [98 °F (36.7 °C)] 98 °F (36.7 °C)  HR:  [59] 59  BP: (122-146)/(60-81) 146/81  Resp:  [17-18] 18  SpO2:  [97 %-99 %] 97 %  O2 Device: None (Room air)    I/O       None            Physical Exam  Constitutional:       General: He is not in acute distress.  HENT:      Head: Normocephalic and atraumatic.   Eyes:      Extraocular Movements: Extraocular movements intact.   Cardiovascular:      Rate and Rhythm: Normal rate.      Pulses:           Radial pulses are 2+ on the right side and 2+ on the left side.        Femoral pulses are 2+ on the right side and 2+ on the left side.       Popliteal pulses are 2+ on the right side and 2+ on the left side.        Dorsalis pedis pulses are detected w/ Doppler on the right side and 2+ on the left side.        Posterior tibial pulses are detected w/ Doppler on the right side and 2+ on the left side.   Pulmonary:      Effort: Pulmonary effort is normal. No respiratory distress.   Abdominal:      Palpations: Abdomen is soft.      Tenderness: There is no abdominal tenderness.   Musculoskeletal:      Cervical back: Neck supple.   Skin:     General: Skin is warm and dry.      Comments: Warm and dry throughout for the exception of R foot which is cooler than left, no noted open wounds to R foot.    Neurological:      Mental Status: He is alert.      Comments: Able to localize multiple areas of light touch to bilateral feet. Intact bilateral movement of toes, dorsi/plantarflexion, hip flexion.    Psychiatric:         Behavior: Behavior is cooperative.          Lab Results: I have reviewed the following results:  Recent Labs     05/02/25  2337   WBC 10.54*   HGB 14.4   HCT 43.1      SODIUM 142   K 3.9      CO2 28   BUN 22   CREATININE 2.64*   GLUC 101   PTT 29   INR 1.01       Imaging Results Review: I reviewed radiology reports from this admission including: CTA as above.    VTE Prophylaxis: Reason for no pharmacologic prophylaxis hep gtt

## 2025-05-03 NOTE — QUICK NOTE
Patient seen and examined.  Agree with plan from vascular surgery and Dr. Menendez overnight.  They did consult IR for right popliteal artery occlusion.  Plan to do right lower extremity angiogram with possible thrombolysis or mechanical thrombectomy.

## 2025-05-03 NOTE — PLAN OF CARE
Problem: PAIN - ADULT  Goal: Verbalizes/displays adequate comfort level or baseline comfort level  Description: Interventions:- Encourage patient to monitor pain and request assistance- Assess pain using appropriate pain scale- Administer analgesics based on type and severity of pain and evaluate response- Implement non-pharmacological measures as appropriate and evaluate response- Consider cultural and social influences on pain and pain management- Notify physician/advanced practitioner if interventions unsuccessful or patient reports new pain  Outcome: Progressing     Problem: INFECTION - ADULT  Goal: Absence or prevention of progression during hospitalization  Description: INTERVENTIONS:- Assess and monitor for signs and symptoms of infection- Monitor lab/diagnostic results- Monitor all insertion sites, i.e. indwelling lines, tubes, and drains- Monitor endotracheal if appropriate and nasal secretions for changes in amount and color- [place] appropriate cooling/warming therapies per order- Administer medications as ordered- Instruct and encourage patient and family to use good hand hygiene technique- Identify and instruct in appropriate isolation precautions for identified infection/condition  Outcome: Progressing  Goal: Absence of fever/infection during neutropenic period  Description: INTERVENTIONS:- Monitor WBC  Outcome: Progressing     Problem: DISCHARGE PLANNING  Goal: Discharge to home or other facility with appropriate resources  Description: INTERVENTIONS:- Identify barriers to discharge w/patient and caregiver- Arrange for needed discharge resources and transportation as appropriate- Identify discharge learning needs (meds, wound care, etc.)- Arrange for interpretive services to assist at discharge as needed- Refer to Case Management Department for coordinating discharge planning if the patient needs post-hospital services based on physician/advanced practitioner order or complex needs related to  functional status, cognitive ability, or social support system  Outcome: Progressing     Problem: Knowledge Deficit  Goal: Patient/family/caregiver demonstrates understanding of disease process, treatment plan, medications, and discharge instructions  Description: Complete learning assessment and assess knowledge base.Interventions:- Provide teaching at level of understanding- Provide teaching via preferred learning methods  Outcome: Progressing     Problem: NEUROSENSORY - ADULT  Goal: Achieves stable or improved neurological status  Description: INTERVENTIONS- Monitor and report changes in neurological status- Monitor vital signs such as temperature, blood pressure, glucose, and any other labs ordered - Initiate measures to prevent increased intracranial pressure- Monitor for seizure activity and implement precautions if appropriate    Outcome: Progressing  Goal: Achieves maximal functionality and self care  Description: INTERVENTIONS- Monitor swallowing and airway patency with patient fatigue and changes in neurological status- Encourage and assist patient to increase activity and self care. - Encourage visually impaired, hearing impaired and aphasic patients to use assistive/communication devices  Outcome: Progressing     Problem: CARDIOVASCULAR - ADULT  Goal: Maintains optimal cardiac output and hemodynamic stability  Description: INTERVENTIONS:- Monitor I/O, vital signs and rhythm- Monitor for S/S and trends of decreased cardiac output- Administer and titrate ordered vasoactive medications to optimize hemodynamic stability- Assess quality of pulses, skin color and temperature- Assess for signs of decreased coronary artery perfusion- Instruct patient to report change in severity of symptoms  Outcome: Progressing  Goal: Absence of cardiac dysrhythmias or at baseline rhythm  Description: INTERVENTIONS:- Continuous cardiac monitoring, vital signs, obtain 12 lead EKG if ordered- Administer antiarrhythmic and heart  rate control medications as ordered- Monitor electrolytes and administer replacement therapy as ordered  Outcome: Progressing     Problem: MUSCULOSKELETAL - ADULT  Goal: Maintain or return mobility to safest level of function  Description: INTERVENTIONS:- Assess patient's ability to carry out ADLs; assess patient's baseline for ADL function and identify physical deficits which impact ability to perform ADLs (bathing, care of mouth/teeth, toileting, grooming, dressing, etc.)- Assess/evaluate cause of self-care deficits - Assess range of motion- Assess patient's mobility- Assess patient's need for assistive devices and provide as appropriate- Encourage maximum independence but intervene and supervise when necessary- Involve family in performance of ADLs- Assess for home care needs following discharge - Consider OT consult to assist with ADL evaluation and planning for discharge- Provide patient education as appropriate  Outcome: Progressing  Goal: Maintain proper alignment of affected body part  Description: INTERVENTIONS:- Support, maintain and protect limb and body alignment- Provide patient/ family with appropriate education  Outcome: Progressing

## 2025-05-04 ENCOUNTER — APPOINTMENT (INPATIENT)
Dept: RADIOLOGY | Facility: HOSPITAL | Age: 60
DRG: 182 | End: 2025-05-04
Payer: MEDICARE

## 2025-05-04 PROBLEM — K02.9 DENTAL CARIES: Status: ACTIVE | Noted: 2025-05-04

## 2025-05-04 PROBLEM — R93.0 ABNORMAL CT OF THE HEAD: Status: ACTIVE | Noted: 2025-05-04

## 2025-05-04 LAB
ANION GAP SERPL CALCULATED.3IONS-SCNC: 7 MMOL/L (ref 4–13)
APTT PPP: 58 SECONDS (ref 23–34)
APTT PPP: 64 SECONDS (ref 23–34)
APTT PPP: 70 SECONDS (ref 23–34)
ATRIAL RATE: 59 BPM
BASOPHILS # BLD AUTO: 0.05 THOUSANDS/ÂΜL (ref 0–0.1)
BASOPHILS NFR BLD AUTO: 1 % (ref 0–1)
BUN SERPL-MCNC: 16 MG/DL (ref 5–25)
CALCIUM SERPL-MCNC: 9.4 MG/DL (ref 8.4–10.2)
CHLORIDE SERPL-SCNC: 104 MMOL/L (ref 96–108)
CO2 SERPL-SCNC: 27 MMOL/L (ref 21–32)
CREAT SERPL-MCNC: 0.93 MG/DL (ref 0.6–1.3)
EOSINOPHIL # BLD AUTO: 0.32 THOUSAND/ÂΜL (ref 0–0.61)
EOSINOPHIL NFR BLD AUTO: 3 % (ref 0–6)
ERYTHROCYTE [DISTWIDTH] IN BLOOD BY AUTOMATED COUNT: 12.6 % (ref 11.6–15.1)
GFR SERPL CREATININE-BSD FRML MDRD: 89 ML/MIN/1.73SQ M
GLUCOSE SERPL-MCNC: 112 MG/DL (ref 65–140)
HCT VFR BLD AUTO: 43.9 % (ref 36.5–49.3)
HGB BLD-MCNC: 15 G/DL (ref 12–17)
IMM GRANULOCYTES # BLD AUTO: 0.02 THOUSAND/UL (ref 0–0.2)
IMM GRANULOCYTES NFR BLD AUTO: 0 % (ref 0–2)
LYMPHOCYTES # BLD AUTO: 2.71 THOUSANDS/ÂΜL (ref 0.6–4.47)
LYMPHOCYTES NFR BLD AUTO: 29 % (ref 14–44)
MCH RBC QN AUTO: 31.8 PG (ref 26.8–34.3)
MCHC RBC AUTO-ENTMCNC: 34.2 G/DL (ref 31.4–37.4)
MCV RBC AUTO: 93 FL (ref 82–98)
MONOCYTES # BLD AUTO: 1.02 THOUSAND/ÂΜL (ref 0.17–1.22)
MONOCYTES NFR BLD AUTO: 11 % (ref 4–12)
NEUTROPHILS # BLD AUTO: 5.37 THOUSANDS/ÂΜL (ref 1.85–7.62)
NEUTS SEG NFR BLD AUTO: 56 % (ref 43–75)
NRBC BLD AUTO-RTO: 0 /100 WBCS
P AXIS: 64 DEGREES
PLATELET # BLD AUTO: 331 THOUSANDS/UL (ref 149–390)
PMV BLD AUTO: 9.6 FL (ref 8.9–12.7)
POTASSIUM SERPL-SCNC: 3.4 MMOL/L (ref 3.5–5.3)
PR INTERVAL: 142 MS
QRS AXIS: 7 DEGREES
QRSD INTERVAL: 90 MS
QT INTERVAL: 444 MS
QTC INTERVAL: 439 MS
RBC # BLD AUTO: 4.71 MILLION/UL (ref 3.88–5.62)
SODIUM SERPL-SCNC: 138 MMOL/L (ref 135–147)
T WAVE AXIS: -89 DEGREES
VENTRICULAR RATE: 59 BPM
WBC # BLD AUTO: 9.49 THOUSAND/UL (ref 4.31–10.16)

## 2025-05-04 PROCEDURE — 71275 CT ANGIOGRAPHY CHEST: CPT

## 2025-05-04 PROCEDURE — 99233 SBSQ HOSP IP/OBS HIGH 50: CPT

## 2025-05-04 PROCEDURE — 85730 THROMBOPLASTIN TIME PARTIAL: CPT

## 2025-05-04 PROCEDURE — 85025 COMPLETE CBC W/AUTO DIFF WBC: CPT | Performed by: STUDENT IN AN ORGANIZED HEALTH CARE EDUCATION/TRAINING PROGRAM

## 2025-05-04 PROCEDURE — 70355 PANORAMIC X-RAY OF JAWS: CPT

## 2025-05-04 PROCEDURE — 93010 ELECTROCARDIOGRAM REPORT: CPT | Performed by: INTERNAL MEDICINE

## 2025-05-04 PROCEDURE — 99232 SBSQ HOSP IP/OBS MODERATE 35: CPT | Performed by: SURGERY

## 2025-05-04 PROCEDURE — 80048 BASIC METABOLIC PNL TOTAL CA: CPT | Performed by: STUDENT IN AN ORGANIZED HEALTH CARE EDUCATION/TRAINING PROGRAM

## 2025-05-04 RX ORDER — HYDROMORPHONE HCL/PF 1 MG/ML
0.5 SYRINGE (ML) INJECTION EVERY 4 HOURS PRN
Refills: 0 | Status: DISCONTINUED | OUTPATIENT
Start: 2025-05-04 | End: 2025-05-08 | Stop reason: HOSPADM

## 2025-05-04 RX ORDER — OXYCODONE HYDROCHLORIDE 5 MG/1
5 TABLET ORAL EVERY 6 HOURS PRN
Refills: 0 | Status: DISCONTINUED | OUTPATIENT
Start: 2025-05-04 | End: 2025-05-08 | Stop reason: HOSPADM

## 2025-05-04 RX ORDER — SODIUM CHLORIDE, SODIUM GLUCONATE, SODIUM ACETATE, POTASSIUM CHLORIDE, MAGNESIUM CHLORIDE, SODIUM PHOSPHATE, DIBASIC, AND POTASSIUM PHOSPHATE .53; .5; .37; .037; .03; .012; .00082 G/100ML; G/100ML; G/100ML; G/100ML; G/100ML; G/100ML; G/100ML
100 INJECTION, SOLUTION INTRAVENOUS CONTINUOUS
Status: DISCONTINUED | OUTPATIENT
Start: 2025-05-04 | End: 2025-05-05

## 2025-05-04 RX ORDER — LABETALOL HYDROCHLORIDE 5 MG/ML
10 INJECTION, SOLUTION INTRAVENOUS EVERY 4 HOURS PRN
Status: DISCONTINUED | OUTPATIENT
Start: 2025-05-04 | End: 2025-05-08 | Stop reason: HOSPADM

## 2025-05-04 RX ADMIN — AMOXICILLIN AND CLAVULANATE POTASSIUM 1 TABLET: 875; 125 TABLET, COATED ORAL at 16:38

## 2025-05-04 RX ADMIN — HEPARIN SODIUM 18 UNITS/KG/HR: 10000 INJECTION, SOLUTION INTRAVENOUS at 14:14

## 2025-05-04 RX ADMIN — NICOTINE 1 PATCH: 14 PATCH, EXTENDED RELEASE TRANSDERMAL at 08:25

## 2025-05-04 RX ADMIN — IOHEXOL 80 ML: 350 INJECTION, SOLUTION INTRAVENOUS at 13:10

## 2025-05-04 RX ADMIN — SODIUM CHLORIDE, SODIUM GLUCONATE, SODIUM ACETATE, POTASSIUM CHLORIDE, MAGNESIUM CHLORIDE, SODIUM PHOSPHATE, DIBASIC, AND POTASSIUM PHOSPHATE 100 ML/HR: .53; .5; .37; .037; .03; .012; .00082 INJECTION, SOLUTION INTRAVENOUS at 22:10

## 2025-05-04 RX ADMIN — SODIUM CHLORIDE, SODIUM GLUCONATE, SODIUM ACETATE, POTASSIUM CHLORIDE, MAGNESIUM CHLORIDE, SODIUM PHOSPHATE, DIBASIC, AND POTASSIUM PHOSPHATE 100 ML/HR: .53; .5; .37; .037; .03; .012; .00082 INJECTION, SOLUTION INTRAVENOUS at 14:15

## 2025-05-04 RX ADMIN — LABETALOL HYDROCHLORIDE 10 MG: 5 INJECTION, SOLUTION INTRAVENOUS at 22:38

## 2025-05-04 RX ADMIN — ACETAMINOPHEN 650 MG: 325 TABLET, FILM COATED ORAL at 06:09

## 2025-05-04 RX ADMIN — ACETAMINOPHEN 650 MG: 325 TABLET, FILM COATED ORAL at 20:54

## 2025-05-04 RX ADMIN — ACETAMINOPHEN 650 MG: 325 TABLET, FILM COATED ORAL at 14:17

## 2025-05-04 RX ADMIN — AMOXICILLIN AND CLAVULANATE POTASSIUM 1 TABLET: 875; 125 TABLET, COATED ORAL at 20:54

## 2025-05-04 RX ADMIN — OXYCODONE HYDROCHLORIDE 5 MG: 5 TABLET ORAL at 17:44

## 2025-05-04 RX ADMIN — AMLODIPINE BESYLATE 10 MG: 10 TABLET ORAL at 08:23

## 2025-05-04 RX ADMIN — METOPROLOL SUCCINATE 50 MG: 50 TABLET, EXTENDED RELEASE ORAL at 08:23

## 2025-05-04 RX ADMIN — ATORVASTATIN CALCIUM 40 MG: 80 TABLET, FILM COATED ORAL at 16:38

## 2025-05-04 RX ADMIN — OXYCODONE HYDROCHLORIDE 5 MG: 5 TABLET ORAL at 10:48

## 2025-05-04 RX ADMIN — ASPIRIN 81 MG: 81 TABLET, COATED ORAL at 08:22

## 2025-05-04 NOTE — ASSESSMENT & PLAN NOTE
Lab Results   Component Value Date    CREATININE 0.93 05/04/2025    CREATININE 1.65 (H) 05/03/2025    CREATININE 2.64 (H) 05/02/2025       Lab Results   Component Value Date    EGFR 89 05/04/2025    EGFR 44 05/03/2025    EGFR 25 05/02/2025       On admission, baseline creatinine appears 1  Will trial IV fluids overnight and recheck BMP in a.m.  Measure PVR/bladder scan, check UA  Hold nephrotoxins and avoid hypotension  Improved creatinine.

## 2025-05-04 NOTE — ASSESSMENT & PLAN NOTE
Continue PTA amlodipine 10 mg daily and metoprolol succinate 50 mg daily with strict hold parameters.  Holding PTA lisinopril due to need for vascular surgery intervention and acute renal failure  Labetalol for blood pressure greater than 160 systolic

## 2025-05-04 NOTE — PROGRESS NOTES
"Progress Note - Hospitalist   Name: Roland Campbell 60 y.o. male I MRN: [id number]  Unit/Bed#: [facility] 503-01 I Date of Admission: 5/2/2025   Date of Service: 5/4/2025 I Hospital Day: 1     Assessment & Plan  Critical limb ischemia of right lower extremity (HCC)  Patient presenting with sudden onset right lower extremity pain developing while walking, did note some paresthesias with ambulation on the way home  During ED evaluation patient noted with cool right foot compared to left, initial inability to doppler right DP/PT pulses and overall concern for critical limb ischemia  CTA abdomen with runoff as below concerning for \"short segment dissection versus occlusion of the distal left SFA, right SFA stenosis secondary to soft plaque with distal right popliteal occlusion with right PT reconstitution, abrupt termination of right PT and hindfoot region AT/DP not visualized ankle\"  Vascular surgery following, plan for revascularization this admission ideally pending correction of acute renal failure.  Await final vascular surgery attending recommendations.  If examination deteriorates we will reengage vascular surgery  On heparin gtt. VTE protocol, continue for now  Discussed with vascular surgery and will keep her on this for now.  In case there are other procedures that need to be done  Getting CT chest abdomen pelvis to complete vascular source evaluation  Will start on IV for renal protection  KAREN as advised by vascular will consult cards for this    Continue ASA, atorvastatin started  Continue as needed analgesia  Neurovascular checks  Abnormal CT of the head    Disproportionate enlargement of the third and lateral ventricles relative to size of cortical sulci at the vertex is a nonspecific finding which could relate to asymmetric central white matter volume loss or normal pressure hydrocephalus.    At this time patient does not have any urinary incontinence symptoms as well as ambulatory he says a few months ago he " did have some ambulatory dysfunction however this is now resolved  Dysfunction  PT OT  Will get neurology to evaluate in the outpatient setting.     Dental caries  Patient currently has swelling of the right side of face with tenderness of the upper part of his mandible.  At this time noted to have some dental caries at that spot  Will get a Panorex x-ray  Did consult OMFS  Started on Augmentin  NPO at midnight for possible surgery  Acute renal failure (ARF) ([facility])  Lab Results   Component Value Date    CREATININE 0.93 05/04/2025    CREATININE 1.65 (H) 05/03/2025    CREATININE 2.64 (H) 05/02/2025       Lab Results   Component Value Date    EGFR 89 05/04/2025    EGFR 44 05/03/2025    EGFR 25 05/02/2025       On admission, baseline creatinine appears 1  Will trial IV fluids overnight and recheck BMP in a.m.  Measure PVR/bladder scan, check UA  Hold nephrotoxins and avoid hypotension  Improved creatinine.  Essential hypertension  Continue PTA amlodipine 10 mg daily and metoprolol succinate 50 mg daily with strict hold parameters.  Holding PTA lisinopril due to need for vascular surgery intervention and acute renal failure  Labetalol for blood pressure greater than 160 systolic    Hypertriglyceridemia  Lab Results   Component Value Date    CHOLESTEROL 156 05/03/2025    LDLCALC 99 05/03/2025    HDL 38 (L) 05/03/2025     Continue atorvastatin 40 mg once daily, aspirin 81 mg once daily    Tobacco use  Counseled the patient on need for complete cessation  Provide nicotine patch while admitted    VTE Pharmacologic Prophylaxis: VTE Score: 4 Moderate Risk (Score 3-4) - Pharmacological DVT Prophylaxis Ordered: heparin drip.    Mobility:   Basic Mobility Inpatient Raw Score: 23  JH-HLM Goal: 7: Walk 25 feet or more  JH-HLM Achieved: 4: Move to chair/commode  JH-HLM Goal NOT achieved. Continue with multidisciplinary rounding and encourage appropriate mobility to improve upon JH-HLM goals.    Patient Centered Rounds: I performed  bedside rounds with nursing staff today.   Discussions with Specialists or Other Care Team Provider: None    Education and Discussions with Family / Patient: Patient declined call to .     Current Length of Stay: 1 day(s)  Current Patient Status: Inpatient   Certification Statement: The patient will continue to require additional inpatient hospital stay due to Ischemic limb workup  Discharge Plan: Anticipate discharge in 24-48 hrs to home.    Code Status: Level 1 - Full Code    Subjective   Patient does not report and headaches, shortness of breath, chest pain, abdominal pain, nausea vomiting, lower leg edema. Also reports good sleep      Objective :  Temp:  [97.5 °F (36.4 °C)-98.5 °F (36.9 °C)] 97.5 °F (36.4 °C)  HR:  [51-62] 57  BP: (138-207)/(68-97) 146/81  Resp:  [18-20] 20  SpO2:  [94 %-100 %] 97 %  O2 Device: None (Room air)  FiO2 (%):  [21] 21    Body mass index is 26.76 kg/m².     Input and Output Summary (last 24 hours):     Intake/Output Summary (Last 24 hours) at 5/4/2025 1406  Last data filed at 5/4/2025 1300  Gross per 24 hour   Intake 2076.31 ml   Output 1075 ml   Net 1001.31 ml       Physical Exam  Vitals and nursing note reviewed.   Constitutional:       General: He is not in acute distress.     Appearance: He is well-developed.   HENT:      Head: Normocephalic and atraumatic.   Eyes:      Conjunctiva/sclera: Conjunctivae normal.   Cardiovascular:      Rate and Rhythm: Normal rate and regular rhythm.      Heart sounds: No murmur heard.  Pulmonary:      Effort: Pulmonary effort is normal. No respiratory distress.      Breath sounds: Normal breath sounds.   Abdominal:      Palpations: Abdomen is soft.      Tenderness: There is no abdominal tenderness.   Musculoskeletal:         General: No swelling.      Cervical back: Neck supple.   Skin:     General: Skin is warm and dry.      Capillary Refill: Capillary refill takes less than 2 seconds.   Neurological:      Mental Status: He is alert.    Psychiatric:         Mood and Affect: Mood normal.           Lines/Drains:        Telemetry:  Telemetry Orders (From admission, onward)               24 Hour Telemetry Monitoring  Continuous x 24 Hours (Telem)        Expiring   Question:  Reason for 24 Hour Telemetry  Answer:  Arrhythmias requiring acute medical intervention / PPM or ICD malfunction                                  Lab Results: I have reviewed the following results:   Results from last 7 days   Lab Units 05/04/25  1044   WBC Thousand/uL 9.49   HEMOGLOBIN g/dL 15.0   HEMATOCRIT % 43.9   PLATELETS Thousands/uL 331   SEGS PCT % 56   LYMPHO PCT % 29   MONO PCT % 11   EOS PCT % 3     Results from last 7 days   Lab Units 05/04/25  1044   SODIUM mmol/L 138   POTASSIUM mmol/L 3.4*   CHLORIDE mmol/L 104   CO2 mmol/L 27   BUN mg/dL 16   CREATININE mg/dL 0.93   ANION GAP mmol/L 7   CALCIUM mg/dL 9.4   GLUCOSE RANDOM mg/dL 112     Results from last 7 days   Lab Units 05/02/25  2337   INR  1.01                   Recent Cultures (last 7 days):         Last 24 Hours Medication List:     Current Facility-Administered Medications:     acetaminophen (TYLENOL) tablet 650 mg, Q6H PRN    amLODIPine (NORVASC) tablet 10 mg, Daily    amoxicillin-clavulanate (AUGMENTIN) 875-125 mg per tablet 1 tablet, Q12H LAUREN    aspirin (ECOTRIN LOW STRENGTH) EC tablet 81 mg, Daily    atorvastatin (LIPITOR) tablet 40 mg, Daily With Dinner    heparin (porcine) 25,000 units in 0.45% NaCl 250 mL infusion (premix), Titrated, Last Rate: 18 Units/kg/hr (05/04/25 0025)    HYDROmorphone (DILAUDID) injection 0.5 mg, Q4H PRN    labetalol (NORMODYNE) injection 10 mg, Q4H PRN    metoprolol succinate (TOPROL-XL) 24 hr tablet 50 mg, Daily    multi-electrolyte (Plasmalyte-A/Isolyte-S PH 7.4/Normosol-R) IV solution, Continuous    nicotine (NICODERM CQ) 14 mg/24hr TD 24 hr patch 1 patch, Daily    ondansetron (ZOFRAN) injection 4 mg, Q6H PRN    oxyCODONE (ROXICODONE) IR tablet 5 mg, Q6H  PRN    Administrative Statements   Today, Patient Was Seen By: Darin Gunderson MD  I have spent a total time of 60 minutes in caring for this patient on the day of the visit/encounter including Diagnostic results, Prognosis, Risks and benefits of tx options, Instructions for management, Patient and family education, Importance of tx compliance, Risk factor reductions, Impressions, Counseling / Coordination of care, Documenting in the medical record, Reviewing/placing orders in the medical record (including tests, medications, and/or procedures), Obtaining or reviewing history  , and Communicating with other healthcare professionals .    **Please Note: This note may have been constructed using a voice recognition system.**

## 2025-05-04 NOTE — ASSESSMENT & PLAN NOTE
Lab Results   Component Value Date    CHOLESTEROL 156 05/03/2025    LDLCALC 99 05/03/2025    HDL 38 (L) 05/03/2025     Continue atorvastatin 40 mg once daily, aspirin 81 mg once daily

## 2025-05-04 NOTE — ASSESSMENT & PLAN NOTE
Patient currently has swelling of the right side of face with tenderness of the upper part of his mandible.  At this time noted to have some dental caries at that spot  Will get a Panorex x-ray  Did consult OMFS  Started on Augmentin  NPO at midnight for possible surgery

## 2025-05-04 NOTE — ASSESSMENT & PLAN NOTE
"Patient presenting with sudden onset right lower extremity pain developing while walking, did note some paresthesias with ambulation on the way home  During ED evaluation patient noted with cool right foot compared to left, initial inability to doppler right DP/PT pulses and overall concern for critical limb ischemia  CTA abdomen with runoff as below concerning for \"short segment dissection versus occlusion of the distal left SFA, right SFA stenosis secondary to soft plaque with distal right popliteal occlusion with right PT reconstitution, abrupt termination of right PT and hindfoot region AT/DP not visualized ankle\"  Vascular surgery following, plan for revascularization this admission ideally pending correction of acute renal failure.  Await final vascular surgery attending recommendations.  If examination deteriorates we will reengage vascular surgery  On heparin gtt. VTE protocol, continue for now  Discussed with vascular surgery and will keep her on this for now.  In case there are other procedures that need to be done  Getting CT chest abdomen pelvis to complete vascular source evaluation  Will start on IV for renal protection  KAREN as advised by vascular will consult cards for this    Continue ASA, atorvastatin started  Continue as needed analgesia  Neurovascular checks  "

## 2025-05-04 NOTE — PROGRESS NOTES
"Progress Note - Vascular Surgery   Roland Campbell 60 y.o. male MRN: [id number]  Unit/Bed#: [facility] 503-01 Encounter: [id number]    Assessment:  60yoM with history of tobacco use (0.5 PPD), HTN, HLD, R inguinal hernia repair '21, R hip arthroplasty '22 with c/o acute onset RLE pain found to have acute right pop occlusion on imaging, concerning for acute limb ischemia, R1.     5/3 - RLE arteriogram, mechanical suction thrombectomy and plain angioplasty of TPT     Plan:  - Revascularization w/ in-line single vessel flow into foot via PT    - continue neurovascular checks   - Concern for cardioembolic event    - Continue hep gtt, stable for transition to DOAC from vascular perspective    - Echo w/ EF 60%, no valvular vegetations or mural thrombus    - Recommend CTA chest once Cr. Normalizes for completion of work up   - Continue atorvastatin   - Pain control as needed   - Encourage ambulation and IS use   - Remainder of care per primary team    ** Please contact the [facility] Vascular Surgery Resident/AP role for any questions or concerns that might arise     Subjective/Objective    Subjective: No acute events overnight. Doing well this AM, no active complaints, states right leg feels much better, denies paresthesias.      Objective:     Blood pressure 164/80, pulse 62, temperature 98.3 °F (36.8 °C), resp. rate 20, height 5' 8\" (1.727 m), weight 79.8 kg (176 lb), SpO2 96%.,Body mass index is 26.76 kg/m².      Intake/Output Summary (Last 24 hours) at 5/4/2025 0802  Last data filed at 5/4/2025 0500  Gross per 24 hour   Intake 2084.69 ml   Output 1075 ml   Net 1009.69 ml       Invasive Devices       Peripheral Intravenous Line  Duration             Peripheral IV 05/02/25 Left Arm 1 day    Peripheral IV 05/03/25 Right Antecubital 1 day                    Physical Exam:   GEN: NAD  HEENT: NCAT  CV: RRR  Lung: Normal effort  Ab: Soft, NT/ND  Extrem: left groin soft no ecchymosis, b/l LE M/S intact, b/l PT 2+   Neuro: A+Ox3     Lab, " Imaging and other studies:I have personally reviewed pertinent lab results.     VTE Pharmacologic Prophylaxis: Heparin  VTE Mechanical Prophylaxis: sequential compression device    Recent Results (from the past 36 hours)   ECG 12 lead    Collection Time: 05/02/25 11:18 PM   Result Value Ref Range    Ventricular Rate 59 BPM    Atrial Rate 59 BPM    MA Interval 142 ms    QRSD Interval 90 ms    QT Interval 444 ms    QTC Interval 439 ms    P Peculiar 64 degrees    QRS Axis 7 degrees    T Wave Axis -89 degrees   CBC and differential    Collection Time: 05/02/25 11:37 PM   Result Value Ref Range    WBC 10.54 (H) 4.31 - 10.16 Thousand/uL    RBC 4.54 3.88 - 5.62 Million/uL    Hemoglobin 14.4 12.0 - 17.0 g/dL    Hematocrit 43.1 36.5 - 49.3 %    MCV 95 82 - 98 fL    MCH 31.7 26.8 - 34.3 pg    MCHC 33.4 31.4 - 37.4 g/dL    RDW 13.1 11.6 - 15.1 %    MPV 9.2 8.9 - 12.7 fL    Platelets 314 149 - 390 Thousands/uL    nRBC 0 /100 WBCs    Segmented % 64 43 - 75 %    Immature Grans % 1 0 - 2 %    Lymphocytes % 19 14 - 44 %    Monocytes % 14 (H) 4 - 12 %    Eosinophils Relative 2 0 - 6 %    Basophils Relative 0 0 - 1 %    Absolute Neutrophils 6.83 1.85 - 7.62 Thousands/µL    Absolute Immature Grans 0.05 0.00 - 0.20 Thousand/uL    Absolute Lymphocytes 1.98 0.60 - 4.47 Thousands/µL    Absolute Monocytes 1.48 (H) 0.17 - 1.22 Thousand/µL    Eosinophils Absolute 0.16 0.00 - 0.61 Thousand/µL    Basophils Absolute 0.04 0.00 - 0.10 Thousands/µL   Basic metabolic panel    Collection Time: 05/02/25 11:37 PM   Result Value Ref Range    Sodium 142 135 - 147 mmol/L    Potassium 3.9 3.5 - 5.3 mmol/L    Chloride 105 96 - 108 mmol/L    CO2 28 21 - 32 mmol/L    ANION GAP 9 4 - 13 mmol/L    BUN 22 5 - 25 mg/dL    Creatinine 2.64 (H) 0.60 - 1.30 mg/dL    Glucose 101 65 - 140 mg/dL    Calcium 9.7 8.4 - 10.2 mg/dL    eGFR 25 ml/min/1.73sq m   Protime-INR    Collection Time: 05/02/25 11:37 PM   Result Value Ref Range    Protime 13.6 12.3 - 15.0 seconds    INR  1.01 0.85 - 1.19   APTT    Collection Time: 05/02/25 11:37 PM   Result Value Ref Range    PTT 29 23 - 34 seconds   CK    Collection Time: 05/02/25 11:37 PM   Result Value Ref Range    Total  39 - 308 U/L   Type and screen    Collection Time: 05/03/25  3:26 AM   Result Value Ref Range    ABO Grouping B     Rh Factor Positive     Antibody Screen Negative     Specimen Expiration Date [id number]    CBC and differential    Collection Time: 05/03/25  6:33 AM   Result Value Ref Range    WBC 12.39 (H) 4.31 - 10.16 Thousand/uL    RBC 4.65 3.88 - 5.62 Million/uL    Hemoglobin 14.7 12.0 - 17.0 g/dL    Hematocrit 43.2 36.5 - 49.3 %    MCV 93 82 - 98 fL    MCH 31.6 26.8 - 34.3 pg    MCHC 34.0 31.4 - 37.4 g/dL    RDW 13.2 11.6 - 15.1 %    MPV 9.3 8.9 - 12.7 fL    Platelets 312 149 - 390 Thousands/uL    nRBC 0 /100 WBCs    Segmented % 61 43 - 75 %    Immature Grans % 0 0 - 2 %    Lymphocytes % 28 14 - 44 %    Monocytes % 10 4 - 12 %    Eosinophils Relative 1 0 - 6 %    Basophils Relative 0 0 - 1 %    Absolute Neutrophils 7.48 1.85 - 7.62 Thousands/µL    Absolute Immature Grans 0.04 0.00 - 0.20 Thousand/uL    Absolute Lymphocytes 3.46 0.60 - 4.47 Thousands/µL    Absolute Monocytes 1.21 0.17 - 1.22 Thousand/µL    Eosinophils Absolute 0.16 0.00 - 0.61 Thousand/µL    Basophils Absolute 0.04 0.00 - 0.10 Thousands/µL   Basic metabolic panel    Collection Time: 05/03/25  6:33 AM   Result Value Ref Range    Sodium 139 135 - 147 mmol/L    Potassium 3.7 3.5 - 5.3 mmol/L    Chloride 107 96 - 108 mmol/L    CO2 25 21 - 32 mmol/L    ANION GAP 7 4 - 13 mmol/L    BUN 22 5 - 25 mg/dL    Creatinine 1.65 (H) 0.60 - 1.30 mg/dL    Glucose 103 65 - 140 mg/dL    Calcium 9.4 8.4 - 10.2 mg/dL    eGFR 44 ml/min/1.73sq m   Lipid panel    Collection Time: 05/03/25  6:33 AM   Result Value Ref Range    Cholesterol 156 See Comment mg/dL    Triglycerides 96 See Comment mg/dL    HDL, Direct 38 (L) >=40 mg/dL    LDL Calculated 99 0 - 100 mg/dL    Non-HDL-Chol  (CHOL-HDL) 118 mg/dl   APTT    Collection Time: 05/03/25  6:33 AM   Result Value Ref Range     (H) 23 - 34 seconds   ABORh Recheck - Contact Blood Bank Prior to Collection    Collection Time: 05/03/25  6:33 AM   Result Value Ref Range    ABO Grouping B     Rh Factor Positive    Echo complete w/ contrast if indicated    Collection Time: 05/03/25 12:00 PM   Result Value Ref Range    Sinus of Valsalva, 2D 3.9 cm    RAA A4C 24.9 cm2    LA Volume Index (BP) 42.8 mL/m2    MV Peak A Joao 0.9 m/s    MV Peak E Joao 79 cm/s    AV peak gradient 5 mmHg    LVOT stroke volume 62.00     Ao VTI 25.22 cm    Aortic valve peak velocity 1.11 m/s    LVOT peak VTI 17.86 cm    LVOT peak joao 0.79 m/s    LVOT diameter 2.1 cm    E wave deceleration time 177 ms    E/A ratio 0.88     AV LVOT peak gradient 3 mmHg    AV mean gradient 3 mmHg    AV area peak joao 2.5 cm2    AV area by cont VTI 2.5 cm2    LVOT mn grad 1.0 mmHg    RVID d 4.3 cm    A4C EF 51 %    Aortic valve mean velocity 7.80 m/s    Left ventricular stroke volume (2D) 46.00 mL    IVSd 1.50 cm    Tricuspid annular plane systolic excursion 3.20 cm    Ao root 3.90 cm    Ao STJ 3.30 cm    LVPWd 1.50 cm    LA size 3.4 cm    Asc Ao 3.5 cm    STJ 3.3 cm    LA volume (BP) 83 mL    FS 30 28 - 44    LVIDS 3.00 cm    IVS 1.5 cm    LVIDd 4.30 cm    LA length (A2C) 5.90 cm    LEFT VENTRICLE SYSTOLIC VOLUME (MOD BIPLANE) 2D 36 mL    LV DIASTOLIC VOLUME (MOD BIPLANE) 2D 82 mL    LVOT Cardiac Index 1.65 l/min/m2    LVOT stroke volume index 32.00 ml/m2    LVOT Cardiac Output 3.20 l/min    Left Atrium Area-systolic Four Chamber 23 cm2    Left Atrium Area-systolic Apical Two Chamber 28.1 cm2    MV E' Tissue Velocity Lateral 9 cm/s    MV E' Tissue Velocity Septal 7 cm/s    LVSV, 2D 46 mL    BSA 1.94 m2    LVOT area 3.46 cm2    DVI 0.71     AV valve area 2.45 cm2    LV EF 60    Fibrinogen    Collection Time: 05/03/25 12:09 PM   Result Value Ref Range    Fibrinogen 402 206 - 523 mg/dL   POCT  activated clotting time    Collection Time: 05/03/25  2:59 PM   Result Value Ref Range    Activated Clotting Time, i-STAT 136 89 - 137 sec    Specimen Type ARTERIAL    POCT activated clotting time    Collection Time: 05/03/25  3:37 PM   Result Value Ref Range    Activated Clotting Time, i-STAT 201 (H) 89 - 137 sec    Specimen Type ARTERIAL    APTT    Collection Time: 05/03/25 11:41 PM   Result Value Ref Range    PTT 58 (H) 23 - 34 seconds   APTT    Collection Time: 05/04/25  6:14 AM   Result Value Ref Range    PTT 64 (H) 23 - 34 seconds

## 2025-05-04 NOTE — ASSESSMENT & PLAN NOTE
Disproportionate enlargement of the third and lateral ventricles relative to size of cortical sulci at the vertex is a nonspecific finding which could relate to asymmetric central white matter volume loss or normal pressure hydrocephalus.    At this time patient does not have any urinary incontinence symptoms as well as ambulatory he says a few months ago he did have some ambulatory dysfunction however this is now resolved  Dysfunction  PT OT  Will get neurology to evaluate in the outpatient setting.

## 2025-05-05 ENCOUNTER — ANESTHESIA (INPATIENT)
Dept: PERIOP | Facility: HOSPITAL | Age: 60
DRG: 182 | End: 2025-05-05
Payer: MEDICARE

## 2025-05-05 ENCOUNTER — ANESTHESIA EVENT (INPATIENT)
Dept: PERIOP | Facility: HOSPITAL | Age: 60
DRG: 182 | End: 2025-05-05
Payer: MEDICARE

## 2025-05-05 LAB
APTT PPP: 71 SECONDS (ref 23–34)
BACTERIA UR QL AUTO: ABNORMAL /HPF
BILIRUB UR QL STRIP: NEGATIVE
CLARITY UR: CLEAR
COLOR UR: COLORLESS
GLUCOSE UR STRIP-MCNC: NEGATIVE MG/DL
HGB UR QL STRIP.AUTO: ABNORMAL
KETONES UR STRIP-MCNC: NEGATIVE MG/DL
LEUKOCYTE ESTERASE UR QL STRIP: NEGATIVE
NITRITE UR QL STRIP: NEGATIVE
NON-SQ EPI CELLS URNS QL MICRO: ABNORMAL /HPF
PH UR STRIP.AUTO: 7 [PH]
PROT UR STRIP-MCNC: NEGATIVE MG/DL
RBC #/AREA URNS AUTO: ABNORMAL /HPF
SP GR UR STRIP.AUTO: 1.01 (ref 1–1.03)
UROBILINOGEN UR STRIP-ACNC: <2 MG/DL
WBC #/AREA URNS AUTO: ABNORMAL /HPF

## 2025-05-05 PROCEDURE — 81001 URINALYSIS AUTO W/SCOPE: CPT | Performed by: INTERNAL MEDICINE

## 2025-05-05 PROCEDURE — 85730 THROMBOPLASTIN TIME PARTIAL: CPT

## 2025-05-05 PROCEDURE — 0CBW0Z1 EXCISION OF UPPER TOOTH, OPEN APPROACH, MULTIPLE: ICD-10-PCS | Performed by: DENTIST

## 2025-05-05 PROCEDURE — 99233 SBSQ HOSP IP/OBS HIGH 50: CPT

## 2025-05-05 PROCEDURE — 97162 PT EVAL MOD COMPLEX 30 MIN: CPT

## 2025-05-05 PROCEDURE — 99232 SBSQ HOSP IP/OBS MODERATE 35: CPT | Performed by: SURGERY

## 2025-05-05 PROCEDURE — 0CDWXZ0 EXTRACTION OF UPPER TOOTH, SINGLE, EXTERNAL APPROACH: ICD-10-PCS | Performed by: DENTIST

## 2025-05-05 PROCEDURE — 97166 OT EVAL MOD COMPLEX 45 MIN: CPT

## 2025-05-05 RX ORDER — ONDANSETRON 2 MG/ML
4 INJECTION INTRAMUSCULAR; INTRAVENOUS ONCE AS NEEDED
Status: DISCONTINUED | OUTPATIENT
Start: 2025-05-05 | End: 2025-05-05 | Stop reason: HOSPADM

## 2025-05-05 RX ORDER — ONDANSETRON 2 MG/ML
INJECTION INTRAMUSCULAR; INTRAVENOUS AS NEEDED
Status: DISCONTINUED | OUTPATIENT
Start: 2025-05-05 | End: 2025-05-05

## 2025-05-05 RX ORDER — SUCCINYLCHOLINE/SOD CL,ISO/PF 100 MG/5ML
SYRINGE (ML) INTRAVENOUS AS NEEDED
Status: DISCONTINUED | OUTPATIENT
Start: 2025-05-05 | End: 2025-05-05

## 2025-05-05 RX ORDER — DEXAMETHASONE SODIUM PHOSPHATE 10 MG/ML
INJECTION, SOLUTION INTRAMUSCULAR; INTRAVENOUS AS NEEDED
Status: DISCONTINUED | OUTPATIENT
Start: 2025-05-05 | End: 2025-05-05

## 2025-05-05 RX ORDER — LABETALOL HYDROCHLORIDE 5 MG/ML
INJECTION, SOLUTION INTRAVENOUS AS NEEDED
Status: DISCONTINUED | OUTPATIENT
Start: 2025-05-05 | End: 2025-05-05

## 2025-05-05 RX ORDER — BUPIVACAINE HYDROCHLORIDE AND EPINEPHRINE 5; 5 MG/ML; UG/ML
INJECTION, SOLUTION EPIDURAL; INTRACAUDAL; PERINEURAL AS NEEDED
Status: DISCONTINUED | OUTPATIENT
Start: 2025-05-05 | End: 2025-05-05 | Stop reason: HOSPADM

## 2025-05-05 RX ORDER — DIPHENHYDRAMINE HYDROCHLORIDE 50 MG/ML
12.5 INJECTION, SOLUTION INTRAMUSCULAR; INTRAVENOUS ONCE AS NEEDED
Status: DISCONTINUED | OUTPATIENT
Start: 2025-05-05 | End: 2025-05-05 | Stop reason: HOSPADM

## 2025-05-05 RX ORDER — SODIUM CHLORIDE 9 MG/ML
100 INJECTION, SOLUTION INTRAVENOUS CONTINUOUS
Status: DISCONTINUED | OUTPATIENT
Start: 2025-05-05 | End: 2025-05-06

## 2025-05-05 RX ORDER — LIDOCAINE HYDROCHLORIDE AND EPINEPHRINE 10; 10 MG/ML; UG/ML
INJECTION, SOLUTION INFILTRATION; PERINEURAL AS NEEDED
Status: DISCONTINUED | OUTPATIENT
Start: 2025-05-05 | End: 2025-05-05 | Stop reason: HOSPADM

## 2025-05-05 RX ORDER — FENTANYL CITRATE 50 UG/ML
INJECTION, SOLUTION INTRAMUSCULAR; INTRAVENOUS AS NEEDED
Status: DISCONTINUED | OUTPATIENT
Start: 2025-05-05 | End: 2025-05-05

## 2025-05-05 RX ORDER — PROPOFOL 10 MG/ML
INJECTION, EMULSION INTRAVENOUS AS NEEDED
Status: DISCONTINUED | OUTPATIENT
Start: 2025-05-05 | End: 2025-05-05

## 2025-05-05 RX ORDER — MIDAZOLAM HYDROCHLORIDE 2 MG/2ML
INJECTION, SOLUTION INTRAMUSCULAR; INTRAVENOUS AS NEEDED
Status: DISCONTINUED | OUTPATIENT
Start: 2025-05-05 | End: 2025-05-05

## 2025-05-05 RX ORDER — LIDOCAINE HYDROCHLORIDE 10 MG/ML
INJECTION, SOLUTION EPIDURAL; INFILTRATION; INTRACAUDAL; PERINEURAL AS NEEDED
Status: DISCONTINUED | OUTPATIENT
Start: 2025-05-05 | End: 2025-05-05

## 2025-05-05 RX ORDER — FENTANYL CITRATE/PF 50 MCG/ML
25 SYRINGE (ML) INJECTION
Refills: 0 | Status: DISCONTINUED | OUTPATIENT
Start: 2025-05-05 | End: 2025-05-05 | Stop reason: HOSPADM

## 2025-05-05 RX ORDER — HYDROMORPHONE HCL/PF 1 MG/ML
0.5 SYRINGE (ML) INJECTION
Refills: 0 | Status: DISCONTINUED | OUTPATIENT
Start: 2025-05-05 | End: 2025-05-05 | Stop reason: HOSPADM

## 2025-05-05 RX ADMIN — LABETALOL HYDROCHLORIDE 10 MG: 5 INJECTION, SOLUTION INTRAVENOUS at 22:45

## 2025-05-05 RX ADMIN — HEPARIN SODIUM 18 UNITS/KG/HR: 10000 INJECTION, SOLUTION INTRAVENOUS at 08:13

## 2025-05-05 RX ADMIN — SODIUM CHLORIDE, SODIUM GLUCONATE, SODIUM ACETATE, POTASSIUM CHLORIDE, MAGNESIUM CHLORIDE, SODIUM PHOSPHATE, DIBASIC, AND POTASSIUM PHOSPHATE 100 ML/HR: .53; .5; .37; .037; .03; .012; .00082 INJECTION, SOLUTION INTRAVENOUS at 08:10

## 2025-05-05 RX ADMIN — LIDOCAINE HYDROCHLORIDE 50 MG: 10 INJECTION, SOLUTION EPIDURAL; INFILTRATION; INTRACAUDAL; PERINEURAL at 20:28

## 2025-05-05 RX ADMIN — ASPIRIN 81 MG: 81 TABLET, COATED ORAL at 08:04

## 2025-05-05 RX ADMIN — AMOXICILLIN AND CLAVULANATE POTASSIUM 1 TABLET: 875; 125 TABLET, COATED ORAL at 22:46

## 2025-05-05 RX ADMIN — METOPROLOL SUCCINATE 50 MG: 50 TABLET, EXTENDED RELEASE ORAL at 08:04

## 2025-05-05 RX ADMIN — LABETALOL HYDROCHLORIDE 5 MG: 5 INJECTION, SOLUTION INTRAVENOUS at 21:12

## 2025-05-05 RX ADMIN — NICOTINE 1 PATCH: 14 PATCH, EXTENDED RELEASE TRANSDERMAL at 08:05

## 2025-05-05 RX ADMIN — HEPARIN SODIUM 18 UNITS/KG/HR: 10000 INJECTION, SOLUTION INTRAVENOUS at 23:59

## 2025-05-05 RX ADMIN — SODIUM CHLORIDE 100 ML/HR: 0.9 INJECTION, SOLUTION INTRAVENOUS at 23:59

## 2025-05-05 RX ADMIN — DEXAMETHASONE SODIUM PHOSPHATE 10 MG: 10 INJECTION, SOLUTION INTRAMUSCULAR; INTRAVENOUS at 20:35

## 2025-05-05 RX ADMIN — ONDANSETRON 4 MG: 2 INJECTION INTRAMUSCULAR; INTRAVENOUS at 20:35

## 2025-05-05 RX ADMIN — PROPOFOL 150 MG: 10 INJECTION, EMULSION INTRAVENOUS at 20:28

## 2025-05-05 RX ADMIN — FENTANYL CITRATE 50 MCG: 50 INJECTION INTRAMUSCULAR; INTRAVENOUS at 20:28

## 2025-05-05 RX ADMIN — SODIUM CHLORIDE 100 ML/HR: 0.9 INJECTION, SOLUTION INTRAVENOUS at 11:57

## 2025-05-05 RX ADMIN — FENTANYL CITRATE 50 MCG: 50 INJECTION INTRAMUSCULAR; INTRAVENOUS at 20:35

## 2025-05-05 RX ADMIN — AMLODIPINE BESYLATE 10 MG: 10 TABLET ORAL at 08:04

## 2025-05-05 RX ADMIN — MIDAZOLAM 2 MG: 1 INJECTION INTRAMUSCULAR; INTRAVENOUS at 20:26

## 2025-05-05 RX ADMIN — ATORVASTATIN CALCIUM 40 MG: 80 TABLET, FILM COATED ORAL at 15:59

## 2025-05-05 RX ADMIN — AMOXICILLIN AND CLAVULANATE POTASSIUM 1 TABLET: 875; 125 TABLET, COATED ORAL at 08:14

## 2025-05-05 RX ADMIN — Medication 100 MG: at 20:28

## 2025-05-05 NOTE — UTILIZATION REVIEW
Initial Clinical Review    Admission: Date/Time/Statement:   Admission Orders (From admission, onward)       Ordered        05/03/25 0245  INPATIENT ADMISSION  Once                          Orders Placed This Encounter   Procedures    INPATIENT ADMISSION     Standing Status:   Standing     Number of Occurrences:   1     Level of Care:   Med Surg [16]     Estimated length of stay:   More than 2 Midnights     Certification:   I certify that inpatient services are medically necessary for this patient for a duration of greater than two midnights. See H&P and MD Progress Notes for additional information about the patient's course of treatment.     ED Arrival Information       Expected   -    Arrival   5/2/2025 23:08    Acuity   Emergent              Means of arrival   Ambulance    Escorted by   [facility]    Service   Hospitalist    Admission type   Emergency              Arrival complaint   right leg pain             Chief Complaint   Patient presents with    Medical Problem     Was walking about an hour ago when he started feeling pins and needles in his R leg along with decreased sensation, no other nuero defecits.        Initial Presentation: 60 y.o. male w/ PMH of HTN, HLD, nicotine dependence, h/o alc abuse presented to the ED from home via EMS w/ sudden onset right leg pain and paresthesias.   Pt reports that he was ambulating this evening when he had a sudden onset of RLE pain w/o rauma/falls described as sharp/severe, 10/10, need for frequent rest to walk home, and development of some paresthesias.   In the ED, noted with cool RLE compared to left and initial inability to obtain right DP/PT pulses. Vascular surgery was alerted and heparin gtt. started, with subsequent labs revealing acute kidney injury. CTA abdominal runoff with concerns for distal occlusions     Admit as Inpatient for evaluation and treatment of critical limb ischemia of TLE, ARF.  Plan: Vascular Surg consulted, plan for  revascularization this admission. cont hep gtt. Continue ASA, atorvastatin started. Continue as needed analgesia. Neurovascular checks.  trial IV fluids overnight and recheck BMP in a.m. Measure PVR/bladder scan, check UA. Continue PTA amlodipine 10 mg daily and metoprolol succinate 50 mg daily with strict hold parameters. Hold Lisinopril. Mon BP.     Anticipated Length of Stay/Certification Statement: Patient will be admitted on an Inpatient basis with an anticipated length of stay of greater than 2 midnights.   Justification for Hospital Stay: Please see detailed plans noted above.     Vasc Surg Consult: Pt with acute onset RLE pain/paresthesias concerning for acute on chronic limb threatening ischemia in setting of ongoing tobacco use.   5/3/25 CTA Abdomen with runoff  -  R SFA mild-mod stenosis 2/2 soft plaque, distal R pop occlusion with R PT reconstitution, abrupt termination in R PT in hindfoot region AT/DP not visualized at ankle.   On eval: motor and sensory intact, palpable femoral pulses, R DP/PT signals present.   Plan: Continue hep gtt, q4hr neurovascular checks. He Continue hep gtt, q4hr neurovascular checks. Noted w/ SHERRELL, would benefit from correction of SHERRELL prior to intervention. Continue home ASA. high intensity statin. PRN pain control    IR Consult: CTA shows right popliteal artery occlusion.   Plan to do right lower extremity angiogram with possible thrombolysis or mechanical thrombectomy.    IR Procedure Note:  Date: 5/3/2025  Procedure: IR LOWER EXTREMITY ANGIOGRAM   Date: 5/3/2025  Anesthesia: conscious sedation   Findings:    Occlusion of the tibioperoneal trunk.   Suction thrombectomy with penumbra CAT 6.   Balloon angioplasty with a 3 mm x 6 cm balloon.   Restored inline flow through a robust posterior tibial artery.   Chronically occluded AT.   Residual thrombus/embolus in the peroneal artery, which we thought should not affect foot perfusion and therefore treatment was deferred.   The  appearance of the aspirated material raises suspicion for an embolic source.   Recommendation:  Embolic workup.  Restart heparin drip at 6 PM 5/3/2025 without bolus.  Left leg straight for 4 hours.      Date: 05/04   Day 2:   Vasc Surg Notes: Pt reports R leg feeling better. S/p RLE angiogram yesterday w/ mechanical thrombectomy and PT runoff; AT/peroneal occluded. Creatinine improving. CT head: enlarged ventricles. TTE: EF: 60%, grade I diastolic; no mention of thrombus. EKG: sinus emory, LA enlargement, ST abnormality.  cont hep gtt. needs CTA chest to complete vascular source evaluation. consider KAREN and outpatient rhythm monitoring. recommend neuro or neurosurg consult for enlarged ventricles, NPH?. cont ASA (home med); cont statin (new med). F/u am labs.    IM Notes: Pt currently has swelling of the right side of face with tenderness of the upper part of his mandible. Noted to have some dental caries at that spot. Creatinine improved.   Plan:  Panorex x-ray. OMFS Consult. Started on Augmentin.  NPO at midnight for possible surgery. Continue PTA amlodipine 10 mg daily and metoprolol succinate 50 mg daily with strict hold parameters. Hold Lisinopril. Labetalol for SBP >160. Mon BP. Cont IVF. Measure PVR/bladder scan, check UA. BMP.    ED Treatment-Medication Administration from 05/02/2025 2308 to 05/03/2025 0459         Date/Time Order Dose Route Action     05/03/2025 0008 heparin (porcine) injection 6,400 Units 6,400 Units Intravenous Given     05/03/2025 0008 heparin (porcine) 25,000 units in 0.45% NaCl 250 mL infusion (premix) 18 Units/kg/hr Intravenous New Bag     05/03/2025 0039 iohexol (OMNIPAQUE) 350 MG/ML injection (MULTI-DOSE) 100 mL 100 mL Intravenous Given     05/03/2025 0316 multi-electrolyte (Plasmalyte-A/Isolyte-S PH 7.4/Normosol-R) IV solution 125 mL/hr Intravenous New Bag            Scheduled Medications:  amLODIPine, 10 mg, Oral, Daily  amoxicillin-clavulanate, 1 tablet, Oral, Q12H LAUREN  aspirin,  81 mg, Oral, Daily  atorvastatin, 40 mg, Oral, Daily With Dinner  metoprolol succinate, 50 mg, Oral, Daily  nicotine, 1 patch, Transdermal, Daily      Continuous IV Infusions:  heparin (porcine), 3-30 Units/kg/hr (Order-Specific), Intravenous, Titrated  multi-electrolyte, 100 mL/hr, Intravenous, Continuous      PRN Meds:  acetaminophen, 650 mg, Oral, Q6H PRN 04/03 x 1, 05/04 x 3  HYDROmorphone, 0.5 mg, Intravenous, Q4H PRN  labetalol, 10 mg, Intravenous, Q4H PRN 05/04 x 1  ondansetron, 4 mg, Intravenous, Q6H PRN  oxyCODONE, 5 mg, Oral, Q6H PRN 05/04 x 2      ED Triage Vitals   Temperature Pulse Respirations Blood Pressure SpO2 Pain Score   05/02/25 2355 05/02/25 2316 05/02/25 2316 05/02/25 2316 05/02/25 2316 05/02/25 2316   98 °F (36.7 °C) 59 17 122/60 99 % 8     Weight (last 2 days)       Date/Time Weight    05/03/25 1115 79.8 (176)    05/03/25 0507 80.2 (176.9)            Vital Signs (last 3 days)       Date/Time Temp Pulse Resp BP MAP (mmHg) SpO2 FiO2 (%) O2 Flow Rate (L/min) O2 Device Patient Position - Orthostatic VS Karley Coma Scale Score Pain    05/05/25 0819 -- -- -- -- -- -- -- -- -- -- 15 No Pain    05/05/25 07:16:47 98.6 °F (37 °C) 72 -- 149/89 109 97 % -- -- -- -- -- --    05/05/25 07:16:09 98.6 °F (37 °C) 75 -- 149/89 109 92 % -- -- -- -- -- --    05/05/25 05:24:26 -- 64 -- 164/90 115 95 % -- -- -- -- -- --    05/05/25 03:42:58 98.4 °F (36.9 °C) 63 16 168/86 113 97 % -- -- None (Room air) Lying -- --    05/04/25 22:35:42 -- 57 -- 172/85 114 98 % -- -- -- -- -- --    05/04/25 22:16:28 98.3 °F (36.8 °C) 70 18 164/85 111 97 % -- -- -- Lying -- --    05/04/25 2100 -- -- -- -- -- -- -- -- -- -- 15 --    05/04/25 2054 -- -- -- -- -- -- -- -- -- -- -- 10 - Worst Possible Pain    05/04/25 19:37:27 98.3 °F (36.8 °C) 70 18 169/87 114 97 % -- -- None (Room air) Lying -- --    05/04/25 1744 -- -- -- -- -- -- -- -- -- -- -- 8    05/04/25 15:09:45 98 °F (36.7 °C) 67 17 163/82 109 100 % -- -- None (Room air) Lying  -- --    05/04/25 1417 -- -- -- -- -- -- -- -- -- -- -- 8    05/04/25 11:18:19 97.5 °F (36.4 °C) 57 -- 146/81 103 97 % -- -- -- -- -- --    05/04/25 1048 -- -- -- -- -- -- -- -- -- -- -- 10 - Worst Possible Pain    05/04/25 0900 -- -- -- -- -- -- -- -- -- -- 15 --    05/04/25 06:27:33 98.3 °F (36.8 °C) 62 20 164/80 108 96 % -- -- -- -- -- --    05/04/25 0609 -- -- -- -- -- -- -- -- -- -- -- 5    05/04/25 02:18:10 98.5 °F (36.9 °C) -- 20 167/79 108 -- -- -- -- -- -- --    05/03/25 2300 -- 60 -- -- -- 94 % -- -- -- -- -- --    05/03/25 22:28:25 98.5 °F (36.9 °C) -- 20 169/78 108 -- -- -- -- -- -- --    05/03/25 2036 -- -- -- -- -- -- -- -- -- -- -- 10 - Worst Possible Pain    05/03/25 2000 -- -- -- -- -- -- -- -- -- -- 15 --    05/03/25 19:12:47 98 °F (36.7 °C) 57 20 151/82 105 99 % -- -- -- -- -- --    05/03/25 1545 -- 53 18 141/68 -- 97 % 21 -- None (Room air) -- -- --    05/03/25 1540 -- 52 18 144/71 -- 100 % 21 -- -- -- -- --    05/03/25 1535 -- -- 18 -- -- -- -- -- None (Room air) -- -- --    05/03/25 1530 -- 51 18 165/78 -- 100 % 21 -- -- -- -- --    05/03/25 1525 -- 52 18 158/74 -- 99 % 21 -- -- -- -- --    05/03/25 1520 -- 52 18 153/74 -- 99 % 21 -- -- -- -- --    05/03/25 1515 -- 54 18 158/75 -- 99 % 21 -- -- -- -- --    05/03/25 1510 -- 53 18 156/77 -- 99 % 21 -- -- -- -- --    05/03/25 1505 -- 53 18 138/73 -- 99 % 21 -- -- -- -- --    05/03/25 1500 -- 54 18 155/79 -- 99 % 21 2 L/min Simple mask -- -- --    05/03/25 1455 -- 54 18 142/79 -- 99 % 21 -- -- -- -- --    05/03/25 1450 -- 53 18 168/86 -- 99 % 21 -- -- -- -- --    05/03/25 1445 -- 59 18 207/97 -- 100 % 21 -- -- -- -- --    05/03/25 1440 -- 54 18 180/88 -- 97 % 21 -- -- -- -- --    05/03/25 1435 -- -- 18 -- -- -- -- -- -- -- -- --    05/03/25 1430 -- 53 18 173/86 -- 99 % 21 -- -- -- -- --    05/03/25 1425 -- 60 18 175/88 -- 100 % 21 -- -- -- -- --    05/03/25 1420 -- 55 18 182/90 -- 98 % 21 -- None (Room air) -- -- --    05/03/25 12:07:23 98.2 °F  (36.8 °C) 61 17 -- -- 95 % -- -- -- -- -- --    05/03/25 1115 -- 56 -- 148/76 -- 98 % -- -- -- -- -- --    05/03/25 0851 -- 57 -- -- -- -- -- -- -- -- -- --    05/03/25 0845 -- -- -- -- -- -- -- -- -- -- 15 --    05/03/25 07:58:15 97.8 °F (36.6 °C) 52 17 148/76 100 97 % -- -- -- -- -- --    05/03/25 05:25:13 97.9 °F (36.6 °C) 59 -- 149/73 98 96 % -- -- -- -- -- --    05/03/25 0400 -- 58 17 159/71 102 97 % -- -- None (Room air) -- -- --    05/03/25 0200 -- 63 16 150/68 98 95 % -- -- None (Room air) -- -- --    05/03/25 0130 -- 59 18 146/81 107 97 % -- -- None (Room air) -- -- --    05/02/25 2355 98 °F (36.7 °C) -- -- -- -- -- -- -- -- -- -- --    05/02/25 2316 -- 59 17 122/60 87 99 % -- -- None (Room air) Lying -- 8              Pertinent Labs/Diagnostic Test Results:   Radiology:  XR mandible panorex ([facility] only)   Final Interpretation by Ubaldo Lambert MD (05/04 7927)      Dental disease as detailed above. Jaw appears intact         Workstation performed: [id number]         CTA chest wo w contrast   Final Interpretation by Renetta Shields MD (05/05 7337)         1. Focal linear area of low-attenuation in the descending thoracic aorta with differential diagnoses including artifact related to mixing of blood, small amount of thrombus or minimal aortic injury. Short interval follow-up with CT chest is recommended    in 3 to 5 days interval.   2. 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   3. Soft tissue density in the anterior mediastinum most likely reflecting thymic hyperplasia. This was described on the prior exam although images are not available for direct comparison.   4. Adrenal hyperplasia.   The study was marked in EPIC for immediate notification.                     Workstation performed: [id number]         IR lower extremity angiogram   Final Interpretation by Nikki Martins MD (05/03 5921)   1.  Successful mechanical thrombectomy of thrombotic occlusion of the  tibioperoneal trunk with restored inline flow to the foot. Recommend work-up to look for an embolic source.      Workstation performed: [id number]         CT head wo contrast   Final Interpretation by Mariano Hernadez MD (05/03 1010)      Diminished attenuation involving white matter described above is a nonspecific finding most often due to chronic small vessel white matter ischemic disease; the degree of which greater than typical for age.      Disproportionate enlargement of the third and lateral ventricles relative to size of cortical sulci at the vertex is a nonspecific finding which could relate to asymmetric central white matter volume loss or normal pressure hydrocephalus.         Mariano Hernadez M.D., FACR   Senior Member, [employer] Society [employer] Ne[employer]ad[employer]         Workstation performed: [id number]         CTA abdominal w run off w wo contrast   Final Interpretation by Jose Luis King MD (05/03 0230)      1.  No abdominal aortic aneurysm or dissection. Scattered calcific atherosclerosis of the abdominal aorta, iliac arteries, and lower extremity arteries as described above.   2.  Very short segment of dissection versus vessel bifurcation involving the distal left superficial femoral artery is seen.   3.  Short segment of mild to moderate luminal narrowing of the distal right superficial femoral artery due to soft plaque is seen.   4.  There is abrupt complete occlusion of the distal right popliteal artery with reconstitution of the right posterior tibial artery more inferiorly. Abrupt termination of the right posterior tibial artery in the hindfoot region consistent with    age-indeterminate occlusion. Nonvisualization of the right tarsal arch. Right anterior tibial artery/dorsalis pedis artery is not visualized in the right ankle region.   5.  No acute intra-abdominal/pelvic abnormalities noted with findings detailed above.      Workstation performed: [id number]           Cardiology:  Echo complete w/ contrast  "if indicated   Final Result by Jona Turk MD (05/03 3079)        Left Ventricle: Left ventricular cavity size is normal. Wall thickness    is mildly increased. The left ventricular ejection fraction is 60%.    Systolic function is normal. Wall motion is normal. Diastolic function is    mildly abnormal, consistent with grade I (abnormal) relaxation.     Left Atrium: The atrium is mildly dilated (35-41 mL/m2).         ECG 12 lead   Final Result by Jona Turk MD (05/04 6092)   Sinus bradycardia   Possible Left atrial enlargement   Marked ST abnormality, possible inferior subendocardial injury   Abnormal ECG   No previous ECGs available   Confirmed by Jona Turk ([id number]) on 5/4/2025 11:42:26 PM        GI:  No orders to display           Results from last 7 days   Lab Units 05/04/25 1044 05/03/25 0633 05/02/25  2337   WBC Thousand/uL 9.49 12.39* 10.54*   HEMOGLOBIN g/dL 15.0 14.7 14.4   HEMATOCRIT % 43.9 43.2 43.1   PLATELETS Thousands/uL 331 312 314   TOTAL NEUT ABS Thousands/µL 5.37 7.48 6.83         Results from last 7 days   Lab Units 05/04/25  1044 05/03/25 0633 05/02/25  2337   SODIUM mmol/L 138 139 142   POTASSIUM mmol/L 3.4* 3.7 3.9   CHLORIDE mmol/L 104 107 105   CO2 mmol/L 27 25 28   ANION GAP mmol/L 7 7 9   BUN mg/dL 16 22 22   CREATININE mg/dL 0.93 1.65* 2.64*   EGFR ml/min/1.73sq m 89 44 25   CALCIUM mg/dL 9.4 9.4 9.7             Results from last 7 days   Lab Units 05/04/25  1044 05/03/25 0633 05/02/25  2337   GLUCOSE RANDOM mg/dL 112 103 101             No results found for: \"BETA-HYDROXYBUTYRATE\"               Results from last 7 days   Lab Units 05/02/25  2337   CK TOTAL U/L 169             Results from last 7 days   Lab Units 05/05/25  0529 05/04/25  1046 05/04/25  0614 05/03/25  0633 05/02/25  2337   PROTIME seconds  --   --   --   --  13.6   INR   --   --   --   --  1.01   PTT seconds 71* 70* 64*   < > 29    < > = values in this interval not displayed.                                          "                                                                      Past Medical History:   Diagnosis Date    Hypertension      Present on Admission:   Critical limb ischemia of right lower extremity (HCC)   Essential hypertension   Hypertriglyceridemia   Tobacco use   Acute renal failure (ARF) (HCC)      Admitting Diagnosis: Ischemia of extremity [I99.8]  Unspecified multiple injuries, initial encounter [T07.XXXA]  Age/Sex: 60 y.o. male    Network Utilization Review Department  ATTENTION: Please call with any questions or concerns to [phone] and carefully listen to the prompts so that you are directed to the right person. All voicemails are confidential.   For Discharge needs, contact Care Management DC Support Team at [phone] opt. 2  Send all requests for admission clinical reviews, approved or denied determinations and any other requests to dedicated fax number below belonging to the campus where the patient is receiving treatment. List of dedicated fax numbers for the Facilities:  FACILITY NAME UR FAX NUMBER   ADMISSION DENIALS (Administrative/Medical Necessity) [phone]   DISCHARGE SUPPORT TEAM (NETWORK) [phone]   PARENT CHILD HEALTH (Maternity/NICU/Pediatrics) [phone]   [facility] [phone]   [facility] [phone]   [facility] [phone]   [facility] [phone]   [facility] [phone]   [facility] [phone]   [facility] [phone]   [facility] [phone]   [facility] [phone]   [facility] [phone]   [facility] [phone]   [facility] [phone]

## 2025-05-05 NOTE — ASSESSMENT & PLAN NOTE
Patient currently has swelling of the right side of face with tenderness of the upper part of his mandible.    Did get a Panorex which shows eeth #2 and 3 in the left maxilla are fractured off at the gumline/absent with retained root structures noted with suggestion of possible mild lucency around the root structures. There also appears to be some minimal lucency around the root structure of   the first tooth and there is a filling in that molar.  There appears to be a cavity near the gumline in tooth #13. The remaining teeth appear grossly within normal limits. 2 of the right maxillary molars and several of the left mandibular teeth are completely absent.    Did consult OMFS  Currently n.p.o.  Plan to go in at 7 or 8 PM tonight  Started on Augmentin 5//2025

## 2025-05-05 NOTE — PHYSICAL THERAPY NOTE
Physical Therapy Evaluation    Patient Name: Roland Campbell    Today's Date: 5/5/2025     Problem List  Principal Problem:    Critical limb ischemia of right lower extremity (HCC)  Active Problems:    Essential hypertension    Hypertriglyceridemia    Tobacco use    Acute renal failure (ARF) (HCC)    Abnormal CT of the head    Dental caries       Past Medical History  Past Medical History:   Diagnosis Date    Hypertension         Past Surgical History  Past Surgical History:   Procedure Laterality Date    IR LOWER EXTREMITY ANGIOGRAM  5/3/2025    FL RPR 1ST INGUN HRNA AGE 5 YRS/> REDUCIBLE Right 5/18/2021    Procedure: REPAIR HERNIA INGUINAL;  Surgeon: Darin Mendoza DO;  Location: [facility];  Service: General           05/05/25 1031   PT Last Visit   PT Visit Date 05/05/25   Note Type   Note type Evaluation   Pain Assessment   Pain Assessment Tool 0-10   Pain Score No Pain   Restrictions/Precautions   Weight Bearing Precautions Per Order No   Other Precautions Pain;Chair Alarm   Home Living   Type of Home Apartment   Home Layout One level;Performs ADLs on one level;Able to live on main level with bedroom/bathroom  (0 ALFRED)   Home Equipment   (denies DME)   Additional Comments Pt reports living in 1 level apartment, 0 ALFRED. Pt was not using AD PTA   Prior Function   Level of [place] Independent with ADLs;Independent with functional mobility;Independent with IADLS   Lives With Other (Comment)  (Roommate/friend)   Receives Help From Friend(s)   IADLs Independent with meal prep;Independent with driving;Independent with medication management   Falls in the last 6 months 1 to 4  (3 falls as a result of RLE weakness/numbness)   Vocational Full time employment   General   Family/Caregiver Present Yes  (Friends present at start of session)   Cognition   Overall Cognitive Status WFL   Arousal/Participation Alert   Orientation Level Oriented X4   Memory Within  functional limits   Following Commands Follows all commands and directions without difficulty   Comments pleasant and cooperative   RLE Assessment   RLE Assessment WFL   LLE Assessment   LLE Assessment WFL   Light Touch   RLE Light Touch Grossly intact   LLE Light Touch Grossly intact   Bed Mobility   Supine to Sit 6  Modified independent   Additional items HOB elevated   Sit to Supine Unable to assess   Additional Comments pt supine in bed on arrival, pt left sitting in chair with call bell and all needs following PT session   Transfers   Sit to Stand 6  Modified independent   Stand to Sit 6  Modified independent   Additional Comments Transfers w/ RW, progresses to no AD   Ambulation/Elevation   Gait pattern Narrow BAYRON;Decreased foot clearance   Gait Assistance 5  Supervision   Additional items Verbal cues   Assistive Device Rolling walker;None   Distance 150 ft w/ RW + 50 ft w/o AD   Ambulation/Elevation Additional Comments Pt initially ambulates w/ RW, progresses to no AD w/ time. Steady throughout, no overt LOB   Balance   Static Sitting Normal   Dynamic Sitting Good   Static Standing Fair +   Dynamic Standing Fair   Ambulatory Fair   Endurance Deficit   Endurance Deficit No   Activity Tolerance   Activity Tolerance Patient tolerated treatment well   Medical Staff Made Aware OT [place]- co eval 2* medical complexity   Nurse Made Aware RN cleared   Assessment   Prognosis Good   Problem List Pain   Assessment Pt seen for moderate (evolving) complexity PT evaluation. Pt with active PT eval/treat and activity as tolerated orders. Pt is a 60 y.o. male who was [facility] with primary dx of Critical limb ischemia of right lower extremity and underwent angiogram on 5/3. Pt  has a past medical history of Hypertension.  PT consulted to assess d/c needs. At baseline, pt resides with friend in 1 level apartment and was IND w/o AD prior to hospital admission. Currently, upon initial examination,  pt  is requiring mod I A for bed mobility skills; mod I for functional transfers and SUP for ambulation with no AD. Pt was left sitting in chair at the end of PT session with all needs in reach. Pt with no questions or concerns regarding d/c home; appears to be functioning at/ near baseline mobility levels. Pt with no further acute inpatient PT needs at this time- please re-consult if needed. PT to DC pt and recommends home w/ no rehab needs. Encourage pt to ambulate at least 3-4x/day with restorative/ nursing staff while remaining in hospital. The patient's AM-PAC Basic Mobility Inpatient Short Form Raw Score is 24, Standardized Score is 57.68. A standardized score greater than 42.9 suggests the patient may benefit from discharge to home. Please also refer to the recommendation of the Physical Therapist for safe discharge planning.   Goals   Patient Goals to go home   Plan   Treatment/Interventions Spoke to case management;Spoke to nursing   PT Frequency Other (Comment)  (PT eval only- DC IPPT)   Discharge Recommendation   Rehab Resource Intensity Level, PT No post-acute rehabilitation needs   AM-PAC Basic Mobility Inpatient   Turning in Flat Bed Without Bedrails 4   Lying on Back to Sitting on Edge of Flat Bed Without Bedrails 4   Moving Bed to Chair 4   Standing Up From Chair Using Arms 4   Walk in Room 4   Climb 3-5 Stairs With Railing 4   Basic Mobility Inpatient Raw Score 24   Basic Mobility Standardized Score 57.68   [facility] Highest Level Of Mobility   -HLM Goal 8: Walk 250 feet or more   -HLM Achieved 8: Walk 250 feet ot more   Modified [place] Scale   Modified [place] Scale 2   End of Consult   Patient Position at End of Consult Bedside chair;All needs within reach;Bed/Chair alarm activated     Jessie Merino PT, DPT

## 2025-05-05 NOTE — ASSESSMENT & PLAN NOTE
"Patient presenting with sudden onset right lower extremity pain developing while walking, did note some paresthesias with ambulation on the way home  During ED evaluation patient noted with cool right foot compared to left, initial inability to doppler right DP/PT pulses and overall concern for critical limb ischemia  CTA abdomen with runoff as below concerning for \"short segment dissection versus occlusion of the distal left SFA, right SFA stenosis secondary to soft plaque with distal right popliteal occlusion with right PT reconstitution, abrupt termination of right PT and hindfoot region AT/DP not visualized ankle\"  Vascular surgery currently on board  Status post right lower extremity angiogram with mechanical thrombectomy on 5/3/2025  CTA was done on 5/4/2025  Focal linear area of low-attenuation in the descending thoracic aorta with differential diagnoses including artifact related to mixing of blood, small amount of thrombus or minimal aortic injury. Short interval follow-up with CT chest is recommended in 3 to 5 days interval.  Discussed with vascular and they said no intervention necessary.  Continue anticoagulation.   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  On heparin gtt. VTE protocol, continue for now  Will keep on heparin drip for now because patient will get KAREN  Price check with home*pharmacy.  KAREN as advised by vascular will consult cards for this  Advised embolic workup according to vascular surgery  Pending KAREN.  Reached out to cardiology who will possibly do it tomorrow  N.p.o. at midnight  Continue ASA, atorvastatin started  Continue as needed analgesia  Neurovascular checks  "

## 2025-05-05 NOTE — PROGRESS NOTES
"Progress Note - Hospitalist   Name: Roland Campbell 60 y.o. male I MRN: [id number]  Unit/Bed#: [facility] 503-01 I Date of Admission: 5/2/2025   Date of Service: 5/5/2025 I Hospital Day: 2     Assessment & Plan  Critical limb ischemia of right lower extremity (HCC)  Patient presenting with sudden onset right lower extremity pain developing while walking, did note some paresthesias with ambulation on the way home  During ED evaluation patient noted with cool right foot compared to left, initial inability to doppler right DP/PT pulses and overall concern for critical limb ischemia  CTA abdomen with runoff as below concerning for \"short segment dissection versus occlusion of the distal left SFA, right SFA stenosis secondary to soft plaque with distal right popliteal occlusion with right PT reconstitution, abrupt termination of right PT and hindfoot region AT/DP not visualized ankle\"  Vascular surgery currently on board  Status post right lower extremity angiogram with mechanical thrombectomy on 5/3/2025  CTA was done on 5/4/2025  Focal linear area of low-attenuation in the descending thoracic aorta with differential diagnoses including artifact related to mixing of blood, small amount of thrombus or minimal aortic injury. Short interval follow-up with CT chest is recommended in 3 to 5 days interval.  Discussed with vascular and they said no intervention necessary.  Continue anticoagulation.   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  On heparin gtt. VTE protocol, continue for now  Will keep on heparin drip for now because patient will get KAREN  Price check with home*pharmacy.  KAREN as advised by vascular will consult cards for this  Advised embolic workup according to vascular surgery  Pending KAREN.  Reached out to cardiology who will possibly do it tomorrow  N.p.o. at midnight  Continue ASA, atorvastatin started  Continue as needed analgesia  Neurovascular checks  Abnormal CT of the head    Disproportionate " enlargement of the third and lateral ventricles relative to size of cortical sulci at the vertex is a nonspecific finding which could relate to asymmetric central white matter volume loss or normal pressure hydrocephalus.    At this time patient does not have any urinary incontinence symptoms as well as ambulatory dysfunction  PT OT  Is not presenting as NPH, will continue to monitor for now.     Dental caries  Patient currently has swelling of the right side of face with tenderness of the upper part of his mandible.    Did get a Panorex which shows eeth #2 and 3 in the left maxilla are fractured off at the gumline/absent with retained root structures noted with suggestion of possible mild lucency around the root structures. There also appears to be some minimal lucency around the root structure of   the first tooth and there is a filling in that molar.  There appears to be a cavity near the gumline in tooth #13. The remaining teeth appear grossly within normal limits. 2 of the right maxillary molars and several of the left mandibular teeth are completely absent.    Did consult OMFS  Currently n.p.o.  Plan to go in at 7 or 8 PM tonight  Started on Augmentin 5//2025    Acute renal failure (ARF) (Formerly [facility])  Lab Results   Component Value Date    CREATININE 0.93 05/04/2025    CREATININE 1.65 (H) 05/03/2025    CREATININE 2.64 (H) 05/02/2025       Lab Results   Component Value Date    EGFR 89 05/04/2025    EGFR 44 05/03/2025    EGFR 25 05/02/2025       On admission, baseline creatinine appears 1  Continue IV fluid  Measure PVR/bladder scan, check UA  Hold nephrotoxins and avoid hypotension  Improved creatinine.  Essential hypertension  Continue PTA amlodipine 10 mg daily and metoprolol succinate 50 mg daily with strict hold parameters.  Holding PTA lisinopril due to need for vascular surgery intervention and acute renal failure  Labetalol for blood pressure greater than 160 systolic    Hypertriglyceridemia  Lab Results    Component Value Date    CHOLESTEROL 156 05/03/2025    LDLCALC 99 05/03/2025    HDL 38 (L) 05/03/2025     Continue atorvastatin 40 mg once daily, aspirin 81 mg once daily    Tobacco use  Counseled the patient on need for complete cessation  Provide nicotine patch while admitted    VTE Pharmacologic Prophylaxis: VTE Score: 4 Moderate Risk (Score 3-4) - Pharmacological DVT Prophylaxis Ordered: heparin drip.    Mobility:   Basic Mobility Inpatient Raw Score: 24  JH-HLM Goal: 8: Walk 250 feet or more  JH-HLM Achieved: 8: Walk 250 feet ot more  JH-HLM Goal NOT achieved. Continue with multidisciplinary rounding and encourage appropriate mobility to improve upon JH-HLM goals.    Patient Centered Rounds: I performed bedside rounds with nursing staff today.   Discussions with Specialists or Other Care Team Provider: None    Education and Discussions with Family / Patient: Patient declined call to .     Current Length of Stay: 2 day(s)  Current Patient Status: Inpatient   Certification Statement: The patient will continue to require additional inpatient hospital stay due to Ischemic limb workup  Discharge Plan: Anticipate discharge in 24-48 hrs to home.    Code Status: Level 1 - Full Code    Subjective   Patient has no complaints at this time.    Objective :  Temp:  [98.3 °F (36.8 °C)-99.8 °F (37.7 °C)] 99.8 °F (37.7 °C)  HR:  [57-75] 67  BP: (149-172)/(85-90) 156/89  Resp:  [14-18] 18  SpO2:  [92 %-98 %] 97 %  O2 Device: None (Room air)    Body mass index is 26.76 kg/m².     Input and Output Summary (last 24 hours):     Intake/Output Summary (Last 24 hours) at 5/5/2025 1544  Last data filed at 5/5/2025 1154  Gross per 24 hour   Intake 2437.03 ml   Output 3450 ml   Net -1012.97 ml       Physical Exam  Vitals and nursing note reviewed.   Constitutional:       Appearance: He is well-developed and normal weight.   HENT:      Head: Normocephalic and atraumatic.      Nose: Nose normal.      Mouth/Throat:      Mouth:  Mucous membranes are moist.   Eyes:      Conjunctiva/sclera: Conjunctivae normal.   Cardiovascular:      Rate and Rhythm: Normal rate and regular rhythm.      Heart sounds: Normal heart sounds. No murmur heard.  Pulmonary:      Effort: Pulmonary effort is normal. No respiratory distress.      Breath sounds: Normal breath sounds.   Abdominal:      General: Abdomen is flat.      Palpations: Abdomen is soft.      Tenderness: There is no abdominal tenderness.   Musculoskeletal:         General: No swelling.      Cervical back: Neck supple.      Right lower leg: No edema.      Left lower leg: No edema.   Skin:     General: Skin is warm and dry.      Capillary Refill: Capillary refill takes less than 2 seconds.   Neurological:      General: No focal deficit present.      Mental Status: He is alert and oriented to person, place, and time. Mental status is at baseline.   Psychiatric:         Mood and Affect: Mood normal.           Lines/Drains:        Telemetry:  Telemetry Orders (From admission, onward)               24 Hour Telemetry Monitoring  Continuous x 24 Hours (Telem)        Expiring   Question:  Reason for 24 Hour Telemetry  Answer:  Arrhythmias requiring acute medical intervention / PPM or ICD malfunction                                  Lab Results: I have reviewed the following results:   Results from last 7 days   Lab Units 05/04/25  1044   WBC Thousand/uL 9.49   HEMOGLOBIN g/dL 15.0   HEMATOCRIT % 43.9   PLATELETS Thousands/uL 331   SEGS PCT % 56   LYMPHO PCT % 29   MONO PCT % 11   EOS PCT % 3     Results from last 7 days   Lab Units 05/04/25  1044   SODIUM mmol/L 138   POTASSIUM mmol/L 3.4*   CHLORIDE mmol/L 104   CO2 mmol/L 27   BUN mg/dL 16   CREATININE mg/dL 0.93   ANION GAP mmol/L 7   CALCIUM mg/dL 9.4   GLUCOSE RANDOM mg/dL 112     Results from last 7 days   Lab Units 05/02/25  2337   INR  1.01                   Recent Cultures (last 7 days):         Last 24 Hours Medication List:     Current  Facility-Administered Medications:     acetaminophen (TYLENOL) tablet 650 mg, Q6H PRN    amLODIPine (NORVASC) tablet 10 mg, Daily    amoxicillin-clavulanate (AUGMENTIN) 875-125 mg per tablet 1 tablet, Q12H LAUREN    aspirin (ECOTRIN LOW STRENGTH) EC tablet 81 mg, Daily    atorvastatin (LIPITOR) tablet 40 mg, Daily With Dinner    heparin (porcine) 25,000 units in 0.45% NaCl 250 mL infusion (premix), Titrated, Last Rate: 18 Units/kg/hr (05/05/25 0813)    HYDROmorphone (DILAUDID) injection 0.5 mg, Q4H PRN    labetalol (NORMODYNE) injection 10 mg, Q4H PRN    metoprolol succinate (TOPROL-XL) 24 hr tablet 50 mg, Daily    nicotine (NICODERM CQ) 14 mg/24hr TD 24 hr patch 1 patch, Daily    ondansetron (ZOFRAN) injection 4 mg, Q6H PRN    oxyCODONE (ROXICODONE) IR tablet 5 mg, Q6H PRN    sodium chloride 0.9 % infusion, Continuous, Last Rate: 100 mL/hr (05/05/25 1157)    Administrative Statements   Today, Patient Was Seen By: Darin Gunderson MD  I have spent a total time of 60 minutes in caring for this patient on the day of the visit/encounter including Diagnostic results, Prognosis, Risks and benefits of tx options, Instructions for management, Patient and family education, Importance of tx compliance, Risk factor reductions, Impressions, Counseling / Coordination of care, Documenting in the medical record, Reviewing/placing orders in the medical record (including tests, medications, and/or procedures), Obtaining or reviewing history  , and Communicating with other healthcare professionals .    **Please Note: This note may have been constructed using a voice recognition system.**

## 2025-05-05 NOTE — OCCUPATIONAL THERAPY NOTE
Occupational Therapy Evaluation     Patient Name: Roland Campbell  Today's Date: 5/5/2025  Problem List  Principal Problem:    Critical limb ischemia of right lower extremity (HCC)  Active Problems:    Essential hypertension    Hypertriglyceridemia    Tobacco use    Acute renal failure (ARF) (HCC)    Abnormal CT of the head    Dental caries    Past Medical History  Past Medical History:   Diagnosis Date    Hypertension      Past Surgical History  Past Surgical History:   Procedure Laterality Date    IR LOWER EXTREMITY ANGIOGRAM  5/3/2025    VT RPR 1ST INGUN HRNA AGE 6 YRS/> REDUCIBLE Right 5/18/2021    Procedure: REPAIR HERNIA INGUINAL;  Surgeon: Darin Mendoza DO;  Location: [facility];  Service: General            05/05/25 1045   OT Last Visit   OT Visit Date 05/05/25   Note Type   Note type Evaluation   Pain Assessment   Pain Assessment Tool 0-10   Pain Score No Pain   Restrictions/Precautions   Weight Bearing Precautions Per Order No   Other Precautions Multiple lines   Home Living   Type of Home Apartment   Home Layout One level   Bathroom Shower/Tub Tub/shower unit   Bathroom Toilet Standard   Bathroom Accessibility Accessible   Prior Function   Level of [place] Independent with ADLs;Independent with functional mobility;Independent with IADLS   Lives With Friend(s)  (roommates)   Receives Help From Family   IADLs Independent with medication management;Independent with meal prep;Independent with driving   Falls in the last 6 months 1 to 4  (3)   Vocational Full time employment   Lifestyle   Autonomy ind w adl/iadl, no ad mob. +   Reciprocal Relationships supportive friends   Service to Others artist   ADL   Where Assessed Edge of bed   Eating Assistance 6  Modified independent   Grooming Assistance 6  Modified Independent   UB Bathing Assistance 6  Modified Independent   LB Bathing Assistance 5  Supervision/Setup   UB Dressing Assistance 6  Modified independent   LB Dressing Assistance 5   Supervision/Setup   Toileting Assistance  5  Supervision/Setup   Functional Assistance 5  Supervision/Setup   Bed Mobility   Supine to Sit 6  Modified independent   Additional Comments found in bed, left in chair w all needs in reach and alarm on.   Transfers   Sit to Stand 6  Modified independent   Stand to Sit 6  Modified independent   Additional Comments no ad   Functional Mobility   Functional Mobility 6  Modified independent   Additional Comments community distance, initially w rw but progressed to no AD   Balance   Static Sitting Normal   Dynamic Sitting Good   Static Standing Fair +   Dynamic Standing Fair   Ambulatory Fair   Activity Tolerance   Activity Tolerance Patient tolerated treatment well   Nurse Made Aware cleared   RUE Assessment   RUE Assessment WFL   LUE Assessment   LUE Assessment WFL   Hand Function   Gross Motor Coordination Functional   Fine Motor Coordination Functional   Cognition   Overall Cognitive Status WFL   Arousal/Participation Responsive;Alert;Cooperative   Attention Within functional limits   Orientation Level Oriented X4   Memory Within functional limits   Following Commands Follows all commands and directions without difficulty   Assessment   Prognosis Good   Assessment Pt is a 60 y.o. male admitted 5/2/25 w critical limb ischemia of RLE. Pt underwent RLE arteriogram, mechanical suction thrombectomy/ plain angioplasty 5/3/25, pod 2 w active OT eval and treat orders. PMH includes  has a past medical history of Hypertension. Pt lives w roommates in an apt. Pta, pt was independent w/ ADL/IADL and functional mobility, was  driving and was not using any DME at baseline. Currently, pt is mod I for ub adl, s for lb adl and completed transfers/fm w mod I w/o ad. From OT standpoint, pt is functioning at baseline level and does not appear to require additional acute OT at this time. Discussed pt's comfort with d/c from OT and any concerns with returning to previous environment; pt does not  report any at this time. The patient's raw score on the AM-PAC Daily Activity inpatient short form is 23, standardized score is 51.12, greater than 39.4. Patients at this level are likely to benefit from discharge to home. Please refer to the recommendation of the Occupational Therapist for safe discharge planning. From OT perspective, pt should D/C HOME when medically stable. Acute OT no longer rq at this time, D/C OT.   Goals   Patient Goals get better   Discharge Recommendation   Rehab Resource Intensity Level, OT No post-acute rehabilitation needs   AM-PAC Daily Activity Inpatient   Lower Body Dressing 3   Bathing 4   Toileting 4   Upper Body Dressing 4   Grooming 4   Eating 4   Daily Activity Raw Score 23   Daily Activity Standardized Score (Calc for Raw Score >=11) 51.12   AM-PAC Applied Cognition Inpatient   Following a Speech/Presentation 4   Understanding Ordinary Conversation 4   Taking Medications 4   Remembering Where Things Are Placed or Put Away 4   Remembering List of 4-5 Errands 4   Taking Care of Complicated Tasks 4   Applied Cognition Raw Score 24   Applied Cognition Standardized Score 62.21   Modified [place] Scale   Modified Shayna Scale 2   End of Consult   Education Provided Yes   Patient Position at End of Consult Bedside chair;All needs within reach;Bed/Chair alarm activated   Nurse Communication Nurse aware of consult       IMANI Devine, OTR/L

## 2025-05-05 NOTE — ASSESSMENT & PLAN NOTE
Lab Results   Component Value Date    CREATININE 0.93 05/04/2025    CREATININE 1.65 (H) 05/03/2025    CREATININE 2.64 (H) 05/02/2025       Lab Results   Component Value Date    EGFR 89 05/04/2025    EGFR 44 05/03/2025    EGFR 25 05/02/2025       On admission, baseline creatinine appears 1  Continue IV fluid  Measure PVR/bladder scan, check UA  Hold nephrotoxins and avoid hypotension  Improved creatinine.

## 2025-05-05 NOTE — DISCHARGE INSTR - AVS FIRST PAGE
DISCHARGE INSTRUCTIONS  ARTERIOGRAM/ANGIOPLASTY/STENT    ACTIVITY: On the evening following the procedure, you should be mostly resting.  Someone should remain with you during the evening and overnight following the procedure.     On the day after your procedure, limit your activity to walking.  Avoid heavy lifting (no more than 15 lbs) for the first three days. Walking up steps and normal activities may be resumed as you feel ready.   You should not drive a car for at least two days following discharge from the hospital. You may ride in a car.   If you have any questions regarding a particular activity, please discuss with your doctor or nurse before you are discharged.    DIET:  Resume your normal diet.  Drink more water than usual for the next 24 hours.    PROCEDURE SITE: You may have a procedure site in your groin, arm, or foot.  You may have surgical glue at your procedure site.  The glue is used to cover the procedure site, assist in closure, and prevent contamination. This adhesive will darken and peel away on its own within one to two weeks. Do not pick at it.    You should shower daily.  Wash incision daily with soap and water, but do not rub or scrub the incision; rinse thoroughly and pat dry.  Do not bathe in a tub or swim for the first 2 week following your procedure or if you have any open wounds.  It is normal to have some bruising, swelling or discoloration around the procedure site.  IF increasing redness, pain, or a bulge develops, call our office immediately.    If present, you may remove the band-aid or “steri-strips” over your procedure site after two days.   If you notice any active bleeding at the site, apply pressure to the site and call our office ([phone]) or [phone].    FOLLOW UP STUDIES:  Your doctor will discuss whether further treatments or follow-up studies are necessary at your first post procedure visit.    FOLLOW UP APPOINTMENTS:  Making and keeping follow up appointments and  ultrasound tests are important to your recovery.  If you have difficulty making it to or keeping your follow up appointments, call the office.    If you have increased pain, fever >101.5, increased drainage, redness or a bad smell at your surgery site, new coldness/numbness of your arm or leg, please call us immediately and GO directly to the ER.    Vale kruse/ Dr. Hui Clemente at the [facility] on 5/28 at 2:30 pm.    PLEASE CALL THE OFFICE IF YOU HAVE ANY QUESTIONS  [phone]  [phone] [address], [place] [address]  [address] [place] [address]  [address] [place] [address]  [address] [facility], [place] [address]  [address] [facility], [address], [place] [address]  [address] [facility], [address], [place] [address]  [address] [facility] [address], 2nd Floor, [place] [address]  [address] [facility], 1st Floor, [address], [place] [address]  [address] [facility], [facility] [place] [address]  [address] [place] [address] [address], [place] [address]    Instructions for you from your physician regarding your new blood thinning medication:    Do not stop taking Eliquis unless instructed by a physician.    Take Eliquis at the same time every day approximately 12 hr apart.    If a skin injury and bleeding occurs, hold pressure on the area with a clean cloth or bandage for approximately 10 min, if bleeding does not stop seek medical attention.    If you see blood in mucus, vomit, urine, stools, or black stools, seek medical attention immediately.    If you injure your head, go to an emergency department for a CAT scan of the brain to check for internal bleeding.    Avoid medications like aspirin, ibuprofen, naproxen to avoid the risk of bleeding stomach ulcers.    Avoid alcohol use to prevent injuries and internal bleeding.    Wear a medical alert bracelet stating that you take an anticoagulant.    Notify your physicians, surgeons, and dentist  that you are now taking a blood thinner prior to any surgeries, procedures, or dental work.

## 2025-05-05 NOTE — ASSESSMENT & PLAN NOTE
Disproportionate enlargement of the third and lateral ventricles relative to size of cortical sulci at the vertex is a nonspecific finding which could relate to asymmetric central white matter volume loss or normal pressure hydrocephalus.    At this time patient does not have any urinary incontinence symptoms as well as ambulatory dysfunction  PT OT  Is not presenting as NPH, will continue to monitor for now.

## 2025-05-05 NOTE — PROGRESS NOTES
"Progress Note - Vascular Surgery   Roland Campbell 60 y.o. male MRN: [id number]  Unit/Bed#: [facility] 503-01 Encounter: [id number]    Assessment:  60yoM with history of tobacco use (0.5 PPD), HTN, HLD, R inguinal hernia repair '21, R hip arthroplasty '22 with c/o acute onset RLE pain found to have acute right pop occlusion on imaging, concerning for acute limb ischemia, R1.     5/3 - RLE arteriogram, mechanical suction thrombectomy and plain angioplasty of TPT     Plan:  - Revascularization w/ in-line single vessel flow into foot via PT    - continue neurovascular checks   - Concern for cardioembolic event    - Continue hep gtt, transition to DOAC after completion of cardioembolic work up    - Echo w/ EF 60%, no valvular vegetations or mural thrombus    - CTA chest with potential small aortic thrombus (versus artifact) however not warranting acute intervention   - Cards consult for possible KAREN or outpatient cardiac monitoring   - Monitor renal function, SHERRELL resolving   - Outpatient neurology for finding of ventricular enlargement on CT head (concern for NPH however asymptomatic currently without urinary incontinence or ambulatory dysfunction after revasc)   - Continue atorvastatin   - Pain control as needed   - Encourage ambulation and IS use   - Remainder of care per primary team    ** Please contact the [facility] Vascular Surgery Resident/AP role for any questions or concerns that might arise     Subjective/Objective    Subjective: No acute events overnight. Doing well this AM, no active complaints, denies paresthesias.      Objective:     Blood pressure 164/90, pulse 64, temperature 98.4 °F (36.9 °C), temperature source Oral, resp. rate 16, height 5' 8\" (1.727 m), weight 79.8 kg (176 lb), SpO2 95%.,Body mass index is 26.76 kg/m².      Intake/Output Summary (Last 24 hours) at 5/5/2025 0621  Last data filed at 5/5/2025 0525  Gross per 24 hour   Intake 1799.92 ml   Output 4050 ml   Net -2250.08 ml       Invasive Devices       " Peripheral Intravenous Line  Duration             Peripheral IV 05/02/25 Left Arm 2 days    Peripheral IV 05/03/25 Right Antecubital 2 days                    Physical Exam:   GEN: NAD  HEENT: NCAT  CV: RRR  Lung: Normal effort  Ab: Soft, NT/ND  Extrem: left groin soft no ecchymosis, b/l LE M/S intact, b/l PT 2+   Neuro: A+Ox3     Lab, Imaging and other studies:I have personally reviewed pertinent lab results.     VTE Pharmacologic Prophylaxis: Heparin  VTE Mechanical Prophylaxis: sequential compression device    Recent Results (from the past 36 hours)   APTT    Collection Time: 05/03/25 11:41 PM   Result Value Ref Range    PTT 58 (H) 23 - 34 seconds   APTT    Collection Time: 05/04/25  6:14 AM   Result Value Ref Range    PTT 64 (H) 23 - 34 seconds   Basic metabolic panel    Collection Time: 05/04/25 10:44 AM   Result Value Ref Range    Sodium 138 135 - 147 mmol/L    Potassium 3.4 (L) 3.5 - 5.3 mmol/L    Chloride 104 96 - 108 mmol/L    CO2 27 21 - 32 mmol/L    ANION GAP 7 4 - 13 mmol/L    BUN 16 5 - 25 mg/dL    Creatinine 0.93 0.60 - 1.30 mg/dL    Glucose 112 65 - 140 mg/dL    Calcium 9.4 8.4 - 10.2 mg/dL    eGFR 89 ml/min/1.73sq m   CBC and differential    Collection Time: 05/04/25 10:44 AM   Result Value Ref Range    WBC 9.49 4.31 - 10.16 Thousand/uL    RBC 4.71 3.88 - 5.62 Million/uL    Hemoglobin 15.0 12.0 - 17.0 g/dL    Hematocrit 43.9 36.5 - 49.3 %    MCV 93 82 - 98 fL    MCH 31.8 26.8 - 34.3 pg    MCHC 34.2 31.4 - 37.4 g/dL    RDW 12.6 11.6 - 15.1 %    MPV 9.6 8.9 - 12.7 fL    Platelets 331 149 - 390 Thousands/uL    nRBC 0 /100 WBCs    Segmented % 56 43 - 75 %    Immature Grans % 0 0 - 2 %    Lymphocytes % 29 14 - 44 %    Monocytes % 11 4 - 12 %    Eosinophils Relative 3 0 - 6 %    Basophils Relative 1 0 - 1 %    Absolute Neutrophils 5.37 1.85 - 7.62 Thousands/µL    Absolute Immature Grans 0.02 0.00 - 0.20 Thousand/uL    Absolute Lymphocytes 2.71 0.60 - 4.47 Thousands/µL    Absolute Monocytes 1.02 0.17 - 1.22  Thousand/µL    Eosinophils Absolute 0.32 0.00 - 0.61 Thousand/µL    Basophils Absolute 0.05 0.00 - 0.10 Thousands/µL   APTT    Collection Time: 05/04/25 10:46 AM   Result Value Ref Range    PTT 70 (H) 23 - 34 seconds   APTT    Collection Time: 05/05/25  5:29 AM   Result Value Ref Range    PTT 71 (H) 23 - 34 seconds

## 2025-05-05 NOTE — UTILIZATION REVIEW
NOTIFICATION OF INPATIENT ADMISSION   AUTHORIZATION REQUEST   SERVICING FACILITY:   [facility]  Address: [address] [place] [address]  Tax ID: [id number]  NPI: [id number] ATTENDING PROVIDER:  Attending Name and NPI#: Darin Gunderson Md [[id number]]  Address: [address] [place] [address]  Phone: [phone]   ADMISSION INFORMATION:  Place of Service: Inpatient [facility]  Place of Service Code: 21  Inpatient Admission Date/Time: 5/3/25  2:45 AM  Discharge Date/Time: No discharge date for patient encounter.  Admitting Diagnosis Code/Description:  Ischemia of extremity [I99.8]  Unspecified multiple injuries, initial encounter [T07.XXXA]     UTILIZATION REVIEW CONTACT:  Ross Weston Utilization   Network Utilization Review Department  Phone: [phone]  Fax: [phone]  Email: [email or web address]  Contact for approvals/pending authorizations, clinical reviews, and discharge.     PHYSICIAN ADVISORY SERVICES:  Medical Necessity Denial & Mthb-ia-Qhug Review  Phone: [phone]  Fax: [phone]  Email: [email or web address]     DISCHARGE SUPPORT TEAM:  For Patients Discharge Needs & Updates  Phone: [phone] opt. 2 Fax: [phone]  Email: [email or web address]

## 2025-05-06 LAB — APTT PPP: 62 SECONDS (ref 23–34)

## 2025-05-06 PROCEDURE — 99232 SBSQ HOSP IP/OBS MODERATE 35: CPT | Performed by: INTERNAL MEDICINE

## 2025-05-06 PROCEDURE — 85730 THROMBOPLASTIN TIME PARTIAL: CPT | Performed by: DENTIST

## 2025-05-06 RX ADMIN — METOPROLOL SUCCINATE 50 MG: 50 TABLET, EXTENDED RELEASE ORAL at 09:48

## 2025-05-06 RX ADMIN — ATORVASTATIN CALCIUM 40 MG: 80 TABLET, FILM COATED ORAL at 15:35

## 2025-05-06 RX ADMIN — AMOXICILLIN AND CLAVULANATE POTASSIUM 1 TABLET: 875; 125 TABLET, COATED ORAL at 09:48

## 2025-05-06 RX ADMIN — LABETALOL HYDROCHLORIDE 10 MG: 5 INJECTION, SOLUTION INTRAVENOUS at 12:07

## 2025-05-06 RX ADMIN — AMOXICILLIN AND CLAVULANATE POTASSIUM 1 TABLET: 875; 125 TABLET, COATED ORAL at 20:10

## 2025-05-06 RX ADMIN — SODIUM CHLORIDE 100 ML/HR: 0.9 INJECTION, SOLUTION INTRAVENOUS at 09:33

## 2025-05-06 RX ADMIN — ASPIRIN 81 MG: 81 TABLET, COATED ORAL at 09:49

## 2025-05-06 RX ADMIN — AMLODIPINE BESYLATE 10 MG: 10 TABLET ORAL at 09:51

## 2025-05-06 RX ADMIN — HEPARIN SODIUM 18 UNITS/KG/HR: 10000 INJECTION, SOLUTION INTRAVENOUS at 17:27

## 2025-05-06 RX ADMIN — NICOTINE 1 PATCH: 14 PATCH, EXTENDED RELEASE TRANSDERMAL at 09:52

## 2025-05-06 NOTE — OP NOTE
OPERATIVE REPORT  PATIENT NAME: Roland Campbell    :  1965  MRN: [id number]  Pt Location:  OR ROOM 07    SURGERY DATE: 2025    Surgeons and Role:     * Dandre St DMD - Primary     * Osiris Kovacs MD - Assisting    Preop Diagnosis:  Dental cavity [K02.9]  Dental caries [K02.9]    Post-Op Diagnosis Codes:     * Dental cavity [K02.9]     * Dental caries [K02.9]    Procedure(s):  Surgical extraction tooth #4  Removal of root tips numbers 14 and 15    Specimen(s):  No specimen    Estimated Blood Loss:   Minimal    Drains:  No drains    Anesthesia Type:   General    Operative Indications:  Dental cavity [K02.9]  Dental caries [K02.9]      Operative Findings:  Nonrestorable tooth #4  Root tips numbers 14 and 15      Complications:   None    Procedure and Technique:  The patient was greeted in the preoperative area. All the risks and benefits of the procedure were once again explained and the risks of sinus communication as well as lower chin and lip numbness were explained in detail all questions were answered. Consent had already been signed. Care was then handed back to the anesthesia team.    The patient was brought into the operating room by the anesthesia team and the patient was placed in a supine position where the patient remained for the rest of the case. Anesthesia was able to establish an orotracheal intubation without any complications. Care was then handed back to the OMFS team.    Patient was draped in sterile manner timeout was performed in which the patient was correctly identified by name medical record number as well as a site of the procedure be performed.     Once a timeout was completed oral cavity was thoroughly suctioned with the Yankauer suction the moist  packing was used it as a throat pack. Patient was given local anesthesia.  The dosages are in the OR record and can be reviewed should this be required.    A periosteal elevator was used to separate the gingiva from the teeth.  Full thickness mucoperiosteal flaps elevated at all extraction sites.  Bone was removed around the teeth.  The teeth were sectioned.  Teeth#4 were then extracted without complication.    Bone removed around root tips numbers 14 and 15.  These root tips removed    Surgical sites were thoroughly curetted, bone filed, and irrigated with sterile saline.  Surgicel placed in extraction sites.  Closure with 3-0 chromic gut sutures. Next the oral cavity was thoroughly irrigated with sterile saline and suctioned with the Tutor Troveuer suction. The moist throat packing was removed and the oropharynx was suctioned.  Sharp and sponge count verified     I was present for the entire procedure.    Patient Disposition:  PACU , hemodynamically stable, and extubated and stable             SIGNATURE: Dandre St DMD  DATE: May 5, 2025  TIME: 8:59 PM

## 2025-05-06 NOTE — ANESTHESIA PREPROCEDURE EVALUATION
Procedure:  INCISION AND DRAINAGE  (I&D) WOUND ORAL (Left: Face)  EXTRACTION TEETH #4, 14, 15 (Bilateral: Mouth)    Relevant Problems   ANESTHESIA (within normal limits)      CARDIO   (+) Critical limb ischemia of right lower extremity (HCC)   (+) Essential hypertension   (+) Hypertriglyceridemia      /RENAL   (+) Acute renal failure (ARF) (HCC)        Physical Exam    Airway    Mallampati score: II  TM Distance: >3 FB  Neck ROM: full     Dental       Cardiovascular  Rate: normal    Pulmonary  Pulmonary exam normal     Other Findings  Per pt denies anything remaining that is loose or removeable        Anesthesia Plan  ASA Score- 3     Anesthesia Type- general with ASA Monitors.         Additional Monitors:     Airway Plan: ETT.           Plan Factors-Exercise tolerance (METS): >4 METS.    Chart reviewed. EKG reviewed.  Existing labs reviewed. Patient summary reviewed.    Patient is a current smoker.              Induction- intravenous.    Postoperative Plan-     Perioperative Resuscitation Plan - Level 1 - Full Code.       Informed Consent- Anesthetic plan and risks discussed with patient.  I personally reviewed this patient with the CRNA. Discussed and agreed on the Anesthesia Plan with the CRNA..      NPO Status:  Vitals Value Taken Time   Date of last liquid 05/04/25 05/05/25 2003   Time of last liquid 1800 05/05/25 2003   Date of last solid 05/04/25 05/05/25 2003   Time of last solid 1800 05/05/25 2003

## 2025-05-06 NOTE — ANESTHESIA POSTPROCEDURE EVALUATION
Post-Op Assessment Note    CV Status:  Stable  Pain Score: 0    Pain management: adequate       Mental Status:  Alert and awake   Hydration Status:  Euvolemic   PONV Controlled:  Controlled   Airway Patency:  Patent     Post Op Vitals Reviewed: Yes    No anethesia notable event occurred.    Staff: CRNA           Last Filed PACU Vitals:  Vitals Value Taken Time   Temp 98.0    Pulse 69 05/05/25 2107   /77    Resp 14 05/05/25 2107   SpO2 99 % 05/05/25 2107   Vitals shown include unfiled device data.

## 2025-05-06 NOTE — ASSESSMENT & PLAN NOTE
"Patient presenting with sudden onset right lower extremity pain developing while walking, did note some paresthesias with ambulation on the way home  During ED evaluation patient noted with cool right foot compared to left, initial inability to doppler right DP/PT pulses and overall concern for critical limb ischemia  CTA abdomen with runoff as below concerning for \"short segment dissection versus occlusion of the distal left SFA, right SFA stenosis secondary to soft plaque with distal right popliteal occlusion with right PT reconstitution, abrupt termination of right PT and hindfoot region AT/DP not visualized ankle\"  Vascular surgery currently on board  Status post right lower extremity angiogram with mechanical thrombectomy on 5/3/2025  CTA was done on 5/4/2025  Focal linear area of low-attenuation in the descending thoracic aorta with differential diagnoses including artifact related to mixing of blood, small amount of thrombus or minimal aortic injury. Short interval follow-up with CT chest is recommended in 3 to 5 days interval.  Discussed with vascular and they said no intervention necessary.  Continue anticoagulation.   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  On heparin gtt. VTE protocol, continue for now  Will keep on heparin drip for now because patient will get KAREN  Eliquis covered by his insurance without a copay  KAREN as advised by vascular will consult cards for this  Advised embolic workup according to vascular surgery  Pending KAREN.  Reached out to cardiology who will possibly do it tomorrow  N.p.o. at midnight  Continue ASA, atorvastatin started  Continue as needed analgesia  Neurovascular checks  "

## 2025-05-06 NOTE — ASSESSMENT & PLAN NOTE
Lab Results   Component Value Date    CREATININE 0.93 05/04/2025    CREATININE 1.65 (H) 05/03/2025    CREATININE 2.64 (H) 05/02/2025       Lab Results   Component Value Date    EGFR 89 05/04/2025    EGFR 44 05/03/2025    EGFR 25 05/02/2025       On admission, baseline creatinine appears 1  Hold nephrotoxins and avoid hypotension  Improved creatinine stop IVF

## 2025-05-06 NOTE — ASSESSMENT & PLAN NOTE
Patient currently has swelling of the right side of face with tenderness of the upper part of his mandible.    Did get a Panorex which shows teeth #2 and 3 in the left maxilla are fractured off at the gumline/absent with retained root structures noted with suggestion of possible mild lucency around the root structures. There also appears to be some minimal lucency around the root structure of   the first tooth and there is a filling in that molar.  There appears to be a cavity near the gumline in tooth #13. The remaining teeth appear grossly within normal limits. 2 of the right maxillary molars and several of the left mandibular teeth are completely absent.    S/p dental extraction by OMS  Continue augmentin

## 2025-05-06 NOTE — PROGRESS NOTES
"Progress Note - OMS   Name: Roland Campbell 60 y.o. male I MRN: [id number]  Unit/Bed#: [facility] 503-01 I Date of Admission: 5/2/2025   Date of Service: 5/6/2025 I Hospital Day: 3     Assessment/Plan: POD 1 s/p extraction of teeth 4, 14, 15. - Doing well.   - No acute OMS intervention, OMS will sign off  - Antibiotics (unasyn 3g IV q6h then transition to augmentin 875-125mg BID PO when able OR clindamycin 600mg IV q8h then transition to clindamycin 600mg PO q8h when able)  - Analgesia as per primary team  - Peridex 15mL swish and spit BID x7d  - Soft, mild diet  - Salt water rinses after meals  - Encourage good oral hygiene  - Head of bed elevated  - Ice to face as needed as need for 24-48 hours then transition to warm compresses as needed  - Sinus precautions: 4 weeks: no nose blowing, avoid putting pressure on sinus area, avoid strenuous activity/straining, try to sneeze with mouth open. Use OTC Afrin BID 2 sprays/nostril 3 days maximum as needed, OTC decongestant (e.g. Sudafed) or Antihistamine (e.g. Claritin-D) as needed, and saline nasal spray as needed.   - 2 weeks: no straws, spitting, smoking.  - OK for DVT ppx  - SCD  - OOB  D/w OMFS attg on call    Dispo: per primary    Please contact ENT Resident role in Epic Secure chat with any questions or concerns.    Subjective: Pt POD 1 s/p tooth extraction as above. Doing well. No acute events overnight.     Objective:   /81   Pulse 67   Temp 98.1 °F (36.7 °C)   Resp 18   Ht 5' 8\" (1.727 m)   Wt 79.8 kg (176 lb)   SpO2 94%   BMI 26.76 kg/m²     Physical Exam   Gen: NAD  Neuro: Grossly intact  Lungs: Breathing easily. No stertor or stridor  CV: Good distal perfusion  Neck: Soft and flat    Wound: Sites of tooth extraction are well-healing    Georgia Mei M.D.  PGY-1  Otolaryngology, Head and Neck Surgery  Please contact ENT Resident role on Epic secure chat with any questions or concerns.      "

## 2025-05-06 NOTE — PERIOPERATIVE NURSING NOTE
Anesthesia messaged regarding blood pressure.  Stated he was ok with bp where is was and he was good to go to the floor.

## 2025-05-06 NOTE — CONSULTS
Oral and Maxillofacial Surgery Consult    Pt seen 05/05/25 8:26 PM     HPI: 60 y.o. male w PMH HTN, HLD, presents to [facility] with leg pain. Consult requested by medicine for dental caries and right facial pain. Pt complains of dental pain the area of right maxilla. Pain 4/10. Pt does not receive regular dental care. Denies fever, chills, nausea, odynophagia, dysphagia, dyspnea, shortness of breath. On heparin drip.    PMH:   Past Medical History:   Diagnosis Date    Hypertension         Allergies:   Allergies   Allergen Reactions    Gabapentin Other (See Comments)     Suicidal Ideations    Pollen Extract Allergic Rhinitis       Meds:     Current Facility-Administered Medications:     [Transfer Hold] acetaminophen (TYLENOL) tablet 650 mg, 650 mg, Oral, Q6H PRN, Ubaldo Srinivasan PA-C, 650 mg at 05/04/25 2054    [Transfer Hold] amLODIPine (NORVASC) tablet 10 mg, 10 mg, Oral, Daily, Aravind Menendez DO, 10 mg at 05/05/25 0804    [Transfer Hold] amoxicillin-clavulanate (AUGMENTIN) 875-125 mg per tablet 1 tablet, 1 tablet, Oral, Q12H LAUREN, Darin Gunderson MD, 1 tablet at 05/05/25 0814    [Transfer Hold] aspirin (ECOTRIN LOW STRENGTH) EC tablet 81 mg, 81 mg, Oral, Daily, Aravind Menendez DO, 81 mg at 05/05/25 0804    [Transfer Hold] atorvastatin (LIPITOR) tablet 40 mg, 40 mg, Oral, Daily With Dinner, Aravind Menendez DO, 40 mg at 05/05/25 1559    heparin (porcine) 25,000 units in 0.45% NaCl 250 mL infusion (premix), 3-30 Units/kg/hr (Order-Specific), Intravenous, Titrated, Aravind Menendez DO, Last Rate: 14.4 mL/hr at 05/05/25 1948, 18 Units/kg/hr at 05/05/25 1948    [Transfer Hold] HYDROmorphone (DILAUDID) injection 0.5 mg, 0.5 mg, Intravenous, Q4H PRN, Darin Gunderson MD    [Transfer Hold] labetalol (NORMODYNE) injection 10 mg, 10 mg, Intravenous, Q4H PRN, Darin Gunderson MD, 10 mg at 05/04/25 2233    [Transfer Hold] metoprolol succinate (TOPROL-XL) 24 hr tablet 50 mg, 50 mg, Oral, Daily, Aravind  DO Shon, 50 mg at 05/05/25 0804    [Transfer Hold] nicotine (NICODERM CQ) 14 mg/24hr TD 24 hr patch 1 patch, 1 patch, Transdermal, Daily, Aravind Menendez DO, 1 patch at 05/05/25 0805    [Transfer Hold] ondansetron (ZOFRAN) injection 4 mg, 4 mg, Intravenous, Q6H PRN, Aravind Menendez DO    [Transfer Hold] oxyCODONE (ROXICODONE) IR tablet 5 mg, 5 mg, Oral, Q6H PRN, Darin Gunderson MD, 5 mg at 05/04/25 1744    sodium chloride 0.9 % infusion, 100 mL/hr, Intravenous, Continuous, Darin Gunderson MD, Last Rate: 100 mL/hr at 05/05/25 1157, 100 mL/hr at 05/05/25 1157    PSH:   Past Surgical History:   Procedure Laterality Date    IR LOWER EXTREMITY ANGIOGRAM  5/3/2025    W[place] RPR 1ST INGUN HRNA AGE 5 YRS/> REDUCIBLE Right 5/18/2021    Procedure: REPAIR HERNIA INGUINAL;  Surgeon: Darin Mendoza DO;  Location: [facility];  Service: General      Family History   Problem Relation Age of Onset    No Known Problems Mother     No Known Problems Father         Review of Systems     Temp:  [98.2 °F (36.8 °C)-99.8 °F (37.7 °C)] 98.2 °F (36.8 °C)  HR:  [57-75] 59  Resp:  [14-18] 18  BP: (149-172)/(85-90) 155/87  SpO2:  [92 %-98 %] 97 %       Intake/Output Summary (Last 24 hours) at 5/5/2025 2026  Last data filed at 5/5/2025 1948  Gross per 24 hour   Intake 1932.62 ml   Output 1950 ml   Net -17.38 ml        Physical Exam:  Gen: AAOx3. NAD.   Resp: Unlabored on RA.  Cards: RRR.   Neuro:  Bilateral CN V2-V3 grossly intact.  Bilateral CN VII grossly intact.  HEENT:   Eye: EOMI w/ no signs of muscle entrapment. PERRL. no subconjunctival hemorrhage. no periorbital ecchymosis/edema. No changes to vision, diplopia, exophthalmos, enophthalmos.   Ears: No blood visualized in the EAC. No changes in hearing.  Nose: no nasal dorsum deviation.  Extraoral:  no facial swelling.   Occlusion intact. no mobility of segments.teeth fragments present #14 and 15. #4 with purulence present. no gingival laceration present. mild TTP along right  maxilla. no vestibular swelling, erythema, purulence, or draining fistula. No trismus. FOM soft, non-elevated, non-tender. no ecchymosis in the FOM. Uvula midline.  Imaging: Results Review Statement: I personally reviewed the following image studies/reports in PACS and discussed pertinent findings with Radiology: XR mandible, CT head. My interpretation of the radiology images/reports is: dental caries.    Assessment  60 y.o. male  evaluated for dental caries and periapical lucency. On bedside dental exam and radiographic findings, teeth fragments #14,15, and #4 with periapical lcuency.    Plan:  - Antibiotics (unasyn 3g IV q6h then transition to augmentin 875-125mg BID PO when able OR clindamycin 600mg IV q8h then transition to clindamycin 600mg PO q8h when able)  - Analgesia as per ED/primary team  - NPO/IVF for OR  - Peridex 15mL swish and spit BID x7d  - Soft, mild diet  - Salt water rinses after meals  - Encourage good oral hygiene  - Head of bed elevated  - Ice to face as needed as need for 24-48 hours then transition to warm compresses as needed  - Sinus precautions: 4 weeks: no nose blowing, avoid putting pressure on sinus area, avoid strenuous activity/straining, try to sneeze with mouth open. Use OTC Afrin BID 2 sprays/nostril 3 days maximum as needed, OTC decongestant (e.g. Sudafed) or Antihistamine (e.g. Claritin-D) as needed, and saline nasal spray as needed.   - 2 weeks: no straws, spitting, smoking.  - Please obtain pre-op labs including CBC, BMP, coags, and COVID test)  - Please complete pre-operative medical assessment, medically optimize pt for treatment, and give any recommendations/modifications for extractions under general anesthesia  - DVT ppx: SCD, OOB; please hold pharmacologic AC for the OR, can start post-op  D/w OMFS attg on call    Inpatient consult to Oral and Maxillofacial Surgery  Consult performed by: Osiris Kovacs MD  Consult ordered by: Darin Gunderson MD            Counseling / Coordination of Care  Total floor / unit time spent today 30 minutes.  Greater than 50% of total time was spent with the patient and / or family counseling and / or coordination of care.

## 2025-05-06 NOTE — PLAN OF CARE
Problem: PAIN - ADULT  Goal: Verbalizes/displays adequate comfort level or baseline comfort level  Description: Interventions:- Encourage patient to monitor pain and request assistance- Assess pain using appropriate pain scale- Administer analgesics based on type and severity of pain and evaluate response- Implement non-pharmacological measures as appropriate and evaluate response- Consider cultural and social influences on pain and pain management- Notify physician/advanced practitioner if interventions unsuccessful or patient reports new pain  Outcome: Progressing     Problem: INFECTION - ADULT  Goal: Absence or prevention of progression during hospitalization  Description: INTERVENTIONS:- Assess and monitor for signs and symptoms of infection- Monitor lab/diagnostic results- Monitor all insertion sites, i.e. indwelling lines, tubes, and drains- Monitor endotracheal if appropriate and nasal secretions for changes in amount and color- [place] appropriate cooling/warming therapies per order- Administer medications as ordered- Instruct and encourage patient and family to use good hand hygiene technique- Identify and instruct in appropriate isolation precautions for identified infection/condition  Outcome: Progressing  Goal: Absence of fever/infection during neutropenic period  Description: INTERVENTIONS:- Monitor WBC  Outcome: Progressing     Problem: DISCHARGE PLANNING  Goal: Discharge to home or other facility with appropriate resources  Description: INTERVENTIONS:- Identify barriers to discharge w/patient and caregiver- Arrange for needed discharge resources and transportation as appropriate- Identify discharge learning needs (meds, wound care, etc.)- Arrange for interpretive services to assist at discharge as needed- Refer to Case Management Department for coordinating discharge planning if the patient needs post-hospital services based on physician/advanced practitioner order or complex needs related to  functional status, cognitive ability, or social support system  Outcome: Progressing     Problem: Knowledge Deficit  Goal: Patient/family/caregiver demonstrates understanding of disease process, treatment plan, medications, and discharge instructions  Description: Complete learning assessment and assess knowledge base.Interventions:- Provide teaching at level of understanding- Provide teaching via preferred learning methods  Outcome: Progressing     Problem: NEUROSENSORY - ADULT  Goal: Achieves stable or improved neurological status  Description: INTERVENTIONS- Monitor and report changes in neurological status- Monitor vital signs such as temperature, blood pressure, glucose, and any other labs ordered - Initiate measures to prevent increased intracranial pressure- Monitor for seizure activity and implement precautions if appropriate    Outcome: Progressing  Goal: Achieves maximal functionality and self care  Description: INTERVENTIONS- Monitor swallowing and airway patency with patient fatigue and changes in neurological status- Encourage and assist patient to increase activity and self care. - Encourage visually impaired, hearing impaired and aphasic patients to use assistive/communication devices  Outcome: Progressing     Problem: CARDIOVASCULAR - ADULT  Goal: Maintains optimal cardiac output and hemodynamic stability  Description: INTERVENTIONS:- Monitor I/O, vital signs and rhythm- Monitor for S/S and trends of decreased cardiac output- Administer and titrate ordered vasoactive medications to optimize hemodynamic stability- Assess quality of pulses, skin color and temperature- Assess for signs of decreased coronary artery perfusion- Instruct patient to report change in severity of symptoms  Outcome: Progressing  Goal: Absence of cardiac dysrhythmias or at baseline rhythm  Description: INTERVENTIONS:- Continuous cardiac monitoring, vital signs, obtain 12 lead EKG if ordered- Administer antiarrhythmic and heart  rate control medications as ordered- Monitor electrolytes and administer replacement therapy as ordered  Outcome: Progressing     Problem: MUSCULOSKELETAL - ADULT  Goal: Maintain or return mobility to safest level of function  Description: INTERVENTIONS:- Assess patient's ability to carry out ADLs; assess patient's baseline for ADL function and identify physical deficits which impact ability to perform ADLs (bathing, care of mouth/teeth, toileting, grooming, dressing, etc.)- Assess/evaluate cause of self-care deficits - Assess range of motion- Assess patient's mobility- Assess patient's need for assistive devices and provide as appropriate- Encourage maximum independence but intervene and supervise when necessary- Involve family in performance of ADLs- Assess for home care needs following discharge - Consider OT consult to assist with ADL evaluation and planning for discharge- Provide patient education as appropriate  Outcome: Progressing  Goal: Maintain proper alignment of affected body part  Description: INTERVENTIONS:- Support, maintain and protect limb and body alignment- Provide patient/ family with appropriate education  Outcome: Progressing

## 2025-05-06 NOTE — PLAN OF CARE
Problem: PAIN - ADULT  Goal: Verbalizes/displays adequate comfort level or baseline comfort level  Description: Interventions:- Encourage patient to monitor pain and request assistance- Assess pain using appropriate pain scale- Administer analgesics based on type and severity of pain and evaluate response- Implement non-pharmacological measures as appropriate and evaluate response- Consider cultural and social influences on pain and pain management- Notify physician/advanced practitioner if interventions unsuccessful or patient reports new pain  Outcome: Progressing     Problem: INFECTION - ADULT  Goal: Absence or prevention of progression during hospitalization  Description: INTERVENTIONS:- Assess and monitor for signs and symptoms of infection- Monitor lab/diagnostic results- Monitor all insertion sites, i.e. indwelling lines, tubes, and drains- Monitor endotracheal if appropriate and nasal secretions for changes in amount and color- [place] appropriate cooling/warming therapies per order- Administer medications as ordered- Instruct and encourage patient and family to use good hand hygiene technique- Identify and instruct in appropriate isolation precautions for identified infection/condition  Outcome: Progressing  Goal: Absence of fever/infection during neutropenic period  Description: INTERVENTIONS:- Monitor WBC  Outcome: Progressing     Problem: DISCHARGE PLANNING  Goal: Discharge to home or other facility with appropriate resources  Description: INTERVENTIONS:- Identify barriers to discharge w/patient and caregiver- Arrange for needed discharge resources and transportation as appropriate- Identify discharge learning needs (meds, wound care, etc.)- Arrange for interpretive services to assist at discharge as needed- Refer to Case Management Department for coordinating discharge planning if the patient needs post-hospital services based on physician/advanced practitioner order or complex needs related to  functional status, cognitive ability, or social support system  Outcome: Progressing     Problem: Knowledge Deficit  Goal: Patient/family/caregiver demonstrates understanding of disease process, treatment plan, medications, and discharge instructions  Description: Complete learning assessment and assess knowledge base.Interventions:- Provide teaching at level of understanding- Provide teaching via preferred learning methods  Outcome: Progressing     Problem: NEUROSENSORY - ADULT  Goal: Achieves stable or improved neurological status  Description: INTERVENTIONS- Monitor and report changes in neurological status- Monitor vital signs such as temperature, blood pressure, glucose, and any other labs ordered - Initiate measures to prevent increased intracranial pressure- Monitor for seizure activity and implement precautions if appropriate    Outcome: Progressing  Goal: Achieves maximal functionality and self care  Description: INTERVENTIONS- Monitor swallowing and airway patency with patient fatigue and changes in neurological status- Encourage and assist patient to increase activity and self care. - Encourage visually impaired, hearing impaired and aphasic patients to use assistive/communication devices  Outcome: Progressing     Problem: CARDIOVASCULAR - ADULT  Goal: Maintains optimal cardiac output and hemodynamic stability  Description: INTERVENTIONS:- Monitor I/O, vital signs and rhythm- Monitor for S/S and trends of decreased cardiac output- Administer and titrate ordered vasoactive medications to optimize hemodynamic stability- Assess quality of pulses, skin color and temperature- Assess for signs of decreased coronary artery perfusion- Instruct patient to report change in severity of symptoms  Outcome: Progressing  Goal: Absence of cardiac dysrhythmias or at baseline rhythm  Description: INTERVENTIONS:- Continuous cardiac monitoring, vital signs, obtain 12 lead EKG if ordered- Administer antiarrhythmic and heart  rate control medications as ordered- Monitor electrolytes and administer replacement therapy as ordered  Outcome: Progressing     Problem: MUSCULOSKELETAL - ADULT  Goal: Maintain or return mobility to safest level of function  Description: INTERVENTIONS:- Assess patient's ability to carry out ADLs; assess patient's baseline for ADL function and identify physical deficits which impact ability to perform ADLs (bathing, care of mouth/teeth, toileting, grooming, dressing, etc.)- Assess/evaluate cause of self-care deficits - Assess range of motion- Assess patient's mobility- Assess patient's need for assistive devices and provide as appropriate- Encourage maximum independence but intervene and supervise when necessary- Involve family in performance of ADLs- Assess for home care needs following discharge - Consider OT consult to assist with ADL evaluation and planning for discharge- Provide patient education as appropriate  Outcome: Progressing  Goal: Maintain proper alignment of affected body part  Description: INTERVENTIONS:- Support, maintain and protect limb and body alignment- Provide patient/ family with appropriate education  Outcome: Progressing

## 2025-05-06 NOTE — PROGRESS NOTES
"Progress Note - Hospitalist   Name: Roland Campbell 60 y.o. male I MRN: [id number]  Unit/Bed#: [facility] 503-01 I Date of Admission: 5/2/2025   Date of Service: 5/6/2025 I Hospital Day: 3    Assessment & Plan  Critical limb ischemia of right lower extremity (HCC)  Patient presenting with sudden onset right lower extremity pain developing while walking, did note some paresthesias with ambulation on the way home  During ED evaluation patient noted with cool right foot compared to left, initial inability to doppler right DP/PT pulses and overall concern for critical limb ischemia  CTA abdomen with runoff as below concerning for \"short segment dissection versus occlusion of the distal left SFA, right SFA stenosis secondary to soft plaque with distal right popliteal occlusion with right PT reconstitution, abrupt termination of right PT and hindfoot region AT/DP not visualized ankle\"  Vascular surgery currently on board  Status post right lower extremity angiogram with mechanical thrombectomy on 5/3/2025  CTA was done on 5/4/2025  Focal linear area of low-attenuation in the descending thoracic aorta with differential diagnoses including artifact related to mixing of blood, small amount of thrombus or minimal aortic injury. Short interval follow-up with CT chest is recommended in 3 to 5 days interval.  Discussed with vascular and they said no intervention necessary.  Continue anticoagulation.   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  On heparin gtt. VTE protocol, continue for now  Will keep on heparin drip for now because patient will get KAREN  Eliquis covered by his insurance without a copay  KAREN as advised by vascular will consult cards for this  Advised embolic workup according to vascular surgery  Pending KAREN.  Reached out to cardiology who will possibly do it tomorrow  N.p.o. at midnight  Continue ASA, atorvastatin started  Continue as needed analgesia  Neurovascular checks  Abnormal CT of the " head  Disproportionate enlargement of the third and lateral ventricles relative to size of cortical sulci at the vertex is a nonspecific finding which could relate to asymmetric central white matter volume loss or normal pressure hydrocephalus.    At this time patient does not have any urinary incontinence symptoms as well as ambulatory dysfunction  PT OT  Is not presenting as NPH, will continue to monitor for now.     Dental caries  Patient currently has swelling of the right side of face with tenderness of the upper part of his mandible.    Did get a Panorex which shows teeth #2 and 3 in the left maxilla are fractured off at the gumline/absent with retained root structures noted with suggestion of possible mild lucency around the root structures. There also appears to be some minimal lucency around the root structure of   the first tooth and there is a filling in that molar.  There appears to be a cavity near the gumline in tooth #13. The remaining teeth appear grossly within normal limits. 2 of the right maxillary molars and several of the left mandibular teeth are completely absent.    S/p dental extraction by [facility]  Continue augmentin    Acute renal failure (ARF) ([facility])  Lab Results   Component Value Date    CREATININE 0.93 05/04/2025    CREATININE 1.65 (H) 05/03/2025    CREATININE 2.64 (H) 05/02/2025       Lab Results   Component Value Date    EGFR 89 05/04/2025    EGFR 44 05/03/2025    EGFR 25 05/02/2025       On admission, baseline creatinine appears 1  Hold nephrotoxins and avoid hypotension  Improved creatinine stop IVF  Essential hypertension  Continue PTA amlodipine 10 mg daily and metoprolol succinate 50 mg daily with strict hold parameters.  Holding PTA lisinopril due to need for vascular surgery intervention and acute renal failure  Labetalol for blood pressure greater than 160 systolic  Hypertriglyceridemia  Lab Results   Component Value Date    CHOLESTEROL 156 05/03/2025    LDLCALC 99 05/03/2025    HDL  38 (L) 05/03/2025     Continue atorvastatin 40 mg once daily, aspirin 81 mg once daily    Tobacco use  Counseled the patient on need for complete cessation  Provide nicotine patch while admitted    VTE Pharmacologic Prophylaxis: VTE Score: 4 Moderate Risk (Score 3-4) - Pharmacological DVT Prophylaxis Ordered: heparin drip.    Mobility:   Basic Mobility Inpatient Raw Score: 24  JH-HLM Goal: 8: Walk 250 feet or more  JH-HLM Achieved: 6: Walk 10 steps or more  JH-HLM Goal NOT achieved. Continue with multidisciplinary rounding and encourage appropriate mobility to improve upon JH-HLM goals.    Patient Centered Rounds: I performed bedside rounds with nursing staff today.   Discussions with Specialists or Other Care Team Provider: nurseJENNIFER    Current Length of Stay: 3 day(s)  Current Patient Status: Inpatient   Certification Statement: The patient will continue to require additional inpatient hospital stay due to awaiting Landon, heparin drip  Discharge Plan: Anticipate discharge in 48-72 hrs to home.    Code Status: Level 1 - Full Code    Subjective   Reports that his leg is feeling better.    Objective :  Temp:  [97.5 °F (36.4 °C)-99.8 °F (37.7 °C)] 98.1 °F (36.7 °C)  HR:  [59-96] 77  BP: (126-195)/(74-95) 177/74  Resp:  [12-20] 20  SpO2:  [91 %-97 %] 97 %  O2 Device: None (Room air)  Nasal Cannula O2 Flow Rate (L/min):  [3 L/min] 3 L/min    Body mass index is 26.76 kg/m².     Input and Output Summary (last 24 hours):     Intake/Output Summary (Last 24 hours) at 5/6/2025 1426  Last data filed at 5/6/2025 0718  Gross per 24 hour   Intake 2058.4 ml   Output 525 ml   Net 1533.4 ml       Physical Exam  Constitutional:       Appearance: Normal appearance.   HENT:      Head: Normocephalic.      Nose: Nose normal.   Eyes:      Extraocular Movements: Extraocular movements intact.   Cardiovascular:      Rate and Rhythm: Normal rate and regular rhythm.   Pulmonary:      Effort: Pulmonary effort is normal.      Breath sounds: No  wheezing or rales.   Musculoskeletal:      Right lower leg: No edema.      Left lower leg: No edema.   Skin:     General: Skin is warm and dry.   Neurological:      Mental Status: He is alert and oriented to person, place, and time.   Psychiatric:         Mood and Affect: Mood normal.         Behavior: Behavior normal.           Lines/Drains:        Telemetry:  Telemetry Orders (From admission, onward)               24 Hour Telemetry Monitoring  Continuous x 24 Hours (Telem)        Expiring   Question:  Reason for 24 Hour Telemetry  Answer:  Arrhythmias requiring acute medical intervention / PPM or ICD malfunction                     Telemetry Reviewed: Sinus Bradycardia  Indication for Continued Telemetry Use: Arrthymias requiring medical therapy               Lab Results: I have reviewed the following results:   Results from last 7 days   Lab Units 05/04/25  1044   WBC Thousand/uL 9.49   HEMOGLOBIN g/dL 15.0   HEMATOCRIT % 43.9   PLATELETS Thousands/uL 331   SEGS PCT % 56   LYMPHO PCT % 29   MONO PCT % 11   EOS PCT % 3     Results from last 7 days   Lab Units 05/04/25  1044   SODIUM mmol/L 138   POTASSIUM mmol/L 3.4*   CHLORIDE mmol/L 104   CO2 mmol/L 27   BUN mg/dL 16   CREATININE mg/dL 0.93   ANION GAP mmol/L 7   CALCIUM mg/dL 9.4   GLUCOSE RANDOM mg/dL 112     Results from last 7 days   Lab Units 05/02/25  2337   INR  1.01                   Recent Cultures (last 7 days):         Imaging Results Review: No pertinent imaging studies reviewed.  Other Study Results Review: No additional pertinent studies reviewed.    Last 24 Hours Medication List:     Current Facility-Administered Medications:     acetaminophen (TYLENOL) tablet 650 mg, Q6H PRN    amLODIPine (NORVASC) tablet 10 mg, Daily    amoxicillin-clavulanate (AUGMENTIN) 875-125 mg per tablet 1 tablet, Q12H LAUREN    aspirin (ECOTRIN LOW STRENGTH) EC tablet 81 mg, Daily    atorvastatin (LIPITOR) tablet 40 mg, Daily With Dinner    heparin (porcine) 25,000 units in  0.45% NaCl 250 mL infusion (premix), Titrated, Last Rate: 18 Units/kg/hr (05/06/25 0657)    HYDROmorphone (DILAUDID) injection 0.5 mg, Q4H PRN    labetalol (NORMODYNE) injection 10 mg, Q4H PRN    metoprolol succinate (TOPROL-XL) 24 hr tablet 50 mg, Daily    nicotine (NICODERM CQ) 14 mg/24hr TD 24 hr patch 1 patch, Daily    ondansetron (ZOFRAN) injection 4 mg, Q6H PRN    oxyCODONE (ROXICODONE) IR tablet 5 mg, Q6H PRN    Administrative Statements   Today, Patient Was Seen By: Fermin Pike MD  I have spent a total time of 40 minutes in caring for this patient on the day of the visit/encounter including Diagnostic results, Instructions for management, Patient and family education, Impressions, Counseling / Coordination of care, Documenting in the medical record, Reviewing/placing orders in the medical record (including tests, medications, and/or procedures), Obtaining or reviewing history  , and Communicating with other healthcare professionals .    **Please Note: This note may have been constructed using a voice recognition system.**

## 2025-05-07 ENCOUNTER — ANESTHESIA (INPATIENT)
Dept: NON INVASIVE DIAGNOSTICS | Facility: HOSPITAL | Age: 60
DRG: 182 | End: 2025-05-07
Payer: MEDICARE

## 2025-05-07 LAB
ANION GAP SERPL CALCULATED.3IONS-SCNC: 11 MMOL/L (ref 4–13)
APTT PPP: 54 SECONDS (ref 23–34)
APTT PPP: 59 SECONDS (ref 23–34)
APTT PPP: 68 SECONDS (ref 23–34)
BASOPHILS # BLD AUTO: 0.05 THOUSANDS/ÂΜL (ref 0–0.1)
BASOPHILS NFR BLD AUTO: 0 % (ref 0–1)
BUN SERPL-MCNC: 13 MG/DL (ref 5–25)
CALCIUM SERPL-MCNC: 9.5 MG/DL (ref 8.4–10.2)
CHLORIDE SERPL-SCNC: 104 MMOL/L (ref 96–108)
CO2 SERPL-SCNC: 24 MMOL/L (ref 21–32)
CREAT SERPL-MCNC: 0.78 MG/DL (ref 0.6–1.3)
EOSINOPHIL # BLD AUTO: 0.11 THOUSAND/ÂΜL (ref 0–0.61)
EOSINOPHIL NFR BLD AUTO: 1 % (ref 0–6)
ERYTHROCYTE [DISTWIDTH] IN BLOOD BY AUTOMATED COUNT: 12.8 % (ref 11.6–15.1)
GFR SERPL CREATININE-BSD FRML MDRD: 98 ML/MIN/1.73SQ M
GLUCOSE SERPL-MCNC: 132 MG/DL (ref 65–140)
HCT VFR BLD AUTO: 41.9 % (ref 36.5–49.3)
HGB BLD-MCNC: 14.4 G/DL (ref 12–17)
IMM GRANULOCYTES # BLD AUTO: 0.08 THOUSAND/UL (ref 0–0.2)
IMM GRANULOCYTES NFR BLD AUTO: 0 % (ref 0–2)
LYMPHOCYTES # BLD AUTO: 2.71 THOUSANDS/ÂΜL (ref 0.6–4.47)
LYMPHOCYTES NFR BLD AUTO: 14 % (ref 14–44)
MCH RBC QN AUTO: 31.7 PG (ref 26.8–34.3)
MCHC RBC AUTO-ENTMCNC: 34.4 G/DL (ref 31.4–37.4)
MCV RBC AUTO: 92 FL (ref 82–98)
MONOCYTES # BLD AUTO: 1.43 THOUSAND/ÂΜL (ref 0.17–1.22)
MONOCYTES NFR BLD AUTO: 7 % (ref 4–12)
NEUTROPHILS # BLD AUTO: 15.26 THOUSANDS/ÂΜL (ref 1.85–7.62)
NEUTS SEG NFR BLD AUTO: 78 % (ref 43–75)
NRBC BLD AUTO-RTO: 0 /100 WBCS
PLATELET # BLD AUTO: 316 THOUSANDS/UL (ref 149–390)
PMV BLD AUTO: 9.7 FL (ref 8.9–12.7)
POTASSIUM SERPL-SCNC: 3.4 MMOL/L (ref 3.5–5.3)
RBC # BLD AUTO: 4.54 MILLION/UL (ref 3.88–5.62)
SL CV LV EF: 60
SODIUM SERPL-SCNC: 139 MMOL/L (ref 135–147)
WBC # BLD AUTO: 19.64 THOUSAND/UL (ref 4.31–10.16)

## 2025-05-07 PROCEDURE — 85730 THROMBOPLASTIN TIME PARTIAL: CPT | Performed by: INTERNAL MEDICINE

## 2025-05-07 PROCEDURE — 99232 SBSQ HOSP IP/OBS MODERATE 35: CPT | Performed by: INTERNAL MEDICINE

## 2025-05-07 PROCEDURE — 93312 ECHO TRANSESOPHAGEAL: CPT

## 2025-05-07 PROCEDURE — 85025 COMPLETE CBC W/AUTO DIFF WBC: CPT | Performed by: INTERNAL MEDICINE

## 2025-05-07 PROCEDURE — 93325 DOPPLER ECHO COLOR FLOW MAPG: CPT | Performed by: INTERNAL MEDICINE

## 2025-05-07 PROCEDURE — 93320 DOPPLER ECHO COMPLETE: CPT | Performed by: INTERNAL MEDICINE

## 2025-05-07 PROCEDURE — 80048 BASIC METABOLIC PNL TOTAL CA: CPT | Performed by: INTERNAL MEDICINE

## 2025-05-07 PROCEDURE — 93312 ECHO TRANSESOPHAGEAL: CPT | Performed by: INTERNAL MEDICINE

## 2025-05-07 RX ORDER — LIDOCAINE HYDROCHLORIDE 10 MG/ML
INJECTION, SOLUTION EPIDURAL; INFILTRATION; INTRACAUDAL; PERINEURAL AS NEEDED
Status: DISCONTINUED | OUTPATIENT
Start: 2025-05-07 | End: 2025-05-07

## 2025-05-07 RX ORDER — PROPOFOL 10 MG/ML
INJECTION, EMULSION INTRAVENOUS CONTINUOUS PRN
Status: DISCONTINUED | OUTPATIENT
Start: 2025-05-07 | End: 2025-05-07

## 2025-05-07 RX ORDER — METOPROLOL SUCCINATE 25 MG/1
25 TABLET, EXTENDED RELEASE ORAL DAILY
Status: DISCONTINUED | OUTPATIENT
Start: 2025-05-08 | End: 2025-05-08

## 2025-05-07 RX ORDER — SODIUM CHLORIDE 9 MG/ML
INJECTION, SOLUTION INTRAVENOUS CONTINUOUS PRN
Status: DISCONTINUED | OUTPATIENT
Start: 2025-05-07 | End: 2025-05-07

## 2025-05-07 RX ORDER — PROPOFOL 10 MG/ML
INJECTION, EMULSION INTRAVENOUS AS NEEDED
Status: DISCONTINUED | OUTPATIENT
Start: 2025-05-07 | End: 2025-05-07

## 2025-05-07 RX ORDER — POTASSIUM CHLORIDE 1500 MG/1
20 TABLET, EXTENDED RELEASE ORAL
Status: DISCONTINUED | OUTPATIENT
Start: 2025-05-07 | End: 2025-05-08 | Stop reason: HOSPADM

## 2025-05-07 RX ADMIN — POTASSIUM CHLORIDE 20 MEQ: 1500 TABLET, EXTENDED RELEASE ORAL at 08:33

## 2025-05-07 RX ADMIN — NICOTINE 1 PATCH: 14 PATCH, EXTENDED RELEASE TRANSDERMAL at 08:33

## 2025-05-07 RX ADMIN — AMOXICILLIN AND CLAVULANATE POTASSIUM 1 TABLET: 875; 125 TABLET, COATED ORAL at 20:49

## 2025-05-07 RX ADMIN — HEPARIN SODIUM 20 UNITS/KG/HR: 10000 INJECTION, SOLUTION INTRAVENOUS at 09:54

## 2025-05-07 RX ADMIN — AMLODIPINE BESYLATE 10 MG: 10 TABLET ORAL at 08:33

## 2025-05-07 RX ADMIN — POTASSIUM CHLORIDE 20 MEQ: 1500 TABLET, EXTENDED RELEASE ORAL at 16:20

## 2025-05-07 RX ADMIN — AMOXICILLIN AND CLAVULANATE POTASSIUM 1 TABLET: 875; 125 TABLET, COATED ORAL at 08:33

## 2025-05-07 RX ADMIN — METOPROLOL SUCCINATE 50 MG: 50 TABLET, EXTENDED RELEASE ORAL at 08:33

## 2025-05-07 RX ADMIN — ATORVASTATIN CALCIUM 40 MG: 80 TABLET, FILM COATED ORAL at 16:21

## 2025-05-07 RX ADMIN — SODIUM CHLORIDE: 0.9 INJECTION, SOLUTION INTRAVENOUS at 14:35

## 2025-05-07 RX ADMIN — ASPIRIN 81 MG: 81 TABLET, COATED ORAL at 08:33

## 2025-05-07 RX ADMIN — PROPOFOL 150 MG: 10 INJECTION, EMULSION INTRAVENOUS at 14:51

## 2025-05-07 RX ADMIN — PROPOFOL 120 MCG/KG/MIN: 10 INJECTION, EMULSION INTRAVENOUS at 14:53

## 2025-05-07 RX ADMIN — APIXABAN 10 MG: 5 TABLET, FILM COATED ORAL at 17:15

## 2025-05-07 RX ADMIN — LIDOCAINE HYDROCHLORIDE 100 MG: 10 INJECTION, SOLUTION EPIDURAL; INFILTRATION; INTRACAUDAL; PERINEURAL at 14:51

## 2025-05-07 NOTE — ANESTHESIA POSTPROCEDURE EVALUATION
Post-Op Assessment Note    CV Status:  Stable    Pain management: adequate       Mental Status:  Awake   Hydration Status:  Stable   PONV Controlled:  Controlled   Airway Patency:  Patent     Post Op Vitals Reviewed: Yes    No anethesia notable event occurred.    Staff: Anesthesiologist, CRNA           Last Filed PACU Vitals:  Vitals Value Taken Time   Temp     Pulse 58    /64    Resp 18    SpO2 98

## 2025-05-07 NOTE — ANESTHESIA POSTPROCEDURE EVALUATION
Post-Op Assessment Note    CV Status:  Stable    Pain management: adequate       Mental Status:  Alert and awake   Hydration Status:  Euvolemic   PONV Controlled:  Controlled   Airway Patency:  Patent     Post Op Vitals Reviewed: Yes    No anethesia notable event occurred.            Last Filed PACU Vitals:  Vitals Value Taken Time   Temp     Pulse 58 05/07/25 1612   BP     Resp     SpO2 97 % 05/07/25 1612   Vitals shown include unfiled device data.

## 2025-05-07 NOTE — PLAN OF CARE
Problem: PAIN - ADULT  Goal: Verbalizes/displays adequate comfort level or baseline comfort level  Description: Interventions:- Encourage patient to monitor pain and request assistance- Assess pain using appropriate pain scale- Administer analgesics based on type and severity of pain and evaluate response- Implement non-pharmacological measures as appropriate and evaluate response- Consider cultural and social influences on pain and pain management- Notify physician/advanced practitioner if interventions unsuccessful or patient reports new pain  Outcome: Progressing     Problem: INFECTION - ADULT  Goal: Absence or prevention of progression during hospitalization  Description: INTERVENTIONS:- Assess and monitor for signs and symptoms of infection- Monitor lab/diagnostic results- Monitor all insertion sites, i.e. indwelling lines, tubes, and drains- Monitor endotracheal if appropriate and nasal secretions for changes in amount and color- [place] appropriate cooling/warming therapies per order- Administer medications as ordered- Instruct and encourage patient and family to use good hand hygiene technique- Identify and instruct in appropriate isolation precautions for identified infection/condition  Outcome: Progressing  Goal: Absence of fever/infection during neutropenic period  Description: INTERVENTIONS:- Monitor WBC  Outcome: Progressing     Problem: DISCHARGE PLANNING  Goal: Discharge to home or other facility with appropriate resources  Description: INTERVENTIONS:- Identify barriers to discharge w/patient and caregiver- Arrange for needed discharge resources and transportation as appropriate- Identify discharge learning needs (meds, wound care, etc.)- Arrange for interpretive services to assist at discharge as needed- Refer to Case Management Department for coordinating discharge planning if the patient needs post-hospital services based on physician/advanced practitioner order or complex needs related to  functional status, cognitive ability, or social support system  Outcome: Progressing     Problem: Knowledge Deficit  Goal: Patient/family/caregiver demonstrates understanding of disease process, treatment plan, medications, and discharge instructions  Description: Complete learning assessment and assess knowledge base.Interventions:- Provide teaching at level of understanding- Provide teaching via preferred learning methods  Outcome: Progressing     Problem: NEUROSENSORY - ADULT  Goal: Achieves stable or improved neurological status  Description: INTERVENTIONS- Monitor and report changes in neurological status- Monitor vital signs such as temperature, blood pressure, glucose, and any other labs ordered - Initiate measures to prevent increased intracranial pressure- Monitor for seizure activity and implement precautions if appropriate    Outcome: Progressing  Goal: Achieves maximal functionality and self care  Description: INTERVENTIONS- Monitor swallowing and airway patency with patient fatigue and changes in neurological status- Encourage and assist patient to increase activity and self care. - Encourage visually impaired, hearing impaired and aphasic patients to use assistive/communication devices  Outcome: Progressing     Problem: CARDIOVASCULAR - ADULT  Goal: Maintains optimal cardiac output and hemodynamic stability  Description: INTERVENTIONS:- Monitor I/O, vital signs and rhythm- Monitor for S/S and trends of decreased cardiac output- Administer and titrate ordered vasoactive medications to optimize hemodynamic stability- Assess quality of pulses, skin color and temperature- Assess for signs of decreased coronary artery perfusion- Instruct patient to report change in severity of symptoms  Outcome: Progressing  Goal: Absence of cardiac dysrhythmias or at baseline rhythm  Description: INTERVENTIONS:- Continuous cardiac monitoring, vital signs, obtain 12 lead EKG if ordered- Administer antiarrhythmic and heart  rate control medications as ordered- Monitor electrolytes and administer replacement therapy as ordered  Outcome: Progressing     Problem: MUSCULOSKELETAL - ADULT  Goal: Maintain or return mobility to safest level of function  Description: INTERVENTIONS:- Assess patient's ability to carry out ADLs; assess patient's baseline for ADL function and identify physical deficits which impact ability to perform ADLs (bathing, care of mouth/teeth, toileting, grooming, dressing, etc.)- Assess/evaluate cause of self-care deficits - Assess range of motion- Assess patient's mobility- Assess patient's need for assistive devices and provide as appropriate- Encourage maximum independence but intervene and supervise when necessary- Involve family in performance of ADLs- Assess for home care needs following discharge - Consider OT consult to assist with ADL evaluation and planning for discharge- Provide patient education as appropriate  Outcome: Progressing  Goal: Maintain proper alignment of affected body part  Description: INTERVENTIONS:- Support, maintain and protect limb and body alignment- Provide patient/ family with appropriate education  Outcome: Progressing

## 2025-05-07 NOTE — ASSESSMENT & PLAN NOTE
Lab Results   Component Value Date    CREATININE 0.78 05/07/2025    CREATININE 0.93 05/04/2025    CREATININE 1.65 (H) 05/03/2025       Lab Results   Component Value Date    EGFR 98 05/07/2025    EGFR 89 05/04/2025    EGFR 44 05/03/2025       On admission, baseline creatinine appears 1  Hold nephrotoxins and avoid hypotension  resolved

## 2025-05-07 NOTE — ANESTHESIA POSTPROCEDURE EVALUATION
Post-Op Assessment Note    Last Filed PACU Vitals:  Vitals Value Taken Time   Temp 98 °F (36.7 °C) 05/05/25 2105   Pulse 62 05/05/25 2141   /94 05/05/25 2131   Resp 22 05/05/25 2141   SpO2 95 % 05/05/25 2141   Vitals shown include unfiled device data.    Modified Nayana:     Vitals Value Taken Time   Activity 2 05/05/25 2130   Respiration 2 05/05/25 2130   Circulation 2 05/05/25 2130   Consciousness 2 05/05/25 2130   Oxygen Saturation 1 05/05/25 2130     Modified Nayana Score: 9

## 2025-05-07 NOTE — PROGRESS NOTES
"Progress Note - Hospitalist   Name: Roland Campbell 60 y.o. male I MRN: [id number]  Unit/Bed#: [facility] 503-01 I Date of Admission: 5/2/2025   Date of Service: 5/7/2025 I Hospital Day: 4    Assessment & Plan  Critical limb ischemia of right lower extremity (HCC)  Patient presenting with sudden onset right lower extremity pain developing while walking, did note some paresthesias with ambulation on the way home  During ED evaluation patient noted with cool right foot compared to left, initial inability to doppler right DP/PT pulses and overall concern for critical limb ischemia  CTA abdomen with runoff as below concerning for \"short segment dissection versus occlusion of the distal left SFA, right SFA stenosis secondary to soft plaque with distal right popliteal occlusion with right PT reconstitution, abrupt termination of right PT and hindfoot region AT/DP not visualized ankle\"  Status post right lower extremity angiogram with mechanical thrombectomy on 5/3/2025  CTA was done on 5/4/2025  Focal linear area of low-attenuation in the descending thoracic aorta with differential diagnoses including artifact related to mixing of blood, small amount of thrombus or minimal aortic injury. Short interval follow-up with CT chest is recommended in 3 to 5 days interval.  Previous provider discussed with vascular and they said no intervention necessary.  Continue anticoagulation.   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  On heparin gtt. VTE protocol, continue for now  Will keep on heparin drip for now because patient will get KAREN  Eliquis covered by his insurance without a copay  KAREN as advised by vascular will consult cards for this  Advised embolic workup according to vascular surgery  Pending KAREN results  Continue ASA, atorvastatin started  Continue as needed analgesia  Neurovascular checks  Abnormal CT of the head  Disproportionate enlargement of the third and lateral ventricles relative to size of cortical " sulci at the vertex is a nonspecific finding which could relate to asymmetric central white matter volume loss or normal pressure hydrocephalus.    At this time patient does not have any urinary incontinence symptoms as well as ambulatory dysfunction  PT OT  Is not presenting as NPH, will continue to monitor for now.     Dental caries  Patient currently has swelling of the right side of face with tenderness of the upper part of his mandible.    Did get a Panorex which shows teeth #2 and 3 in the left maxilla are fractured off at the gumline/absent with retained root structures noted with suggestion of possible mild lucency around the root structures. There also appears to be some minimal lucency around the root structure of   the first tooth and there is a filling in that molar.  There appears to be a cavity near the gumline in tooth #13. The remaining teeth appear grossly within normal limits. 2 of the right maxillary molars and several of the left mandibular teeth are completely absent.    S/p dental extraction by OMS  Continue augmentin    Acute renal failure (ARF) (HCC)  Lab Results   Component Value Date    CREATININE 0.78 05/07/2025    CREATININE 0.93 05/04/2025    CREATININE 1.65 (H) 05/03/2025       Lab Results   Component Value Date    EGFR 98 05/07/2025    EGFR 89 05/04/2025    EGFR 44 05/03/2025       On admission, baseline creatinine appears 1  Hold nephrotoxins and avoid hypotension  resolved  Essential hypertension  Continue PTA amlodipine 10 mg daily and metoprolol succinate 50 mg daily with strict hold parameters.  Holding PTA lisinopril due to need for vascular surgery intervention and acute renal failure  Labetalol for blood pressure greater than 160 systolic  Hypertriglyceridemia  Lab Results   Component Value Date    CHOLESTEROL 156 05/03/2025    LDLCALC 99 05/03/2025    HDL 38 (L) 05/03/2025     Continue atorvastatin 40 mg once daily, aspirin 81 mg once daily    Tobacco use  Counseled the  patient on need for complete cessation  Provide nicotine patch while admitted    VTE Pharmacologic Prophylaxis: VTE Score: 4 Moderate Risk (Score 3-4) - Pharmacological DVT Prophylaxis Ordered: heparin drip.    Mobility:   Basic Mobility Inpatient Raw Score: 24  JH-HLM Goal: 8: Walk 250 feet or more  JH-HLM Achieved: 6: Walk 10 steps or more  JH-HLM Goal NOT achieved. Continue with multidisciplinary rounding and encourage appropriate mobility to improve upon JH-HLM goals.    Patient Centered Rounds: I performed bedside rounds with nursing staff today.   Discussions with Specialists or Other Care Team Provider: nurse, CM    Current Length of Stay: 4 day(s)  Current Patient Status: Inpatient   Certification Statement: The patient will continue to require additional inpatient hospital stay due to IV anticoagulation, awaiting KAREN results  Discharge Plan: Anticipate discharge in 24-48 hrs to home with home services.    Code Status: Level 1 - Full Code    Subjective   Denies any new complaints today    Objective :  Temp:  [97.2 °F (36.2 °C)-98.2 °F (36.8 °C)] 97.6 °F (36.4 °C)  HR:  [54-81] 55  BP: (152-174)/(76-89) 165/89  Resp:  [15-21] 16  SpO2:  [91 %-99 %] 93 %  O2 Device: EtCO2 mask    Body mass index is 26.76 kg/m².     Input and Output Summary (last 24 hours):     Intake/Output Summary (Last 24 hours) at 5/7/2025 1448  Last data filed at 5/7/2025 1000  Gross per 24 hour   Intake 1347.12 ml   Output 1650 ml   Net -302.88 ml       Physical Exam  Constitutional:       Appearance: Normal appearance.   HENT:      Head: Normocephalic and atraumatic.      Nose: Nose normal.   Eyes:      Extraocular Movements: Extraocular movements intact.   Cardiovascular:      Rate and Rhythm: Normal rate and regular rhythm.   Pulmonary:      Effort: Pulmonary effort is normal.      Breath sounds: No wheezing or rales.   Musculoskeletal:      Right lower leg: No edema.      Left lower leg: No edema.   Skin:     General: Skin is warm and  dry.   Neurological:      Mental Status: He is alert and oriented to person, place, and time.   Psychiatric:         Mood and Affect: Mood normal.         Behavior: Behavior normal.       Lines/Drains:        Telemetry:  Telemetry Orders (From admission, onward)               24 Hour Telemetry Monitoring  Continuous x 24 Hours (Telem)        Expiring   Question:  Reason for 24 Hour Telemetry  Answer:  Arrhythmias requiring acute medical intervention / PPM or ICD malfunction                     Telemetry Reviewed: Normal Sinus Rhythm  Indication for Continued Telemetry Use: Arrthymias requiring medical therapy               Lab Results: I have reviewed the following results:   Results from last 7 days   Lab Units 05/07/25  0245   WBC Thousand/uL 19.64*   HEMOGLOBIN g/dL 14.4   HEMATOCRIT % 41.9   PLATELETS Thousands/uL 316   SEGS PCT % 78*   LYMPHO PCT % 14   MONO PCT % 7   EOS PCT % 1     Results from last 7 days   Lab Units 05/07/25  0245   SODIUM mmol/L 139   POTASSIUM mmol/L 3.4*   CHLORIDE mmol/L 104   CO2 mmol/L 24   BUN mg/dL 13   CREATININE mg/dL 0.78   ANION GAP mmol/L 11   CALCIUM mg/dL 9.5   GLUCOSE RANDOM mg/dL 132     Results from last 7 days   Lab Units 05/02/25  2337   INR  1.01                   Recent Cultures (last 7 days):         Imaging Results Review: No pertinent imaging studies reviewed.  Other Study Results Review: No additional pertinent studies reviewed.    Last 24 Hours Medication List:     Current Facility-Administered Medications:     acetaminophen (TYLENOL) tablet 650 mg, Q6H PRN    amLODIPine (NORVASC) tablet 10 mg, Daily    amoxicillin-clavulanate (AUGMENTIN) 875-125 mg per tablet 1 tablet, Q12H LAUREN    aspirin (ECOTRIN LOW STRENGTH) EC tablet 81 mg, Daily    atorvastatin (LIPITOR) tablet 40 mg, Daily With Dinner    heparin (porcine) 25,000 units in 0.45% NaCl 250 mL infusion (premix), Titrated, Last Rate: 22 Units/kg/hr (05/07/25 1435)    HYDROmorphone (DILAUDID) injection 0.5 mg, Q4H  PRN    labetalol (NORMODYNE) injection 10 mg, Q4H PRN    [START ON 5/8/2025] metoprolol succinate (TOPROL-XL) 24 hr tablet 25 mg, Daily    nicotine (NICODERM CQ) 14 mg/24hr TD 24 hr patch 1 patch, Daily    ondansetron (ZOFRAN) injection 4 mg, Q6H PRN    oxyCODONE (ROXICODONE) IR tablet 5 mg, Q6H PRN    potassium chloride (Klor-Con M20) CR tablet 20 mEq, TID With Meals    Facility-Administered Medications Ordered in Other Encounters:     sodium chloride 0.9 % infusion, Continuous PRN    Administrative Statements   Today, Patient Was Seen By: Fermin Pike MD  I have spent a total time of 40 minutes in caring for this patient on the day of the visit/encounter including Diagnostic results, Instructions for management, Patient and family education, Impressions, Counseling / Coordination of care, Documenting in the medical record, Reviewing/placing orders in the medical record (including tests, medications, and/or procedures), Obtaining or reviewing history  , and Communicating with other healthcare professionals .    **Please Note: This note may have been constructed using a voice recognition system.**

## 2025-05-07 NOTE — ASSESSMENT & PLAN NOTE
"Patient presenting with sudden onset right lower extremity pain developing while walking, did note some paresthesias with ambulation on the way home  During ED evaluation patient noted with cool right foot compared to left, initial inability to doppler right DP/PT pulses and overall concern for critical limb ischemia  CTA abdomen with runoff as below concerning for \"short segment dissection versus occlusion of the distal left SFA, right SFA stenosis secondary to soft plaque with distal right popliteal occlusion with right PT reconstitution, abrupt termination of right PT and hindfoot region AT/DP not visualized ankle\"  Status post right lower extremity angiogram with mechanical thrombectomy on 5/3/2025  CTA was done on 5/4/2025  Focal linear area of low-attenuation in the descending thoracic aorta with differential diagnoses including artifact related to mixing of blood, small amount of thrombus or minimal aortic injury. Short interval follow-up with CT chest is recommended in 3 to 5 days interval.  Previous provider discussed with vascular and they said no intervention necessary.  Continue anticoagulation.   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  On heparin gtt. VTE protocol, continue for now  Will keep on heparin drip for now because patient will get KAREN  Eliquis covered by his insurance without a copay  KAREN as advised by vascular will consult cards for this  Advised embolic workup according to vascular surgery  Pending KAREN results  Continue ASA, atorvastatin started  Continue as needed analgesia  Neurovascular checks  "

## 2025-05-07 NOTE — ANESTHESIA PREPROCEDURE EVALUATION
Procedure:  KAREN    Relevant Problems   ANESTHESIA (within normal limits)      CARDIO   (+) Critical limb ischemia of right lower extremity (HCC)   (+) Essential hypertension   (+) Hypertriglyceridemia      ENDO (within normal limits)      GI/HEPATIC (within normal limits)      /RENAL   (+) Acute renal failure (ARF) (HCC)      GYN (within normal limits)      HEMATOLOGY (within normal limits)      MUSCULOSKELETAL (within normal limits)      NEURO/PSYCH (within normal limits)      PULMONARY (within normal limits)      Acute Limb Ischemia s/p thrombectomy.   Physical Exam    Airway    Mallampati score: III  TM Distance: >3 FB  Neck ROM: full     Dental   No notable dental hx     Cardiovascular  Cardiovascular exam normal    Pulmonary  Pulmonary exam normal     Other Findings        Anesthesia Plan  ASA Score- 3     Anesthesia Type- IV sedation with anesthesia with ASA Monitors.         Additional Monitors:     Airway Plan:            Plan Factors-Exercise tolerance (METS): >4 METS.    Chart reviewed. EKG reviewed. Imaging results reviewed. Existing labs reviewed. Patient summary reviewed.    Patient is a current smoker.  Patient instructed to abstain from smoking on day of procedure. Patient did not smoke on day of surgery.            Induction- intravenous.    Postoperative Plan-     Perioperative Resuscitation Plan - Level 1 - Full Code.       Informed Consent- Anesthetic plan and risks discussed with patient.  I personally reviewed this patient with the CRNA. Discussed and agreed on the Anesthesia Plan with the CRNA..      NPO Status:  Vitals Value Taken Time   Date of last liquid 05/04/25 05/05/25 2003   Time of last liquid 1800 05/05/25 2003   Date of last solid 05/04/25 05/05/25 2003   Time of last solid 1800 05/05/25 2003     Discussed IV Sedation with General Anesthesia as backup with patient including but not limited to risk of cardiac insult, pulmonary complication, stroke, reaction to medications and death.  All questions answered and consent was obtained.

## 2025-05-08 VITALS
OXYGEN SATURATION: 97 % | HEART RATE: 68 BPM | TEMPERATURE: 97.8 F | WEIGHT: 176 LBS | RESPIRATION RATE: 16 BRPM | SYSTOLIC BLOOD PRESSURE: 134 MMHG | HEIGHT: 68 IN | DIASTOLIC BLOOD PRESSURE: 67 MMHG | BODY MASS INDEX: 26.67 KG/M2

## 2025-05-08 LAB
ANION GAP SERPL CALCULATED.3IONS-SCNC: 8 MMOL/L (ref 4–13)
BUN SERPL-MCNC: 15 MG/DL (ref 5–25)
CALCIUM SERPL-MCNC: 9.5 MG/DL (ref 8.4–10.2)
CHLORIDE SERPL-SCNC: 106 MMOL/L (ref 96–108)
CO2 SERPL-SCNC: 26 MMOL/L (ref 21–32)
CREAT SERPL-MCNC: 0.79 MG/DL (ref 0.6–1.3)
ERYTHROCYTE [DISTWIDTH] IN BLOOD BY AUTOMATED COUNT: 13.1 % (ref 11.6–15.1)
GFR SERPL CREATININE-BSD FRML MDRD: 98 ML/MIN/1.73SQ M
GLUCOSE SERPL-MCNC: 108 MG/DL (ref 65–140)
HCT VFR BLD AUTO: 42.6 % (ref 36.5–49.3)
HGB BLD-MCNC: 14.6 G/DL (ref 12–17)
MAGNESIUM SERPL-MCNC: 1.7 MG/DL (ref 1.9–2.7)
MCH RBC QN AUTO: 31.9 PG (ref 26.8–34.3)
MCHC RBC AUTO-ENTMCNC: 34.3 G/DL (ref 31.4–37.4)
MCV RBC AUTO: 93 FL (ref 82–98)
PHOSPHATE SERPL-MCNC: 3.9 MG/DL (ref 2.7–4.5)
PLATELET # BLD AUTO: 303 THOUSANDS/UL (ref 149–390)
PMV BLD AUTO: 9.4 FL (ref 8.9–12.7)
POTASSIUM SERPL-SCNC: 3.8 MMOL/L (ref 3.5–5.3)
RBC # BLD AUTO: 4.57 MILLION/UL (ref 3.88–5.62)
SODIUM SERPL-SCNC: 140 MMOL/L (ref 135–147)
WBC # BLD AUTO: 11.19 THOUSAND/UL (ref 4.31–10.16)

## 2025-05-08 PROCEDURE — 84100 ASSAY OF PHOSPHORUS: CPT | Performed by: INTERNAL MEDICINE

## 2025-05-08 PROCEDURE — 85027 COMPLETE CBC AUTOMATED: CPT | Performed by: INTERNAL MEDICINE

## 2025-05-08 PROCEDURE — 99239 HOSP IP/OBS DSCHRG MGMT >30: CPT | Performed by: INTERNAL MEDICINE

## 2025-05-08 PROCEDURE — 83735 ASSAY OF MAGNESIUM: CPT | Performed by: INTERNAL MEDICINE

## 2025-05-08 PROCEDURE — 80048 BASIC METABOLIC PNL TOTAL CA: CPT | Performed by: INTERNAL MEDICINE

## 2025-05-08 RX ORDER — ATORVASTATIN CALCIUM 40 MG/1
40 TABLET, FILM COATED ORAL
Qty: 30 TABLET | Refills: 1 | Status: SHIPPED | OUTPATIENT
Start: 2025-05-08

## 2025-05-08 RX ORDER — MAGNESIUM SULFATE HEPTAHYDRATE 40 MG/ML
2 INJECTION, SOLUTION INTRAVENOUS ONCE
Status: COMPLETED | OUTPATIENT
Start: 2025-05-08 | End: 2025-05-08

## 2025-05-08 RX ADMIN — METOPROLOL SUCCINATE 25 MG: 25 TABLET, EXTENDED RELEASE ORAL at 08:51

## 2025-05-08 RX ADMIN — ASPIRIN 81 MG: 81 TABLET, COATED ORAL at 07:21

## 2025-05-08 RX ADMIN — AMLODIPINE BESYLATE 10 MG: 10 TABLET ORAL at 07:21

## 2025-05-08 RX ADMIN — NICOTINE 1 PATCH: 14 PATCH, EXTENDED RELEASE TRANSDERMAL at 07:24

## 2025-05-08 RX ADMIN — MAGNESIUM SULFATE HEPTAHYDRATE 2 G: 40 INJECTION, SOLUTION INTRAVENOUS at 06:02

## 2025-05-08 RX ADMIN — AMOXICILLIN AND CLAVULANATE POTASSIUM 1 TABLET: 875; 125 TABLET, COATED ORAL at 07:22

## 2025-05-08 RX ADMIN — APIXABAN 10 MG: 5 TABLET, FILM COATED ORAL at 07:21

## 2025-05-08 RX ADMIN — POTASSIUM CHLORIDE 20 MEQ: 1500 TABLET, EXTENDED RELEASE ORAL at 06:02

## 2025-05-08 NOTE — ASSESSMENT & PLAN NOTE
Patient currently has swelling of the right side of face with tenderness of the upper part of his mandible.    Did get a Panorex which shows teeth #2 and 3 in the left maxilla are fractured off at the gumline/absent with retained root structures noted with suggestion of possible mild lucency around the root structures. There also appears to be some minimal lucency around the root structure of   the first tooth and there is a filling in that molar.  There appears to be a cavity near the gumline in tooth #13. The remaining teeth appear grossly within normal limits. 2 of the right maxillary molars and several of the left mandibular teeth are completely absent.    S/p dental extraction by OMS  Continue augmentin for 10 more days

## 2025-05-08 NOTE — ASSESSMENT & PLAN NOTE
"Patient presenting with sudden onset right lower extremity pain developing while walking, did note some paresthesias with ambulation on the way home  During ED evaluation patient noted with cool right foot compared to left, initial inability to doppler right DP/PT pulses and overall concern for critical limb ischemia  CTA abdomen with runoff as below concerning for \"short segment dissection versus occlusion of the distal left SFA, right SFA stenosis secondary to soft plaque with distal right popliteal occlusion with right PT reconstitution, abrupt termination of right PT and hindfoot region AT/DP not visualized ankle\"  Status post right lower extremity angiogram with mechanical thrombectomy on 5/3/2025  CTA was done on 5/4/2025  Focal linear area of low-attenuation in the descending thoracic aorta with differential diagnoses including artifact related to mixing of blood, small amount of thrombus or minimal aortic injury. Short interval follow-up with CT chest is recommended in 3 to 5 days interval.  Previous provider discussed with vascular and they said no intervention necessary.  Continue anticoagulation.   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  Continue Eliquis  No thrombus on KAREN  Outpatient hematology evaluation to evaluate for thrombophilia  Continue ASA, atorvastatin started  "

## 2025-05-08 NOTE — DISCHARGE SUMMARY
"Discharge Summary - Hospitalist   Name: Roland Campbell 60 y.o. male I MRN: [id number]  Unit/Bed#: [facility] 503-01 I Date of Admission: 5/2/2025   Date of Service: 5/8/2025 I Hospital Day: 5     Assessment & Plan  Critical limb ischemia of right lower extremity (HCC)  Patient presenting with sudden onset right lower extremity pain developing while walking, did note some paresthesias with ambulation on the way home  During ED evaluation patient noted with cool right foot compared to left, initial inability to doppler right DP/PT pulses and overall concern for critical limb ischemia  CTA abdomen with runoff as below concerning for \"short segment dissection versus occlusion of the distal left SFA, right SFA stenosis secondary to soft plaque with distal right popliteal occlusion with right PT reconstitution, abrupt termination of right PT and hindfoot region AT/DP not visualized ankle\"  Status post right lower extremity angiogram with mechanical thrombectomy on 5/3/2025  CTA was done on 5/4/2025  Focal linear area of low-attenuation in the descending thoracic aorta with differential diagnoses including artifact related to mixing of blood, small amount of thrombus or minimal aortic injury. Short interval follow-up with CT chest is recommended in 3 to 5 days interval.  Previous provider discussed with vascular and they said no intervention necessary.  Continue anticoagulation.   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  Continue Eliquis  No thrombus on KAREN  Outpatient hematology evaluation to evaluate for thrombophilia  Continue ASA, atorvastatin started  Abnormal CT of the head  Disproportionate enlargement of the third and lateral ventricles relative to size of cortical sulci at the vertex is a nonspecific finding which could relate to asymmetric central white matter volume loss or normal pressure hydrocephalus.    At this time patient does not have any urinary incontinence symptoms as well as ambulatory " dysfunction  PT OT  Is not presenting as NPH, will continue to monitor for now.     Dental caries  Patient currently has swelling of the right side of face with tenderness of the upper part of his mandible.    Did get a Panorex which shows teeth #2 and 3 in the left maxilla are fractured off at the gumline/absent with retained root structures noted with suggestion of possible mild lucency around the root structures. There also appears to be some minimal lucency around the root structure of   the first tooth and there is a filling in that molar.  There appears to be a cavity near the gumline in tooth #13. The remaining teeth appear grossly within normal limits. 2 of the right maxillary molars and several of the left mandibular teeth are completely absent.    S/p dental extraction by OMS  Continue augmentin for 10 more days    Acute renal failure (ARF) ([facility])  Lab Results   Component Value Date    CREATININE 0.79 05/08/2025    CREATININE 0.78 05/07/2025    CREATININE 0.93 05/04/2025       Lab Results   Component Value Date    EGFR 98 05/08/2025    EGFR 98 05/07/2025    EGFR 89 05/04/2025       On admission, baseline creatinine appears 1  Hold nephrotoxins and avoid hypotension  resolved  Essential hypertension  Continue PTA amlodipine 10 mg daily and metoprolol succinate 50 mg daily with strict hold parameters.  Holding PTA lisinopril due to need for vascular surgery intervention and acute renal failure  Reports that he no longer takes metoprolol outpatient so will discontinue  Hypertriglyceridemia  Lab Results   Component Value Date    CHOLESTEROL 156 05/03/2025    LDLCALC 99 05/03/2025    HDL 38 (L) 05/03/2025     Continue atorvastatin 40 mg once daily, aspirin 81 mg once daily    Tobacco use  Counseled the patient on need for complete cessation  Provide nicotine patch while admitted     Medical Problems       Resolved Problems  Date Reviewed: 5/3/2025   None       Discharging Physician / Practitioner: Fermin LALEN  MD Glendy  PCP: Cristian Hutton  Admission Date:   Admission Orders (From admission, onward)       Ordered        05/03/25 0245  INPATIENT ADMISSION  Once                          Discharge Date: 05/08/25    Consultations During Hospital Stay:  Vascular survery  OMFS    Procedures Performed:   Surgical extraction tooth #4  Removal of root tips numbers 14 and 15  1.  Left common femoral artery access with ultrasound guidance  2.  Right lower extremity arteriography  3.  Mechanical thrombectomy of the tibioperoneal trunk followed by 3 mm balloon angioplasty.  4.  Left common femoral arteriotomy closure using Vascade    Significant Findings / Test Results:   1.  No abdominal aortic aneurysm or dissection. Scattered calcific atherosclerosis of the abdominal aorta, iliac arteries, and lower extremity arteries as described above.  2.  Very short segment of dissection versus vessel bifurcation involving the distal left superficial femoral artery is seen.  3.  Short segment of mild to moderate luminal narrowing of the distal right superficial femoral artery due to soft plaque is seen.  4.  There is abrupt complete occlusion of the distal right popliteal artery with reconstitution of the right posterior tibial artery more inferiorly. Abrupt termination of the right posterior tibial artery in the hindfoot region consistent with age-indeterminate occlusion. Nonvisualization of the right tarsal arch. Right anterior tibial artery/dorsalis pedis artery is not visualized in the right ankle region.  5.  No acute intra-abdominal/pelvic abnormalities noted with findings detailed above.    1. Focal linear area of low-attenuation in the descending thoracic aorta with differential diagnoses including artifact related to mixing of blood, small amount of thrombus or minimal aortic injury. Short interval follow-up with CT chest is recommended   in 3 to 5 days interval.  2. Splenic infarct which has developed since the previous  exam.  3. Soft tissue density in the anterior mediastinum most likely reflecting thymic hyperplasia. This was described on the prior exam although images are not available for direct comparison.  4. Adrenal hyperplasia.    Diminished attenuation involving white matter described above is a nonspecific finding most often due to chronic small vessel white matter ischemic disease; the degree of which greater than typical for age.     Disproportionate enlargement of the third and lateral ventricles relative to size of cortical sulci at the vertex is a nonspecific finding which could relate to asymmetric central white matter volume loss or normal pressure hydrocephalus.    Left Ventricle: Left ventricular cavity size is normal. Wall thickness is moderately increased. The left ventricular ejection fraction is 60% by visual estimation. Systolic function is normal. Although no diagnostic regional wall motion abnormality was identified, this possibility cannot be completely excluded on the basis of this study.    Right Ventricle: Right ventricular cavity size is normal. Systolic function is normal.    Left Atrium: There is no thrombus.    Atrial Septum: No patent foramen ovale detected, confirmed at rest using color doppler and using agitated saline contrast, confirmed by provocation with abdominal compression, using agitated saline contrast.    Left Atrial Appendage: There is no thrombus.    Incidental Findings:   Mediastinal nodule  Adrenal hyperplasia  Splenic infarction  I reviewed the above mentioned incidental findings with the patient and/or family and they expressed understanding.    Test Results Pending at Discharge (will require follow up):   None     Outpatient Tests Requested:  Thrombophilia evaluation    Complications:  None    Reason for Admission: limb ischemia    Hospital Course:   Roland Campbell is a 60 y.o. male patient who originally presented to the hospital on 5/2/2025 due to limb ischemia. He was seen  "in the ED and had the CTA with the above mentioned findings. He was started on heparin and seen by vascular surgery. He had a thrombectomy performed and was transitioned to oral anticoagulation. He had a KAREN which was negative for cardiac thrombus or ASD. He stabilized and was discharged home.     Please see above list of diagnoses and related plan for additional information.     Condition at Discharge: stable    Discharge Day Visit / Exam:   Subjective:  denies any new complaints today  Vitals: Blood Pressure: 134/67 (05/08/25 0851)  Pulse: 68 (05/08/25 0851)  Temperature: 97.8 °F (36.6 °C) (05/08/25 0717)  Temp Source: Axillary (05/08/25 0717)  Respirations: 16 (05/08/25 0717)  Height: 5' 8\" (172.7 cm) (05/07/25 1430)  Weight - Scale: 79.8 kg (176 lb) (05/07/25 1430)  SpO2: 96 % (05/08/25 0717)  Physical Exam  Constitutional:       Appearance: Normal appearance.   HENT:      Head: Normocephalic and atraumatic.      Nose: Nose normal.   Eyes:      Extraocular Movements: Extraocular movements intact.   Cardiovascular:      Rate and Rhythm: Normal rate and regular rhythm.   Pulmonary:      Effort: Pulmonary effort is normal.      Breath sounds: No wheezing or rales.   Musculoskeletal:      Right lower leg: No edema.      Left lower leg: No edema.   Neurological:      Mental Status: He is alert.          Discussion with Family: Patient declined call to .     Discharge instructions/Information to patient and family:   See after visit summary for information provided to patient and family.      Provisions for Follow-Up Care:  See after visit summary for information related to follow-up care and any pertinent home health orders.      Mobility at time of Discharge:   Basic Mobility Inpatient Raw Score: 24  JH-HLM Goal: 8: Walk 250 feet or more  JH-HLM Achieved: 8: Walk 250 feet ot more  HLM Goal achieved. Continue to encourage appropriate mobility.     Disposition:   Home    Planned Readmission: " No    Discharge Medications:  See after visit summary for reconciled discharge medications provided to patient and/or family.      Administrative Statements   Discharge Statement:  I have spent a total time of 45 minutes in caring for this patient on the day of the visit/encounter. >30 minutes of time was spent on: Diagnostic results, Instructions for management, Patient and family education, Impressions, Counseling / Coordination of care, Documenting in the medical record, Reviewing / ordering tests, medicine, procedures  , and Communicating with other healthcare professionals .    **Please Note: This note may have been constructed using a voice recognition system**

## 2025-05-08 NOTE — INCIDENTAL FINDINGS
The following findings require follow up:  Radiographic finding   Finding: Adrenal hyperplasia   Follow up required: Primary care provider appointment   Follow up should be done within 1 month(s)    Please notify the following clinician to assist with the follow up:   Dr. Hutton    Incidental finding results were discussed with the Patient by Fermin Pike MD on 05/08/25.   They expressed understanding and all questions answered.

## 2025-05-08 NOTE — PLAN OF CARE
Problem: PAIN - ADULT  Goal: Verbalizes/displays adequate comfort level or baseline comfort level  Description: Interventions:- Encourage patient to monitor pain and request assistance- Assess pain using appropriate pain scale- Administer analgesics based on type and severity of pain and evaluate response- Implement non-pharmacological measures as appropriate and evaluate response- Consider cultural and social influences on pain and pain management- Notify physician/advanced practitioner if interventions unsuccessful or patient reports new pain  Outcome: Progressing     Problem: INFECTION - ADULT  Goal: Absence or prevention of progression during hospitalization  Description: INTERVENTIONS:- Assess and monitor for signs and symptoms of infection- Monitor lab/diagnostic results- Monitor all insertion sites, i.e. indwelling lines, tubes, and drains- Monitor endotracheal if appropriate and nasal secretions for changes in amount and color- [place] appropriate cooling/warming therapies per order- Administer medications as ordered- Instruct and encourage patient and family to use good hand hygiene technique- Identify and instruct in appropriate isolation precautions for identified infection/condition  Outcome: Progressing  Goal: Absence of fever/infection during neutropenic period  Description: INTERVENTIONS:- Monitor WBC  Outcome: Progressing     Problem: DISCHARGE PLANNING  Goal: Discharge to home or other facility with appropriate resources  Description: INTERVENTIONS:- Identify barriers to discharge w/patient and caregiver- Arrange for needed discharge resources and transportation as appropriate- Identify discharge learning needs (meds, wound care, etc.)- Arrange for interpretive services to assist at discharge as needed- Refer to Case Management Department for coordinating discharge planning if the patient needs post-hospital services based on physician/advanced practitioner order or complex needs related to  functional status, cognitive ability, or social support system  Outcome: Progressing     Problem: Knowledge Deficit  Goal: Patient/family/caregiver demonstrates understanding of disease process, treatment plan, medications, and discharge instructions  Description: Complete learning assessment and assess knowledge base.Interventions:- Provide teaching at level of understanding- Provide teaching via preferred learning methods  Outcome: Progressing     Problem: NEUROSENSORY - ADULT  Goal: Achieves stable or improved neurological status  Description: INTERVENTIONS- Monitor and report changes in neurological status- Monitor vital signs such as temperature, blood pressure, glucose, and any other labs ordered - Initiate measures to prevent increased intracranial pressure- Monitor for seizure activity and implement precautions if appropriate    Outcome: Progressing  Goal: Achieves maximal functionality and self care  Description: INTERVENTIONS- Monitor swallowing and airway patency with patient fatigue and changes in neurological status- Encourage and assist patient to increase activity and self care. - Encourage visually impaired, hearing impaired and aphasic patients to use assistive/communication devices  Outcome: Progressing     Problem: CARDIOVASCULAR - ADULT  Goal: Maintains optimal cardiac output and hemodynamic stability  Description: INTERVENTIONS:- Monitor I/O, vital signs and rhythm- Monitor for S/S and trends of decreased cardiac output- Administer and titrate ordered vasoactive medications to optimize hemodynamic stability- Assess quality of pulses, skin color and temperature- Assess for signs of decreased coronary artery perfusion- Instruct patient to report change in severity of symptoms  Outcome: Progressing  Goal: Absence of cardiac dysrhythmias or at baseline rhythm  Description: INTERVENTIONS:- Continuous cardiac monitoring, vital signs, obtain 12 lead EKG if ordered- Administer antiarrhythmic and heart  rate control medications as ordered- Monitor electrolytes and administer replacement therapy as ordered  Outcome: Progressing     Problem: MUSCULOSKELETAL - ADULT  Goal: Maintain or return mobility to safest level of function  Description: INTERVENTIONS:- Assess patient's ability to carry out ADLs; assess patient's baseline for ADL function and identify physical deficits which impact ability to perform ADLs (bathing, care of mouth/teeth, toileting, grooming, dressing, etc.)- Assess/evaluate cause of self-care deficits - Assess range of motion- Assess patient's mobility- Assess patient's need for assistive devices and provide as appropriate- Encourage maximum independence but intervene and supervise when necessary- Involve family in performance of ADLs- Assess for home care needs following discharge - Consider OT consult to assist with ADL evaluation and planning for discharge- Provide patient education as appropriate  Outcome: Progressing  Goal: Maintain proper alignment of affected body part  Description: INTERVENTIONS:- Support, maintain and protect limb and body alignment- Provide patient/ family with appropriate education  Outcome: Progressing

## 2025-05-08 NOTE — ASSESSMENT & PLAN NOTE
Lab Results   Component Value Date    CREATININE 0.79 05/08/2025    CREATININE 0.78 05/07/2025    CREATININE 0.93 05/04/2025       Lab Results   Component Value Date    EGFR 98 05/08/2025    EGFR 98 05/07/2025    EGFR 89 05/04/2025       On admission, baseline creatinine appears 1  Hold nephrotoxins and avoid hypotension  resolved

## 2025-05-08 NOTE — ASSESSMENT & PLAN NOTE
Continue PTA amlodipine 10 mg daily and metoprolol succinate 50 mg daily with strict hold parameters.  Holding PTA lisinopril due to need for vascular surgery intervention and acute renal failure  Reports that he no longer takes metoprolol outpatient so will discontinue

## 2025-05-08 NOTE — INCIDENTAL FINDINGS
The following findings require follow up:  Radiographic finding   Finding: Soft tissue density in the anterior mediastinum most likely reflecting thymic hyperplasia    Follow up required: Primary care provider appointment   Follow up should be done within 1 month(s)    Please notify the following clinician to assist with the follow up:   Dr. Hutton    Incidental finding results were discussed with the Patient by Fermin Pike MD on 05/08/25.   They expressed understanding and all questions answered.

## 2025-05-09 NOTE — UTILIZATION REVIEW
NOTIFICATION OF ADMISSION DISCHARGE   This is a Notification of Discharge from [facility]. Please be advised that this patient has been discharge from our facility. Below you will find the admission and discharge date and time including the patient’s disposition.   UTILIZATION REVIEW CONTACT:  Utilization Review Assistants  Network Utilization Review Department  Phone: [phone] x carefully listen to the prompts. All voicemails are confidential.  Email: [email or web address]     ADMISSION INFORMATION  PRESENTATION DATE: 5/2/2025 11:08 PM  OBERVATION ADMISSION DATE: N/A  INPATIENT ADMISSION DATE: 5/3/25  2:45 AM   DISCHARGE DATE: 5/8/2025 12:34 PM   DISPOSITION:Home/Self Care    Network Utilization Review Department  ATTENTION: Please call with any questions or concerns to [phone] and carefully listen to the prompts so that you are directed to the right person. All voicemails are confidential.   For Discharge needs, contact Care Management DC Support Team at [phone] opt. 2  Send all requests for admission clinical reviews, approved or denied determinations and any other requests to dedicated fax number below belonging to the campus where the patient is receiving treatment. List of dedicated fax numbers for the Facilities:  FACILITY NAME UR FAX NUMBER   ADMISSION DENIALS (Administrative/Medical Necessity) [phone]   DISCHARGE SUPPORT TEAM ([facility]) [phone]   PARENT CHILD HEALTH (Maternity/NICU/Pediatrics) [phone]   [facility] [phone]   [facility] [phone]   [facility] [phone]   [facility] [phone]   [facility] [phone]   [facility] [phone]   [facility] [phone]   [facility] [phone]   [facility]  [facility] [phone]   [facility] [phone]   [facility] [phone]   [facility] [phone]

## 2025-05-15 ENCOUNTER — APPOINTMENT (EMERGENCY)
Dept: RADIOLOGY | Facility: HOSPITAL | Age: 60
End: 2025-05-15
Payer: MEDICARE

## 2025-05-15 ENCOUNTER — HOSPITAL ENCOUNTER (OUTPATIENT)
Facility: HOSPITAL | Age: 60
Setting detail: OBSERVATION
Discharge: HOME | End: 2025-05-16
Attending: EMERGENCY MEDICINE | Admitting: INTERNAL MEDICINE
Payer: MEDICARE

## 2025-05-15 ENCOUNTER — APPOINTMENT (OUTPATIENT)
Dept: NEUROLOGY | Facility: CLINIC | Age: 60
End: 2025-05-15
Attending: INTERNAL MEDICINE
Payer: MEDICARE

## 2025-05-15 DIAGNOSIS — Z86.718 HISTORY OF THROMBECTOMY: ICD-10-CM

## 2025-05-15 DIAGNOSIS — R40.4 TRANSIENT ALTERATION OF AWARENESS: ICD-10-CM

## 2025-05-15 DIAGNOSIS — R93.0 ABNORMAL CT OF THE HEAD: ICD-10-CM

## 2025-05-15 DIAGNOSIS — Z98.890 HISTORY OF THROMBECTOMY: ICD-10-CM

## 2025-05-15 DIAGNOSIS — G91.2 NPH (NORMAL PRESSURE HYDROCEPHALUS) (HCC): ICD-10-CM

## 2025-05-15 DIAGNOSIS — G89.18 POST-OPERATIVE PAIN: Primary | ICD-10-CM

## 2025-05-15 DIAGNOSIS — R06.02 SHORTNESS OF BREATH: ICD-10-CM

## 2025-05-15 PROBLEM — R55 SYNCOPE: Status: ACTIVE | Noted: 2025-05-15

## 2025-05-15 LAB
2HR DELTA HS TROPONIN: 0 NG/L
ALBUMIN SERPL BCG-MCNC: 4.2 G/DL (ref 3.5–5)
ALP SERPL-CCNC: 69 U/L (ref 34–104)
ALT SERPL W P-5'-P-CCNC: 22 U/L (ref 7–52)
ANION GAP SERPL CALCULATED.3IONS-SCNC: 8 MMOL/L (ref 4–13)
APTT PPP: 31 SECONDS (ref 23–34)
AST SERPL W P-5'-P-CCNC: 13 U/L (ref 13–39)
ATRIAL RATE: 57 BPM
BASOPHILS # BLD AUTO: 0.06 THOUSANDS/ÂΜL (ref 0–0.1)
BASOPHILS NFR BLD AUTO: 1 % (ref 0–1)
BILIRUB SERPL-MCNC: 0.32 MG/DL (ref 0.2–1)
BUN SERPL-MCNC: 21 MG/DL (ref 5–25)
CALCIUM SERPL-MCNC: 9.6 MG/DL (ref 8.4–10.2)
CARDIAC TROPONIN I PNL SERPL HS: 5 NG/L (ref ?–50)
CARDIAC TROPONIN I PNL SERPL HS: 5 NG/L (ref ?–50)
CHLORIDE SERPL-SCNC: 106 MMOL/L (ref 96–108)
CO2 SERPL-SCNC: 26 MMOL/L (ref 21–32)
CREAT SERPL-MCNC: 0.84 MG/DL (ref 0.6–1.3)
EOSINOPHIL # BLD AUTO: 0.39 THOUSAND/ÂΜL (ref 0–0.61)
EOSINOPHIL NFR BLD AUTO: 4 % (ref 0–6)
ERYTHROCYTE [DISTWIDTH] IN BLOOD BY AUTOMATED COUNT: 12.6 % (ref 11.6–15.1)
EST. AVERAGE GLUCOSE BLD GHB EST-MCNC: 108 MG/DL
GFR SERPL CREATININE-BSD FRML MDRD: 95 ML/MIN/1.73SQ M
GLUCOSE SERPL-MCNC: 120 MG/DL (ref 65–140)
HBA1C MFR BLD: 5.4 %
HCT VFR BLD AUTO: 39.5 % (ref 36.5–49.3)
HGB BLD-MCNC: 13.5 G/DL (ref 12–17)
IMM GRANULOCYTES # BLD AUTO: 0.03 THOUSAND/UL (ref 0–0.2)
IMM GRANULOCYTES NFR BLD AUTO: 0 % (ref 0–2)
INR PPP: 0.96 (ref 0.85–1.19)
LYMPHOCYTES # BLD AUTO: 3.26 THOUSANDS/ÂΜL (ref 0.6–4.47)
LYMPHOCYTES NFR BLD AUTO: 35 % (ref 14–44)
MCH RBC QN AUTO: 32.2 PG (ref 26.8–34.3)
MCHC RBC AUTO-ENTMCNC: 34.2 G/DL (ref 31.4–37.4)
MCV RBC AUTO: 94 FL (ref 82–98)
MONOCYTES # BLD AUTO: 1 THOUSAND/ÂΜL (ref 0.17–1.22)
MONOCYTES NFR BLD AUTO: 11 % (ref 4–12)
NEUTROPHILS # BLD AUTO: 4.65 THOUSANDS/ÂΜL (ref 1.85–7.62)
NEUTS SEG NFR BLD AUTO: 49 % (ref 43–75)
NRBC BLD AUTO-RTO: 0 /100 WBCS
P AXIS: 66 DEGREES
PLATELET # BLD AUTO: 374 THOUSANDS/UL (ref 149–390)
PMV BLD AUTO: 10.2 FL (ref 8.9–12.7)
POTASSIUM SERPL-SCNC: 3.5 MMOL/L (ref 3.5–5.3)
PR INTERVAL: 148 MS
PROT SERPL-MCNC: 7.2 G/DL (ref 6.4–8.4)
PROTHROMBIN TIME: 13.1 SECONDS (ref 12.3–15)
QRS AXIS: 39 DEGREES
QRSD INTERVAL: 88 MS
QT INTERVAL: 434 MS
QTC INTERVAL: 423 MS
RBC # BLD AUTO: 4.19 MILLION/UL (ref 3.88–5.62)
SODIUM SERPL-SCNC: 140 MMOL/L (ref 135–147)
T WAVE AXIS: -42 DEGREES
VENTRICULAR RATE: 57 BPM
WBC # BLD AUTO: 9.39 THOUSAND/UL (ref 4.31–10.16)

## 2025-05-15 PROCEDURE — 71046 X-RAY EXAM CHEST 2 VIEWS: CPT

## 2025-05-15 PROCEDURE — 93005 ELECTROCARDIOGRAM TRACING: CPT

## 2025-05-15 PROCEDURE — 95816 EEG AWAKE AND DROWSY: CPT

## 2025-05-15 PROCEDURE — 99285 EMERGENCY DEPT VISIT HI MDM: CPT | Performed by: EMERGENCY MEDICINE

## 2025-05-15 PROCEDURE — 70498 CT ANGIOGRAPHY NECK: CPT

## 2025-05-15 PROCEDURE — 93010 ELECTROCARDIOGRAM REPORT: CPT | Performed by: INTERNAL MEDICINE

## 2025-05-15 PROCEDURE — 36415 COLL VENOUS BLD VENIPUNCTURE: CPT

## 2025-05-15 PROCEDURE — 99284 EMERGENCY DEPT VISIT MOD MDM: CPT

## 2025-05-15 PROCEDURE — 85025 COMPLETE CBC W/AUTO DIFF WBC: CPT

## 2025-05-15 PROCEDURE — 83036 HEMOGLOBIN GLYCOSYLATED A1C: CPT | Performed by: INTERNAL MEDICINE

## 2025-05-15 PROCEDURE — 70496 CT ANGIOGRAPHY HEAD: CPT

## 2025-05-15 PROCEDURE — 84484 ASSAY OF TROPONIN QUANT: CPT

## 2025-05-15 PROCEDURE — 80053 COMPREHEN METABOLIC PANEL: CPT

## 2025-05-15 PROCEDURE — 85610 PROTHROMBIN TIME: CPT

## 2025-05-15 PROCEDURE — 85730 THROMBOPLASTIN TIME PARTIAL: CPT

## 2025-05-15 PROCEDURE — 99223 1ST HOSP IP/OBS HIGH 75: CPT | Performed by: INTERNAL MEDICINE

## 2025-05-15 RX ORDER — ASPIRIN 81 MG/1
81 TABLET ORAL DAILY
Status: DISCONTINUED | OUTPATIENT
Start: 2025-05-15 | End: 2025-05-16 | Stop reason: HOSPADM

## 2025-05-15 RX ORDER — ATORVASTATIN CALCIUM 40 MG/1
40 TABLET, FILM COATED ORAL
Status: DISCONTINUED | OUTPATIENT
Start: 2025-05-15 | End: 2025-05-16 | Stop reason: HOSPADM

## 2025-05-15 RX ORDER — AMLODIPINE BESYLATE 10 MG/1
10 TABLET ORAL DAILY
Status: DISCONTINUED | OUTPATIENT
Start: 2025-05-15 | End: 2025-05-16 | Stop reason: HOSPADM

## 2025-05-15 RX ORDER — ACETAMINOPHEN 325 MG/1
650 TABLET ORAL EVERY 6 HOURS PRN
Status: DISCONTINUED | OUTPATIENT
Start: 2025-05-15 | End: 2025-05-16 | Stop reason: HOSPADM

## 2025-05-15 RX ORDER — ONDANSETRON 2 MG/ML
4 INJECTION INTRAMUSCULAR; INTRAVENOUS EVERY 6 HOURS PRN
Status: DISCONTINUED | OUTPATIENT
Start: 2025-05-15 | End: 2025-05-16 | Stop reason: HOSPADM

## 2025-05-15 RX ADMIN — AMOXICILLIN AND CLAVULANATE POTASSIUM 1 TABLET: 875; 125 TABLET, COATED ORAL at 21:43

## 2025-05-15 RX ADMIN — LISINOPRIL 30 MG: 20 TABLET ORAL at 08:49

## 2025-05-15 RX ADMIN — AMLODIPINE BESYLATE 10 MG: 10 TABLET ORAL at 08:49

## 2025-05-15 RX ADMIN — APIXABAN 5 MG: 5 TABLET, FILM COATED ORAL at 17:01

## 2025-05-15 RX ADMIN — IOHEXOL 85 ML: 350 INJECTION, SOLUTION INTRAVENOUS at 03:24

## 2025-05-15 RX ADMIN — AMOXICILLIN AND CLAVULANATE POTASSIUM 1 TABLET: 875; 125 TABLET, COATED ORAL at 08:52

## 2025-05-15 RX ADMIN — APIXABAN 5 MG: 5 TABLET, FILM COATED ORAL at 08:49

## 2025-05-15 RX ADMIN — ATORVASTATIN CALCIUM 40 MG: 40 TABLET, FILM COATED ORAL at 17:01

## 2025-05-15 RX ADMIN — NICOTINE 1 PATCH: 7 PATCH, EXTENDED RELEASE TRANSDERMAL at 08:49

## 2025-05-15 RX ADMIN — ASPIRIN 81 MG: 81 TABLET, COATED ORAL at 08:49

## 2025-05-15 NOTE — H&P
"H&P - Hospitalist   Name: Roland Campbell 60 y.o. male I MRN: [id number]  Unit/Bed#: [facility] 421-01 I Date of Admission: 5/15/2025   Date of Service: 5/15/2025 I Hospital Day: 0     Assessment & Plan  Transient alteration of awareness  Patient recently admitted here 5/2/2025 - 5/8/2025 with critical limb ischemia of the right lower extremity s/p thrombectomy  Since discharge had noted intermittent episodes of \"blacking out\" as though he was about to syncopize, additionally noted some episodes of confusion/disorientation with blurred vision, slurred speech, dizziness/lightheadedness followed by prolonged sleep.  Denies persisting focal deficits otherwise  Labs during ED evaluation without abnormality compared to prior, CTA head/neck without acute intracranial pathology or large vessel occlusion; persistent ventriculomegaly out of proportion and atherosclerotic changes without occlusion noted  Broad potential etiology for symptoms: Consideration for syncopal event versus partial seizure versus other etiology  Monitor on telemetry, check orthostatics.  Recently had TTE 5/7/2025 revealing EF 60%, no visualized thrombus, no PFO  Given prior CT findings and description of events will check MRI brain, routine EEG  Continue to monitor mental status closely, neurochecks  Critical limb ischemia of right lower extremity (HCC)  Patient recently admitted here 5/2/2025 - 5/8/2025 after developing sudden onset of right lower extremity pain while ambulating, imaging concerning for occlusive thrombus and now s/p right lower extremity angiogram with mechanical thrombectomy 5/3/2025  Embolic workup at prior admission was unrevealing for definitive source  Patient did note transient right foot swelling 3 days prior to admission now resolved, notes some ongoing bilateral lower extremity \"cramping\" with ambulation greater than 2-3 blocks.  Patient with palpable DP/PT pulses bilaterally on admission, motor/sensory intact  Continue ASA, " Eliquis, atorvastatin.  Needs outpatient follow-up with vascular surgery as appropriate.  Essential hypertension  Blood pressure elevated on admission, for now resume amlodipine 10 mg daily and lisinopril 30 mg daily with strict hold parameters.  Monitor blood pressure per protocol  Cigarette nicotine dependence without complication  Counseled again on need for cessation.  Provide nicotine patch while admitted.  Dental caries  S/p dental extraction by OMFS during prior admission 5/5/2025  Discharged on Augmentin, continue through treatment course.  Outpatient follow-up with [facility]/dentist as appropriate      VTE Prophylaxis: Apixaban (Eliquis)  / sequential compression device   Code Status: Level 1 - Full Code   Discussion with family: Patient declines at this time    Anticipated Length of Stay:  Patient will be admitted on an Observation basis with an anticipated length of stay of less than 2 midnights.   Justification for Hospital Stay: Further workup of episodes of transient alteration of awareness with telemetry monitoring, MRI brain, routine EEG, clinical monitoring    Chief Complaint:     Near syncope, transient confusion  History of Present Illness:  Roland Campbell is a 60 y.o. male who was recently admitted here 5/2/2025 - 5/8/2025 with critical limb ischemia of the right lower extremity s/p mechanical thrombectomy; further workup for embolic source during that admission was unrevealing for definitive etiology, and separately patient underwent dental extractions for caries given facial swelling discharged on Augmentin.  Since discharge patient noted at least 2 episodes of near syncope where he would feel a headache followed by dizziness/lightheadedness although denies loss of consciousness or fall/head strike.  He additionally notes episodes of apparent confusion where he describes development of a left frontal headache, followed by episodes of confusion with patient describing 1 episode as being confused as  to where his friend's house was and frequently walking by and around it, noting slurred speech and apparent blurred vision resolving after several minutes however becoming very tired on arrival to friends house and sleeping for 10-12 hours.  He denied any word finding difficulties or focal weakness during these events.  He did note cramping pain while walking upwards of 2-3 blocks, and did report isolated episode of right foot swelling 3 days prior to this presentation but states this is since resolved.  He denied any fever/chills, drug use, chest pain/pressure, shortness of breath, abdominal pain/nausea/vomiting/diarrhea, dysuria, or other systemic symptoms.  This evening due to ongoing frequency of episodes he presented for evaluation.    During ED evaluation broad workup was initiated with labs without marked Rela compared to prior and a CTA head/neck was performed negative for acute intracranial pathology or large vessel occlusion however did reveal persistent ventriculomegaly out of proportion to expected and atherosclerotic changes.  Given episodes of transient alteration of awareness of uncertain etiology patient referred to hospitalist service for admission for further workup/management of this as above.    Review of Systems:    Constitutional:  Denies fever or chills   Eyes:  Denies change in visual acuity but reported transient blurred vision  HENT:  Denies nasal congestion or sore throat   Respiratory:  Denies cough or shortness of breath   Cardiovascular:  Denies chest pain or edema   GI:  Denies abdominal pain, nausea, vomiting, bloody stools or diarrhea   :  Denies dysuria   Musculoskeletal:  Denies back pain or joint pain   Integument:  Denies rash   Neurologic:  Denies headache, focal weakness or sensory changes but reports dizziness/lightheadedness  Endocrine:  Denies polyuria or polydipsia   Lymphatic:  Denies swollen glands   Psychiatric:  Denies depression or anxiety reports transient  "confusion    Past Medical and Surgical History:   Past Medical History:   Diagnosis Date    Hypertension      Past Surgical History:   Procedure Laterality Date    INCISION AND DRAINAGE INTRA ORAL ABSCESS Left 5/5/2025    Procedure: INCISION AND DRAINAGE  (I&D) WOUND ORAL;  Surgeon: Dandre St DMD;  Location: [facility];  Service: Maxillofacial    IR LOWER EXTREMITY ANGIOGRAM  5/3/2025    MULTIPLE TOOTH EXTRACTIONS Bilateral 5/5/2025    Procedure: EXTRACTION TEETH #4, 14, 15;  Surgeon: Dandre St DMD;  Location: [facility];  Service: Maxillofacial    TN RPR 1ST INGUN HRNA AGE 5 YRS/> REDUCIBLE Right 5/18/2021    Procedure: REPAIR HERNIA INGUINAL;  Surgeon: Darin Mendoza DO;  Location: [facility];  Service: General       Meds/Allergies:    Medications Prior to Admission:     amLODIPine (NORVASC) 10 mg tablet    amoxicillin-clavulanate (AUGMENTIN) 875-125 mg per tablet    apixaban (Eliquis) 5 mg    aspirin (ECOTRIN LOW STRENGTH) 81 mg EC tablet    atorvastatin (LIPITOR) 40 mg tablet    lisinopril (ZESTRIL) 30 mg tablet    Allergies: Allergies[1]  History:  Marital Status: Single     Substance Use History:   Social History     Substance and Sexual Activity   Alcohol Use Yes     Tobacco Use History[2]  Social History     Substance and Sexual Activity   Drug Use Never       Family History:  Family History   Problem Relation Age of Onset    No Known Problems Mother     No Known Problems Father        Physical Exam:     Vitals:   Blood Pressure: 164/77 (05/15/25 0523)  Pulse: 68 (05/15/25 0523)  Temperature: 98 °F (36.7 °C) (05/15/25 0523)  Temp Source: Oral (05/15/25 0523)  Respirations: 20 (05/15/25 0523)  Height: 5' 8\" (172.7 cm) (05/15/25 0523)  Weight - Scale: 81.6 kg (179 lb 14.3 oz) (05/15/25 0523)  SpO2: 98 % (05/15/25 0523)    Constitutional:  Well developed, well nourished, no acute distress, non-toxic appearance   Eyes:  PERRL, conjunctiva normal   HENT:  Atraumatic, external ears normal, nose normal, " oropharynx moist, no pharyngeal exudates. Neck- normal range of motion, no tenderness, supple   Respiratory:  No respiratory distress, normal breath sounds, no rales, no wheezing   Cardiovascular:  Normal rate, normal rhythm, no murmurs, no gallops, no rubs   GI:  Soft, nondistended, normal bowel sounds, nontender, no organomegaly, no mass, no rebound, no guarding   :  No costovertebral angle tenderness   Musculoskeletal:  No edema, no tenderness, no deformities. Back- no tenderness  Integument:  Well hydrated, no rash   Lymphatic:  No lymphadenopathy noted   Neurologic:  Alert &awake, communicative, CN 2-12 normal, normal motor function, normal sensory function, no focal deficits noted   Psychiatric:  Speech and behavior appropriate       Lab Results: I have reviewed all lab data below:    Results from last 7 days   Lab Units 05/15/25  0205   WBC Thousand/uL 9.39   HEMOGLOBIN g/dL 13.5   HEMATOCRIT % 39.5   PLATELETS Thousands/uL 374   SEGS PCT % 49   LYMPHO PCT % 35   MONO PCT % 11   EOS PCT % 4     Results from last 7 days   Lab Units 05/15/25  0205   SODIUM mmol/L 140   POTASSIUM mmol/L 3.5   CHLORIDE mmol/L 106   CO2 mmol/L 26   BUN mg/dL 21   CREATININE mg/dL 0.84   ANION GAP mmol/L 8   CALCIUM mg/dL 9.6   ALBUMIN g/dL 4.2   TOTAL BILIRUBIN mg/dL 0.32   ALK PHOS U/L 69   ALT U/L 22   AST U/L 13   GLUCOSE RANDOM mg/dL 120     Results from last 7 days   Lab Units 05/15/25  0205   INR  0.96         Results from last 7 days   Lab Units 05/15/25  0205   HEMOGLOBIN A1C % 5.4             EKG: Personally reviewed, sinus bradycardia HR 57, inferolateral ST-T wave abnormality; EKG not significantly changed compared to priors    Imaging: Results Review Statement: I reviewed radiology reports from this admission including: CTA head and neck.    CTA head and neck with and without contrast  Result Date: 5/15/2025  Narrative: CTA NECK AND BRAIN WITH AND WITHOUT CONTRAST INDICATION: Syncope, altered mental status. Two  syncopal episodes in the past 2 days, blurry vision, forgetful, intermittent confusion, dizziness, lightheadedness. Recent hospital admission for right lower extremity ischemia related to arterial blood clot, status post thrombectomy. On Eliquis. COMPARISON:   Noncontrast CT of the brain dated May 3, 2025. TECHNIQUE:  Routine CT imaging of the Brain without contrast.Post contrast imaging was performed after administration of iodinated contrast through the neck and brain. Post contrast axial 0.625 mm images timed to opacify the arterial system.  3D rendering was performed on an independent workstation.   MIP reconstructions performed. Coronal and sagittal reconstructions were performed of the non contrast portion of the brain. Radiation dose length product (DLP) for this visit:  1466 mGy-cm. .  This examination, like all CT scans performed in the [facility], was performed utilizing techniques to minimize radiation dose exposure, including the use of iterative reconstruction and automated exposure control. IV Contrast:  85 mL of iohexol (OMNIPAQUE) IMAGE QUALITY:   Diagnostic FINDINGS: NONCONTRAST BRAIN PARENCHYMA: Decreased attenuation is noted in periventricular and subcortical white matter demonstrating an appearance that is statistically most likely to represent mild microangiopathic change; this appearance is similar when compared to most recent prior examination. No definite CT signs of acute infarction; MRI is more sensitive.  No intracranial mass, mass effect or midline shift.  No acute parenchymal hemorrhage. VENTRICLES AND EXTRA-AXIAL SPACES: The ventricles appear similar in size and position. There is no midline shift. Once again, the degree of ventricular dilatation is somewhat out of proportion to the degree of sulcal prominence, similar to May 3, 2025. VISUALIZED ORBITS: Normal. PARANASAL SINUSES: Moderate mucosal thickening left maxillary sinus. CTA NECK ARCH AND GREAT VESSELS: Minimal  atherosclerotic changes with no severe stenosis. VERTEBRAL ARTERIES: Patent extracranial segments. RIGHT CAROTID: Mild plaque at the bifurcation. No stenosis.    No dissection. LEFT CAROTID: Mild plaque at the bifurcation. No stenosis.    No dissection. NASCET criteria was used to determine the degree of internal carotid artery diameter stenosis. CTA BRAIN: INTERNAL CAROTID ARTERIES: Mild to moderate calcified plaque bilaterally results in mild to moderate stenosis bilaterally. No occlusion. ANTERIOR CEREBRAL ARTERY CIRCULATION:  No stenosis or occlusion. MIDDLE CEREBRAL ARTERY CIRCULATION: Mild atherosclerotic irregularity. No significant stenosis. No large vessel occlusion. DISTAL VERTEBRAL ARTERIES:  No stenosis or occlusion. BASILAR ARTERY:  No stenosis or occlusion. POSTERIOR CEREBRAL ARTERIES: No stenosis or occlusion. There is fetal origin of the right posterior cerebral artery with a small right P1 evident. Patent left posterior communicating artery. VENOUS STRUCTURES:  Normal. NON VASCULAR ANATOMY BONY STRUCTURES:  No acute osseous abnormality. Spinal degenerative changes. SOFT TISSUES OF THE NECK:  Normal. THORACIC INLET: Mild emphysema.     Impression: CT Brain: No acute intracranial hemorrhage. Mild microangiopathy. No significant interval change. Once again, the degree of ventricular dilatation is somewhat out of proportion to the degree of sulcal prominence, similar to May 3, 2025. This may be seen in normal pressure hydrocephalus. Clinical correlation is recommended.  If further evaluation is indicated, MRI with diffusion weighted imaging could be performed, if there are no contraindications. CT Angiography: Atherosclerotic changes, as described above. Please see discussion. No evidence of large vessel occlusion. Workstation performed: [id number]     KAREN  Result Date: 5/7/2025  Narrative:   Left Ventricle: Left ventricular cavity size is normal. Wall thickness is moderately increased. The left  ventricular ejection fraction is 60% by visual estimation. Systolic function is normal. Although no diagnostic regional wall motion abnormality was identified, this possibility cannot be completely excluded on the basis of this study.   Right Ventricle: Right ventricular cavity size is normal. Systolic function is normal.   Left Atrium: There is no thrombus.   Atrial Septum: No patent foramen ovale detected, confirmed at rest using color doppler and using agitated saline contrast, confirmed by provocation with abdominal compression, using agitated saline contrast.   Left Atrial Appendage: There is no thrombus.     US bedside procedure  Result Date: 5/5/2025  Narrative: [id number]    CTA chest wo w contrast  Result Date: 5/5/2025  Narrative: CT ANGIOGRAM OF THE CHEST, WITH AND WITHOUT IV CONTRAST INDICATION:   Cardioembolic workup in setting of acute RLE ischmia now s/p TP trunk thrombectomy. COMPARISON: CTA of the abdominal aorta and lower extremities May 3, 2025, report of CTA of the chest from [facility] January 31, 2023 TECHNIQUE:  CT angiogram examination of the chest was performed according to standard protocol. Contrast-enhanced images were ordered. No precontrast images were obtained. This examination, like all CT scans performed in the [facility], was performed utilizing techniques to minimize radiation dose exposure, including the use of iterative reconstruction and automated exposure control.  3D reconstructions were performed an independent workstation, and are supplied for review.  Rad dose 961.51 mGy-cm. IV Contrast:  80 mL of iohexol FINDINGS: AORTA/ARTERIAL VASCULATURE: There is a linear low-attenuation area at the level of the descending thoracic aorta 4/252, 602/98 which could represent an artifact related to mixing of contrast, small thrombus versus minimal aortic injury if there has been history of trauma. Short interval follow-up  with CTA chest is recommended in 3 to 5 days interval to assess if this finding persists. There is no aortic dissection or intramural hematoma. There is no aortic aneurysm. Ascending thoracic aorta measures 32 mm with the descending thoracic aorta measuring 33 mm at the level of the main pulmonary artery. Atherosclerotic disease of the thoracic aorta is seen. OTHER FINDINGS: LUNGS: Centrilobular emphysema. Dependent atelectasis in the lung bases.  There is no tracheal or endobronchial lesion. PLEURA:  Unremarkable. HEART/PULMONARY ARTERIAL TREE:  Heart is unremarkable for patient's age. MEDIASTINUM AND CY: Soft tissue nodularity is demonstrated in the anterior mediastinum interspersed with fat. This most likely reflects thymic hyperplasia. This was described on the prior exam which is not available for direct comparison at this time. CHEST WALL AND LOWER NECK:   Unremarkable. VISUALIZED STRUCTURES IN THE UPPER ABDOMEN: A wedge-shaped area of low-attenuation is demonstrated at the level of the spleen 2/117 which suggests a splenic infarct in view of patient's history of thromboembolic disease. This was not visualized on the previous exam. Hepatic cyst. Left upper pole renal cyst. VISUALIZED OSSEOUS STRUCTURES:  No acute fracture or destructive osseous lesion.     Impression: 1. Focal linear area of low-attenuation in the descending thoracic aorta with differential diagnoses including artifact related to mixing of blood, small amount of thrombus or minimal aortic injury. Short interval follow-up with CT chest is recommended in 3 to 5 days interval. 2. 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 3. Soft tissue density in the anterior mediastinum most likely reflecting thymic hyperplasia. This was described on the prior exam although images are not available for direct comparison. 4. Adrenal hyperplasia. The study was marked in EPIC for immediate notification. Workstation performed: [id number]     XR  mandible panorex ([place] only)  Result Date: 5/4/2025  Narrative: MANDIBLE - PANOREX INDICATION:   Tooth ache. COMPARISON:  None VIEWS:  XR MANDIBLE PANOREX ([place] ONLY) FINDINGS: There is no fracture or pathologic bone lesion. The temporomandibular joints appear grossly intact. Teeth #2 and 3 in the left maxilla are fractured off at the gumline/absent with retained root structures noted with suggestion of possible mild lucency around the root structures. There also appears to be some minimal lucency around the root structure of  the first tooth and there is a filling in that molar. There appears to be a cavity near the gumline in tooth #13. The remaining teeth appear grossly within normal limits. 2 of the right maxillary molars and several of the left mandibular teeth are completely absent.     Impression: Dental disease as detailed above. Jaw appears intact Workstation performed: [id number]     IR lower extremity angiogram  Result Date: 5/3/2025  Narrative: IR right lower extremity arteriography and mechanical thrombectomy PROCEDURE SUMMARY: 1.  Left common femoral artery access with ultrasound guidance 2.  Right lower extremity arteriography 3.  Mechanical thrombectomy of the tibioperoneal trunk followed by 3 mm balloon angioplasty. 4.  Left common femoral arteriotomy closure using Vascade PROCEDURAL PERSONNEL: Attending physician(s): Nikki Martins MD Resident physician(s): None Advanced practice provider(s): None INDICATION: right ALI (RA) w/ pop occlusion acute on chronic dz). Right lower extremity acute limb ischemia COMPLICATIONS: No immediate complications. ESTIMATED BLOOD LOSS (mL): 101-200 CONTRAST: 60 mL of iodixanol RADIATION DOSE: Fluoroscopy time: 9.2 minutes Reference air kerma: 38 mGy ANESTHESIA/SEDATION: Level of anesthesia/sedation: Moderate sedation (conscious sedation) Anesthesia/sedation administered by: Independent trained observer under attending supervision with continuous monitoring of  the patient's level of consciousness and physiologic status Total intra-service sedation time (minutes): 60 PROCEDURE DETAILS: Informed consent for the procedure including risks, benefits and alternatives was obtained and time-out was performed prior to the procedure. The site was prepared and draped using maximal sterile barrier technique including cutaneous antisepsis. Local anesthesia was administered. The vessel was sonographically evaluated and judged to be patent. Real time ultrasound was used to visualize needle entry into the vessel and a permanent image was stored. A six-point sheath was placed. Vessel accessed: Left common femoral artery Access technique: Micropuncture set with 21 gauge needle An Omni Flush catheter was advanced up and over the bifurcation over a wire to the contralateral external iliac artery. Right lower extremity arteriography runoff was performed. Based on the findings, the 5-Macanese sheath was upsized to 6 Macanese which was advanced to the SFA. Mechanical thrombectomy was performed along the below-knee popliteal artery and tibioperoneal trunk utilizing [email or web address] CAT 6 clot aspiration system. Interval arteriography was performed. Based on the findings, angioplasty was performed along the same segment utilizing a 3 mm x 6 cm [place] balloon. Completion arteriography was performed. Based on the findings, all catheters and wires were removed intact.  Vascular closure device was used for arterial closure.  Hemostasis was achieved.  The patient left the department in unchanged condition. FINDINGS: 1.  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 2.  After mechanical thrombectomy and balloon angioplasty of the tibioperoneal trunk, completion arteriography shows restored inline flow through a robust PTA. Treatment of chronic AT occlusion and residual thrombus  in the peroneal artery was deferred.     Impression: 1.  Successful mechanical thrombectomy of thrombotic occlusion of the tibioperoneal trunk with restored inline flow to the foot. Recommend work-up to look for an embolic source. Workstation performed: [id number]     Echo complete w/ contrast if indicated  Result Date: 5/3/2025  Narrative:   Left Ventricle: Left ventricular cavity size is normal. Wall thickness is mildly increased. The left ventricular ejection fraction is 60%. Systolic function is normal. Wall motion is normal. Diastolic function is mildly abnormal, consistent with grade I (abnormal) relaxation.   Left Atrium: The atrium is mildly dilated (35-41 mL/m2).     CT head wo contrast  Result Date: 5/3/2025  Narrative: CT BRAIN - WITHOUT CONTRAST INDICATION:   eval for stroke; symptoms 3 weeks ago. COMPARISON:  None available. TECHNIQUE:  CT examination of the brain was performed.  Multiplanar 2D reformatted images were created from the source data. Radiation dose length product (DLP) for this visit:  897.41 mGy-cm. .  This examination, like all CT scans performed in the [facility], was performed utilizing techniques to minimize radiation dose exposure, including the use of iterative reconstruction and automated exposure control. IMAGE QUALITY:  Diagnostic. FINDINGS: BRAIN PARENCHYMA AND EXTRA-AXIAL SPACES: There is mild  diminished attenuation involving white matter. There are no other areas of abnormal attenuation evident within brain parenchyma.  There is no mass, abnormal extra-axial fluid collection, or intracranial hemorrhage evident.  There is no acute or subacute infarction evident. VENTRICLES: There is mild to moderate enlargement of the lateral and third ventricles with effacement of cortical sulci at the convexities. INCLUDED PARANASAL SINUSES AND TEMPORAL BONES: The included portions of the paranasal sinuses and temporal bones appear to be well aerated. There is no middle or  inner ear abnormality evident. Orbits: There is no significant abnormality evident. CALVARIUM AND EXTRACRANIAL SOFT TISSUES:  No significant abnormality evident.     Impression: Diminished attenuation involving white matter described above is a nonspecific finding most often due to chronic small vessel white matter ischemic disease; the degree of which greater than typical for age. Disproportionate enlargement of the third and lateral ventricles relative to size of cortical sulci at the vertex is a nonspecific finding which could relate to asymmetric central white matter volume loss or normal pressure hydrocephalus. Mariano Hernadez M.D., FACR Senior Member, [employer] Workstation performed: [id number]     CTA abdominal w run off w wo contrast  Result Date: 5/3/2025  Narrative: CT ANGIOGRAM OF THE AORTA AND LOWER EXTREMITIES WITH IV CONTRAST INDICATION: Cold right foot COMPARISON: None. TECHNIQUE: CT angiogram examination of the abdomen, pelvis, and lower extremities was performed according to standard protocol with intravenous contrast. This examination, like all CT scans performed in the [facility], was performed utilizing techniques to minimize radiation dose exposure, including the use of iterative reconstruction and automated exposure control. 3D reconstructions were performed an independent workstation, and are supplied for review. Rad dose 2583 mGy-cm. IV Contrast: 100 mL of iohexol Enteric Contrast: Not administered. FINDINGS: VESSELS: Mild scattered calcific atherosclerosis of the abdominal aorta, iliac arteries, and lower extremity arteries. No abdominal aortic aneurysm or dissection. No significant flow-limiting stenosis of the abdominal aorta or proximal abdominal/pelvic branches. Celiac artery, superior mesenteric artery, bilateral renal arteries, inferior mesenteric artery, common/internal/external iliac arteries, and common femoral arteries are widely patent. Mild  calcific atherosclerosis in the left common femoral artery is seen without significant stenosis. Left profunda femoral artery and proximal branches are patent. The proximal to mid left superficial femoral artery appear patent with normal course and caliber. Very short segment of dissection versus vessel bifurcation involving the distal left superficial femoral artery is seen. The left popliteal artery, trifurcation, anterior tibial artery, posterior tibial artery, and peroneal artery appear grossly patent. Short tapering of the left peroneal artery noted. The left dorsalis pedis artery and posterior tibial artery appear widely patent in the left ankle and tarsal arch regions. Mild calcific atherosclerosis of the right common femoral artery, proximal superficial femoral artery, and proximal profundofemoral artery without significant stenosis. Remainder of the right profunda femoris and superficial femoral arteries appear widely patent. Short segment of mild to moderate luminal narrowing of the distal right superficial femoral artery due to soft plaque is seen. The proximal to mid right popliteal artery is widely patent. There is abrupt complete occlusion of the distal right popliteal artery with reconstitution of the right posterior tibial artery more inferiorly. The right dorsalis pedis and posterior tibial arteries appear patent in the right ankle region. Abrupt termination of the posterior tibial artery in the hindfoot region. Nonvisualization of the right tarsal arch. Right anterior tibial artery/dorsalis pedis artery is not visualized in the right ankle region. OTHER FINDINGS ABDOMEN LOWER CHEST: Bibasilar atelectasis. No pleural or pericardial effusions. LIVER/BILIARY TREE: A few small subcentimeter hypoattenuating lesions, too small to characterize but statistically likely benign, which do not require follow-up (ACR White Paper 2017). No suspicious mass. Normal hepatic contours. No biliary dilation.  GALLBLADDER: No calcified gallstones. No pericholecystic inflammatory change. SPLEEN: Unremarkable. PANCREAS: Unremarkable. ADRENAL GLANDS: Unremarkable. KIDNEYS/URETERS: No hydronephrosis or urinary tract calculi. Subcentimeter hypoattenuating renal lesion(s), too small to characterize but statistically likely benign, which do not warrant follow-up (Radiology June 2019). PELVIS REPRODUCTIVE ORGANS: Unremarkable for patient's age. URINARY BLADDER: Mildly distended and grossly unremarkable. ADDITIONAL ABDOMINAL AND PELVIC STRUCTURES STOMACH AND BOWEL: Stomach is contracted limiting evaluation. No gross intraluminal mass. The small and large bowel loops are normal in course and caliber without evidence for obstruction or inflammation. Terminal ileum and appendix are grossly unremarkable. APPENDIX: No findings to suggest appendicitis. ABDOMINOPELVIC CAVITY: No ascites. No pneumoperitoneum. No lymphadenopathy. ABDOMINAL WALL/INGUINAL REGIONS: Unremarkable. BONES: No acute fracture or suspicious osseous lesion. Total right hip prosthesis in normal anatomical alignment. Old healed right lateral 10th rib fracture deformity with callus formation.     Impression: 1.  No abdominal aortic aneurysm or dissection. Scattered calcific atherosclerosis of the abdominal aorta, iliac arteries, and lower extremity arteries as described above. 2.  Very short segment of dissection versus vessel bifurcation involving the distal left superficial femoral artery is seen. 3.  Short segment of mild to moderate luminal narrowing of the distal right superficial femoral artery due to soft plaque is seen. 4.  There is abrupt complete occlusion of the distal right popliteal artery with reconstitution of the right posterior tibial artery more inferiorly. Abrupt termination of the right posterior tibial artery in the hindfoot region consistent with age-indeterminate occlusion. Nonvisualization of the right tarsal arch. Right anterior tibial  artery/dorsalis pedis artery is not visualized in the right ankle region. 5.  No acute intra-abdominal/pelvic abnormalities noted with findings detailed above. Workstation performed: [id number]         ** Please Note: Dragon 360 Dictation voice to text software was used in the creation of this document. **         [1]   Allergies  Allergen Reactions    Gabapentin Other (See Comments)     Suicidal Ideations    Pollen Extract Allergic Rhinitis   [2]   Social History  Tobacco Use   Smoking Status Some Days   Smokeless Tobacco Never

## 2025-05-15 NOTE — ASSESSMENT & PLAN NOTE
"Patient recently admitted here 5/2/2025 - 5/8/2025 after developing sudden onset of right lower extremity pain while ambulating, imaging concerning for occlusive thrombus and now s/p right lower extremity angiogram with mechanical thrombectomy 5/3/2025  Embolic workup at prior admission was unrevealing for definitive source  Patient did note transient right foot swelling 3 days prior to admission now resolved, notes some ongoing bilateral lower extremity \"cramping\" with ambulation greater than 2-3 blocks.  Patient with palpable DP/PT pulses bilaterally on admission, motor/sensory intact  Continue ASA, Eliquis, atorvastatin.  Needs outpatient follow-up with vascular surgery as appropriate.  "

## 2025-05-15 NOTE — PLAN OF CARE
Problem: PAIN - ADULT  Goal: Verbalizes/displays adequate comfort level or baseline comfort level  Description: Interventions:  - Encourage patient to monitor pain and request assistance  - Assess pain using appropriate pain scale  - Administer analgesics as ordered based on type and severity of pain and evaluate response  - Implement non-pharmacological measures as appropriate and evaluate response  - Consider cultural and social influences on pain and pain management  - Notify physician/advanced practitioner if interventions unsuccessful or patient reports new pain  - Educate patient/family on pain management process including their role and importance of  reporting pain   - Provide non-pharmacologic/complimentary pain relief interventions  Outcome: Progressing     Problem: INFECTION - ADULT  Goal: Absence or prevention of progression during hospitalization  Description: INTERVENTIONS:  - Assess and monitor for signs and symptoms of infection  - Monitor lab/diagnostic results  - Monitor all insertion sites, i.e. indwelling lines, tubes, and drains  - Monitor endotracheal if appropriate and nasal secretions for changes in amount and color  - Sterling appropriate cooling/warming therapies per order  - Administer medications as ordered  - Instruct and encourage patient and family to use good hand hygiene technique  - Identify and instruct in appropriate isolation precautions for identified infection/condition  Outcome: Progressing  Goal: Absence of fever/infection during neutropenic period  Description: INTERVENTIONS:  - Monitor WBC  - Perform strict hand hygiene  - Limit to healthy visitors only  - No plants, dried, fresh or silk flowers with greer in patient room  Outcome: Progressing     Problem: SAFETY ADULT  Goal: Patient will remain free of falls  Description: INTERVENTIONS:  - Educate patient/family on patient safety including physical limitations  - Instruct patient to call for assistance with activity   -  Consider consulting OT/PT to assist with strengthening/mobility based on AM PAC & JH-HLM score  - Consult OT/PT to assist with strengthening/mobility   - Keep Call bell within reach  - Keep bed low and locked with side rails adjusted as appropriate  - Keep care items and personal belongings within reach  - Initiate and maintain comfort rounds  - Make Fall Risk Sign visible to staff  - Offer Toileting every  Hours, in advance of need  - Initiate/Maintain alarm  - Obtain necessary fall risk management equipment:   - Apply yellow socks and bracelet for high fall risk patients  - Consider moving patient to room near nurses station  Outcome: Progressing  Goal: Maintain or return to baseline ADL function  Description: INTERVENTIONS:  -  Assess patient's ability to carry out ADLs; assess patient's baseline for ADL function and identify physical deficits which impact ability to perform ADLs (bathing, care of mouth/teeth, toileting, grooming, dressing, etc.)  - Assess/evaluate cause of self-care deficits   - Assess range of motion  - Assess patient's mobility; develop plan if impaired  - Assess patient's need for assistive devices and provide as appropriate  - Encourage maximum independence but intervene and supervise when necessary  - Involve family in performance of ADLs  - Assess for home care needs following discharge   - Consider OT consult to assist with ADL evaluation and planning for discharge  - Provide patient education as appropriate  - Monitor functional capacity and physical performance, use of AM PAC & JH-HLM   - Monitor gait, balance and fatigue with ambulation    Outcome: Progressing  Goal: Maintains/Returns to pre admission functional level  Description: INTERVENTIONS:  - Perform AM-PAC 6 Click Basic Mobility/ Daily Activity assessment daily.  - Set and communicate daily mobility goal to care team and patient/family/caregiver.   - Collaborate with rehabilitation services on mobility goals if consulted  -  Perform Range of Motion  times a day.  - Reposition patient every  hours.  - Dangle patient  times a day  - Stand patient  times a day  - Ambulate patient  times a day  - Out of bed to chair times a day   - Out of bed for meals  times a day  - Out of bed for toileting  - Record patient progress and toleration of activity level   Outcome: Progressing

## 2025-05-15 NOTE — PROGRESS NOTES
"Progress Note - Hospitalist   Name: Roland Campbell 60 y.o. male I MRN: [id number]  Unit/Bed#: [facility] 421-01 I Date of Admission: 5/15/2025   Date of Service: 5/15/2025 I Hospital Day: 0     Assessment & Plan  Transient alteration of awareness  Patient recently admitted here 5/2/2025 - 5/8/2025 with critical limb ischemia of the right lower extremity s/p thrombectomy  Since discharge had noted intermittent episodes of \"blacking out\" as though he was about to syncopize, additionally noted some episodes of confusion/disorientation with blurred vision, slurred speech, dizziness/lightheadedness followed by prolonged sleep.  Denies persisting focal deficits otherwise  Labs during ED evaluation without abnormality compared to prior, CTA head/neck without acute intracranial pathology or large vessel occlusion; persistent ventriculomegaly. Concerning for NPH was noted. Since no symptoms at the time patient was advised to follow with neuro as outpatient.   Orthostatics were negative  Recently had TTE 5/7/2025 revealing EF 60%, no visualized thrombus, no PFO  Monitor on telemetry, so far the telemetry monitoring was unremarkable will continue.  Possible arrhythmias  CT head shows possible NPH. Discussed with neurosurgery and they said they do not see NPH inpaient. Will put in outpatient consult for Neurosurgery  Will get MRI and routine EEG which still pending  Continue to monitor mental status closely, neurochecks  Critical limb ischemia of right lower extremity (HCC)  Patient recently admitted here 5/2/2025 - 5/8/2025 after developing sudden onset of right lower extremity pain while ambulating, imaging concerning for occlusive thrombus and now s/p right lower extremity angiogram with mechanical thrombectomy 5/3/2025  Embolic workup at prior admission was unrevealing for definitive source  Patient did note transient right foot swelling 3 days prior to admission now resolved, notes some ongoing bilateral lower extremity " "\"cramping\" with ambulation greater than 2-3 blocks.  Patient with palpable DP/PT pulses bilaterally on admission, motor/sensory intact  Continue ASA, Eliquis, atorvastatin.  Needs outpatient follow-up with vascular surgery as appropriate.  Essential hypertension  Blood pressure elevated on admission, for now resume amlodipine 10 mg daily and lisinopril 30 mg daily with strict hold parameters.  Monitor blood pressure per protocol  Cigarette nicotine dependence without complication  Counseled again on need for cessation.  Provide nicotine patch while admitted.  Dental caries  S/p dental extraction by OMFS during prior admission 5/5/2025  Discharged on Augmentin, continue through treatment course.  Outpatient follow-up with OMFS/dentist as appropriate    VTE Pharmacologic Prophylaxis: VTE Score: 4 Moderate Risk (Score 3-4) - Pharmacological DVT Prophylaxis Ordered: enoxaparin (Lovenox).    Mobility:   Basic Mobility Inpatient Raw Score: 24  JH-HLM Goal: 8: Walk 250 feet or more  JH-HLM Achieved: 6: Walk 10 steps or more  JH-HLM Goal achieved. Continue to encourage appropriate mobility.    Patient Centered Rounds: I performed bedside rounds with nursing staff today.   Discussions with Specialists or Other Care Team Provider: None    Education and Discussions with Family / Patient: Updated  (brother) at bedside.    Current Length of Stay: 0 day(s)  Current Patient Status: Observation   Certification Statement: The patient will continue to require additional inpatient hospital stay due to AMS  Discharge Plan: Anticipate discharge in 24-48 hrs to home.    Code Status: Level 1 - Full Code    Subjective   Patient said that he had passed out while ambulating suddenly.  Did not have any recent standing up.  Orthostatics were unremarkable.  Continue to monitor for possible stroke versus      Objective :  Temp:  [97.6 °F (36.4 °C)-98 °F (36.7 °C)] 97.6 °F (36.4 °C)  HR:  [55-68] 63  BP: (151-176)/(74-85) " 158/74  Resp:  [18-20] 20  SpO2:  [95 %-100 %] 99 %  O2 Device: None (Room air)    Body mass index is 27.35 kg/m².     Input and Output Summary (last 24 hours):     Intake/Output Summary (Last 24 hours) at 5/15/2025 1513  Last data filed at 5/15/2025 1144  Gross per 24 hour   Intake 500 ml   Output --   Net 500 ml       Physical Exam  Vitals and nursing note reviewed.   Constitutional:       Appearance: He is well-developed and normal weight.   HENT:      Head: Normocephalic and atraumatic.      Nose: Nose normal.      Mouth/Throat:      Mouth: Mucous membranes are moist.     Eyes:      Conjunctiva/sclera: Conjunctivae normal.       Cardiovascular:      Rate and Rhythm: Normal rate and regular rhythm.      Heart sounds: Normal heart sounds. No murmur heard.  Pulmonary:      Effort: Pulmonary effort is normal. No respiratory distress.      Breath sounds: Normal breath sounds.   Abdominal:      General: Abdomen is flat.      Palpations: Abdomen is soft.      Tenderness: There is no abdominal tenderness.     Musculoskeletal:         General: No swelling.      Cervical back: Neck supple.      Right lower leg: No edema.      Left lower leg: No edema.     Skin:     General: Skin is warm and dry.      Capillary Refill: Capillary refill takes less than 2 seconds.     Neurological:      General: No focal deficit present.      Mental Status: He is alert and oriented to person, place, and time. Mental status is at baseline.     Psychiatric:         Mood and Affect: Mood normal.           Lines/Drains:        Telemetry:  Telemetry Orders (From admission, onward)               24 Hour Telemetry Monitoring  Continuous x 24 Hours (Telem)        Question:  Reason for 24 Hour Telemetry  Answer:  TIA/Suspected CVA/ Confirmed CVA                     Telemetry Reviewed: Normal Sinus Rhythm  Indication for Continued Telemetry Use: Syncope               Lab Results: I have reviewed the following results:   Results from last 7 days   Lab  Units 05/15/25  0205   WBC Thousand/uL 9.39   HEMOGLOBIN g/dL 13.5   HEMATOCRIT % 39.5   PLATELETS Thousands/uL 374   SEGS PCT % 49   LYMPHO PCT % 35   MONO PCT % 11   EOS PCT % 4     Results from last 7 days   Lab Units 05/15/25  0205   SODIUM mmol/L 140   POTASSIUM mmol/L 3.5   CHLORIDE mmol/L 106   CO2 mmol/L 26   BUN mg/dL 21   CREATININE mg/dL 0.84   ANION GAP mmol/L 8   CALCIUM mg/dL 9.6   ALBUMIN g/dL 4.2   TOTAL BILIRUBIN mg/dL 0.32   ALK PHOS U/L 69   ALT U/L 22   AST U/L 13   GLUCOSE RANDOM mg/dL 120     Results from last 7 days   Lab Units 05/15/25  0205   INR  0.96         Results from last 7 days   Lab Units 05/15/25  0205   HEMOGLOBIN A1C % 5.4           Recent Cultures (last 7 days):         Imaging Results Review: I personally reviewed the following image studies/reports in PACS and discussed pertinent findings with Radiology: chest xray. My interpretation of the radiology images/reports is: None.  Other Study Results Review: EKG was reviewed.     Last 24 Hours Medication List:     Current Facility-Administered Medications:     acetaminophen (TYLENOL) tablet 650 mg, Q6H PRN    amLODIPine (NORVASC) tablet 10 mg, Daily    amoxicillin-clavulanate (AUGMENTIN) 875-125 mg per tablet 1 tablet, Q12H LAUREN    apixaban (ELIQUIS) tablet 5 mg, BID    aspirin (ECOTRIN LOW STRENGTH) EC tablet 81 mg, Daily    atorvastatin (LIPITOR) tablet 40 mg, Daily With Dinner    lisinopril (ZESTRIL) tablet 30 mg, Daily    nicotine (NICODERM CQ) 7 mg/24hr TD 24 hr patch 1 patch, Daily    ondansetron (ZOFRAN) injection 4 mg, Q6H PRN    Administrative Statements   Today, Patient Was Seen By: Darin Gunderson MD  I have spent a total time of 38 minutes in caring for this patient on the day of the visit/encounter including Diagnostic results, Prognosis, Risks and benefits of tx options, Instructions for management, Patient and family education, Importance of tx compliance, Risk factor reductions, Impressions, Counseling /  Coordination of care, Documenting in the medical record, Reviewing/placing orders in the medical record (including tests, medications, and/or procedures), Obtaining or reviewing history  , and Communicating with other healthcare professionals .    **Please Note: This note may have been constructed using a voice recognition system.**

## 2025-05-15 NOTE — ASSESSMENT & PLAN NOTE
Blood pressure elevated on admission, for now resume amlodipine 10 mg daily and lisinopril 30 mg daily with strict hold parameters.  Monitor blood pressure per protocol

## 2025-05-15 NOTE — ED ATTENDING ATTESTATION
5/15/2025  IPaul MD, saw and evaluated the patient. I have discussed the patient with the resident/non-physician practitioner and agree with the resident's/non-physician practitioner's findings, Plan of Care, and MDM as documented in the resident's/non-physician practitioner's note, except where noted. All available labs and Radiology studies were reviewed.  I was present for key portions of any procedure(s) performed by the resident/non-physician practitioner and I was immediately available to provide assistance.       At this point I agree with the current assessment done in the Emergency Department.  I have conducted an independent evaluation of this patient a history and physical is as follows:    Final Diagnosis:  1. Post-operative pain    2. History of thrombectomy    3. Shortness of breath      Chief Complaint   Patient presents with    Post-op Problem     Patient was recently discharged from hospital on Friday with a blood clot in R leg and had surgery to remove the clot. States the past two days had two syncopal episodes and has been experiencing SOB. Also reports pain is still in R leg but also in L leg now. Describes also having blurry vision and becoming more forgetful.            A:  - 60-year-old male who presents with intermittent episodes of confusion, blurry vision, dizziness/lightheadedness.      P:  - Given patient's concerns, will do a cardiac workup.   - Will do an EKG for arrythmia, strain; troponin for same as per protocol for evaluation of ACS.   - CBC for anemia; CMP for kidney function and electrolytes.   - CTA head and neck to evaluate for CVA, mass, bleed.  - Admit for likely TIA workup.        Past Medical History:   Diagnosis Date    Hypertension           H:    60-year-old male who presents with multiple complaints.  Patient recently admitted to the hospital with right lower extremity ischemia status post thrombectomy.  States that since discharge, has been experiencing  "intermittent episodes of \"blacking out\".  States that he intermittently has episodes of confusion, blurry vision, dizziness/lightheadedness.  Also complaining of intermittent shortness of breath.  Patient is taking his medication as prescribed.      PMH:  Past Medical History:   Diagnosis Date    Hypertension        PSH:  Past Surgical History:   Procedure Laterality Date    INCISION AND DRAINAGE INTRA ORAL ABSCESS Left 5/5/2025    Procedure: INCISION AND DRAINAGE  (I&D) WOUND ORAL;  Surgeon: Dandre St DMD;  Location: [facility];  Service: Maxillofacial    IR LOWER EXTREMITY ANGIOGRAM  5/3/2025    MULTIPLE TOOTH EXTRACTIONS Bilateral 5/5/2025    Procedure: EXTRACTION TEETH #4, 14, 15;  Surgeon: Dandre St DMD;  Location: [facility];  Service: Maxillofacial    ME RPR 1ST INGUN HRNA AGE 5 YRS/> REDUCIBLE Right 5/18/2021    Procedure: REPAIR HERNIA INGUINAL;  Surgeon: Darin Mendoza DO;  Location: [facility];  Service: General         PE:   Vitals:    05/15/25 0130 05/15/25 0200 05/15/25 0230 05/15/25 0409   BP: (!) 176/85 156/74 167/81 168/74   BP Location: Left arm Left arm Left arm Left arm   Pulse: 62 55 57 60   Resp: 18 18 18 18   Temp: 97.6 °F (36.4 °C)      TempSrc: Temporal      SpO2: 100% 96% 96% 95%         Constitutional: Vital signs are normal. He appears well-developed. He is cooperative. No distress.   Cardiovascular: Normal rate, regular rhythm, normal heart sounds.   No murmur heard.  Pulmonary/Chest: Effort normal and breath sounds normal.   Abdominal: Soft. Normal appearance and bowel sounds are normal. There is no tenderness. There is no rebound, no guarding.   Neurological: He is alert.  GCS 15.  Normal gait.  MSK: 1+ DP and PT pulses bilaterally.  Bilateral feet are warm, perfusing well.  Psychiatric: He has a normal mood and affect. His speech is normal and behavior is normal. Thought content normal.          - 13 point ROS was performed and all are normal unless stated in the history " above.   - Nursing note reviewed. Vitals reviewed.   - Orders placed by myself and/or advanced practitioner / resident.    - Previous chart was reviewed  - No language barrier.   - History obtained from patient.   - There are no limitations to the history obtained. Reasons ROS could not be obtained:  N/A         Medications   iohexol (OMNIPAQUE) 350 MG/ML injection (SINGLE-DOSE) 85 mL (85 mL Intravenous Given 5/15/25 0324)     CTA head and neck with and without contrast   Final Result      CT Brain: No acute intracranial hemorrhage. Mild microangiopathy. No significant interval change. Once again, the degree of ventricular dilatation is somewhat out of proportion to the degree of sulcal prominence, similar to May 3, 2025. This may be seen    in normal pressure hydrocephalus. Clinical correlation is recommended.  If further evaluation is indicated, MRI with diffusion weighted imaging could be performed, if there are no contraindications.      CT Angiography: Atherosclerotic changes, as described above. Please see discussion. No evidence of large vessel occlusion.                  Workstation performed: [id number]         XR chest 2 views   ED Interpretation   No acute cardiopulmonary disease        Orders Placed This Encounter   Procedures    CTA head and neck with and without contrast    XR chest 2 views    CBC and differential    Protime-INR    APTT    Comprehensive metabolic panel    HS Troponin 0hr (reflex protocol)    HS Troponin I 2hr    HS Troponin I 4hr    24 Hour Telemetry Monitoring    ECG 12 lead    Place in Observation     Labs Reviewed   PROTIME-INR - Normal       Result Value Ref Range Status    Protime 13.1  12.3 - 15.0 seconds Final    INR 0.96  0.85 - 1.19 Final    Narrative:     INR Therapeutic Range    Indication                                             INR Range      Atrial Fibrillation                                               2.0-3.0  Hypercoagulable State                                   "  2.0.2.3  Left Ventricular Asist Device                            2.0-3.0  Mechanical Heart Valve                                  -    Aortic(with afib, MI, embolism, HF, LA enlargement,    and/or coagulopathy)                                     2.0-3.0 (2.5-3.5)     Mitral                                                             2.5-3.5  Prosthetic/Bioprosthetic Heart Valve               2.0-3.0  Venous thromboembolism (VTE: VT, PE        2.0-3.0   APTT - Normal    PTT 31  23 - 34 seconds Final    Comment: Therapeutic Heparin Range =  60-90 seconds   HS TROPONIN I 0HR - Normal    hs TnI 0hr 5  \"Refer to ACS Flowchart\"- see link ng/L Final    Comment:                                              Initial (time 0) result  If >=50 ng/L, Myocardial injury suggested ;  Type of myocardial injury and treatment strategy  to be determined.  If 5-49 ng/L, a delta result at 2 hours will be needed to further evaluate.  If <4 ng/L, and chest pain has been >3 hours since onset, patient may qualify for discharge based on the HEART score in the ED.  If <5 ng/L and <3hours since onset of chest pain, a delta result at 2 hours will be needed to further evaluate.    HS Troponin 99th Percentile URL of a Health Population=12 ng/L with a 95% Confidence Interval of 8-18 ng/L.    Second Troponin (time 2 hours)  If calculated delta >= 20 ng/L,  Myocardial injury suggested ; Type of myocardial injury and treatment strategy to be determined.  If 5-49 ng/L and the calculated delta is 5-19 ng/L, consult medical service for evaluation.  Continue evaluation for ischemia on ecg and other possible etiology and repeat hs troponin at 4 hours.  If delta is <5 ng/L at 2 hours, consider discharge based on risk stratification via the HEART score (if in ED), or STEPHANIE risk score in IP/Observation.    HS Troponin 99th Percentile URL of a Health Population=12 ng/L with a 95% Confidence Interval of 8-18 ng/L.   CBC AND DIFFERENTIAL    WBC 9.39  4.31 - " 10.16 Thousand/uL Final    RBC 4.19  3.88 - 5.62 Million/uL Final    Hemoglobin 13.5  12.0 - 17.0 g/dL Final    Hematocrit 39.5  36.5 - 49.3 % Final    MCV 94  82 - 98 fL Final    MCH 32.2  26.8 - 34.3 pg Final    MCHC 34.2  31.4 - 37.4 g/dL Final    RDW 12.6  11.6 - 15.1 % Final    MPV 10.2  8.9 - 12.7 fL Final    Platelets 374  149 - 390 Thousands/uL Final    nRBC 0  /100 WBCs Final    Segmented % 49  43 - 75 % Final    Immature Grans % 0  0 - 2 % Final    Lymphocytes % 35  14 - 44 % Final    Monocytes % 11  4 - 12 % Final    Eosinophils Relative 4  0 - 6 % Final    Basophils Relative 1  0 - 1 % Final    Absolute Neutrophils 4.65  1.85 - 7.62 Thousands/µL Final    Absolute Immature Grans 0.03  0.00 - 0.20 Thousand/uL Final    Absolute Lymphocytes 3.26  0.60 - 4.47 Thousands/µL Final    Absolute Monocytes 1.00  0.17 - 1.22 Thousand/µL Final    Eosinophils Absolute 0.39  0.00 - 0.61 Thousand/µL Final    Basophils Absolute 0.06  0.00 - 0.10 Thousands/µL Final   COMPREHENSIVE METABOLIC PANEL    Sodium 140  135 - 147 mmol/L Final    Potassium 3.5  3.5 - 5.3 mmol/L Final    Chloride 106  96 - 108 mmol/L Final    CO2 26  21 - 32 mmol/L Final    ANION GAP 8  4 - 13 mmol/L Final    BUN 21  5 - 25 mg/dL Final    Creatinine 0.84  0.60 - 1.30 mg/dL Final    Comment: Standardized to IDMS reference method    Glucose 120  65 - 140 mg/dL Final    Comment: If the patient is fasting, the ADA then defines impaired fasting glucose as > 100 mg/dL and diabetes as > or equal to 123 mg/dL.    Calcium 9.6  8.4 - 10.2 mg/dL Final    AST 13  13 - 39 U/L Final    ALT 22  7 - 52 U/L Final    Comment: Specimen collection should occur prior to Sulfasalazine administration due to the potential for falsely depressed results.     Alkaline Phosphatase 69  34 - 104 U/L Final    Total Protein 7.2  6.4 - 8.4 g/dL Final    Albumin 4.2  3.5 - 5.0 g/dL Final    Total Bilirubin 0.32  0.20 - 1.00 mg/dL Final    Comment: Use of this assay is not recommended  for patients undergoing treatment with eltrombopag due to the potential for falsely elevated results.  N-acetyl-p-benzoquinone imine (metabolite of Acetaminophen) will generate erroneously low results in samples for patients that have taken an overdose of Acetaminophen.    eGFR 95  ml/min/1.73sq m Final    Narrative:     [employer] guidelines for Chronic Kidney Disease (CKD):     Stage 1 with normal or high GFR (GFR > 90 mL/min/1.73 square meters)    Stage 2 Mild CKD (GFR = 60-89 mL/min/1.73 square meters)    Stage 3A Moderate CKD (GFR = 45-59 mL/min/1.73 square meters)    Stage 3B Moderate CKD (GFR = 30-44 mL/min/1.73 square meters)    Stage 4 Severe CKD (GFR = 15-29 mL/min/1.73 square meters)    Stage 5 End Stage CKD (GFR <15 mL/min/1.73 square meters)  Note: GFR calculation is accurate only with a steady state creatinine   HS TROPONIN I 2HR   HS TROPONIN I 4HR     Time reflects when diagnosis was documented in both MDM as applicable and the Disposition within this note       Time User Action Codes Description Comment    5/15/2025  3:20 AM Thanh Aguilera [G89.18] Post-operative pain     5/15/2025  3:21 AM Thanh Aguilera [Z98.890,  Z86.718] History of thrombectomy     5/15/2025  3:21 AM Thanh Aguilera [R06.02] Shortness of breath           ED Disposition       ED Disposition   Admit    Condition   Stable    Date/Time   Thu May 15, 2025  2:30 AM    Comment   --             Follow-up Information    None       Patient's Medications   Discharge Prescriptions    No medications on file     No discharge procedures on file.  Prior to Admission Medications   Prescriptions Last Dose Informant Patient Reported? Taking?   amLODIPine (NORVASC) 10 mg tablet 5/14/2025  Yes Yes   Sig: Take 10 mg by mouth in the morning.   amoxicillin-clavulanate (AUGMENTIN) 875-125 mg per tablet 5/14/2025  No Yes   Sig: Take 1 tablet by mouth every 12 (twelve) hours for 10 days   apixaban (Eliquis) 5 mg 5/14/2025  No  "Yes   Sig: Take 1 tablet (5 mg total) by mouth 2 (two) times a day   aspirin (ECOTRIN LOW STRENGTH) 81 mg EC tablet 5/14/2025 Morning  Yes Yes   Sig: Take 81 mg by mouth in the morning.   atorvastatin (LIPITOR) 40 mg tablet 5/14/2025  No Yes   Sig: Take 1 tablet (40 mg total) by mouth daily with dinner   lisinopril (ZESTRIL) 30 mg tablet 5/14/2025  Yes Yes   Sig: Take 30 mg by mouth in the morning.      Facility-Administered Medications: None       Portions of the record may have been created with voice recognition software. Occasional wrong word or \"sound a like\" substitutions may have occurred due to the inherent limitations of voice recognition software. Read the chart carefully and recognize, using context, where substitutions have occurred.       ED Course         Critical Care Time  Procedures      "

## 2025-05-15 NOTE — ED PROVIDER NOTES
Time reflects when diagnosis was documented in both MDM as applicable and the Disposition within this note       Time User Action Codes Description Comment    5/15/2025  3:20 AM Thanh Aguilera [G89.18] Post-operative pain     5/15/2025  3:21 AM Thanh Aguilera [Z98.890,  Z86.718] History of thrombectomy     5/15/2025  3:21 AM Thanh Aguilera [R06.02] Shortness of breath           ED Disposition       ED Disposition   Admit    Condition   Stable    Date/Time   Thu May 15, 2025  2:30 AM    Comment   --             Assessment & Plan       Medical Decision Making  Amount and/or Complexity of Data Reviewed  Labs: ordered. Decision-making details documented in ED Course.  Radiology: ordered and independent interpretation performed. Decision-making details documented in ED Course.  ECG/medicine tests:  Decision-making details documented in ED Course.    Risk  Prescription drug management.  Decision regarding hospitalization.      Patient is a 60 y.o. male with a past medical history of hypertension and recent right lower extremity thrombectomy secondary to limb ischemia on Eliquis presenting for worsening right lower extremity pain, shortness of breath, and 2 syncopal episodes yesterday.    Vital signs hypertensive but otherwise stable. On exam no abnormalities.    Differential diagnosis included but not limited to anemia, electrolyte abnormality, acute kidney injury, acute coronary syndrome, arrhythmia, pneumonia, acute intracranial abnormality, carotid dissection.     Plan: CBC, CMP, troponin, coagulation studies, EKG, CXR, CTA head/neck    View ED course for further discussion on patient workup.     On review of previous records patient was admitted to the hospital on May 2, 2025 and received a right lower extremity thrombectomy secondary to critical limb ischemia; patient takes Eliquis.    All labs reviewed and utilized in the medical decision making process  All radiology studies independently viewed by me and  "interpreted by the radiologist.  I reviewed all testing with the patient.    Disposition: Admitted to [facility] for syncope and TIA workups.    Portions of the record may have been created with voice recognition software. Occasional wrong word or \"sound a like\" substitutions may have occurred due to the inherent limitations of voice recognition software. Read the chart carefully and recognize, using context, where substitutions have occurred.    ED Course as of 05/15/25 0443   Thu May 15, 2025   0218 ECG 12 lead  Slow rate, regular rhythm, left axis deviation.  P waves present.  Nonspecific T wave abnormalities noted in leads I, II, V1, V4, V5, V6.  EKG interpreted as sinus bradycardia with left axis deviation.  Nonspecific T wave abnormalities.  EKG similar to prior from May 2, 2025.   0230 PTT: 31  Decreased compared to prior values   0230 PROTIME: 13.1  Consistent with prior values   0230 POCT INR: 0.96  Consistent with prior values   0239 hs TnI 0hr: 5  Low suspicion for acute coronary syndrome   0240 Comprehensive metabolic panel  Unremarkable   0329 CBC and differential  Unremarkable   0422 CTA head and neck with and without contrast  IMPRESSION:     CT Brain: No acute intracranial hemorrhage. Mild microangiopathy. No significant interval change. Once again, the degree of ventricular dilatation is somewhat out of proportion to the degree of sulcal prominence, similar to May 3, 2025. This may be seen   in normal pressure hydrocephalus. Clinical correlation is recommended.  If further evaluation is indicated, MRI with diffusion weighted imaging could be performed, if there are no contraindications.     CT Angiography: Atherosclerotic changes, as described above. Please see discussion. No evidence of large vessel occlusion         Medications   iohexol (OMNIPAQUE) 350 MG/ML injection (SINGLE-DOSE) 85 mL (85 mL Intravenous Given 5/15/25 0324)       ED Risk Strat Scores                    No data recorded        SBIRT " 22yo+      Flowsheet Row Most Recent Value   Initial Alcohol Screen: US AUDIT-C     1. How often do you have a drink containing alcohol? 0 Filed at: 05/15/2025 0131   2. How many drinks containing alcohol do you have on a typical day you are drinking?  0 Filed at: 05/15/2025 0131   3a. Male UNDER 65: How often do you have five or more drinks on one occasion? 0 Filed at: 05/15/2025 0131   3b. FEMALE Any Age, or MALE 65+: How often do you have 4 or more drinks on one occassion? 0 Filed at: 05/15/2025 0131   Audit-C Score 0 Filed at: 05/15/2025 0131   SANDRA: How many times in the past year have you...    Used an illegal drug or used a prescription medication for non-medical reasons? Never Filed at: 05/15/2025 0131                            History of Present Illness       Chief Complaint   Patient presents with    Post-op Problem     Patient was recently discharged from hospital on Friday with a blood clot in R leg and had surgery to remove the clot. States the past two days had two syncopal episodes and has been experiencing SOB. Also reports pain is still in R leg but also in L leg now. Describes also having blurry vision and becoming more forgetful.        Past Medical History:   Diagnosis Date    Hypertension       Past Surgical History:   Procedure Laterality Date    INCISION AND DRAINAGE INTRA ORAL ABSCESS Left 5/5/2025    Procedure: INCISION AND DRAINAGE  (I&D) WOUND ORAL;  Surgeon: Dandre St DMD;  Location: [facility];  Service: Maxillofacial    IR LOWER EXTREMITY ANGIOGRAM  5/3/2025    MULTIPLE TOOTH EXTRACTIONS Bilateral 5/5/2025    Procedure: EXTRACTION TEETH #4, 14, 15;  Surgeon: Dandre St DMD;  Location: [facility];  Service: Maxillofacial    KY RPR 1ST INGUN HRNA AGE 5 YRS/> REDUCIBLE Right 5/18/2021    Procedure: REPAIR HERNIA INGUINAL;  Surgeon: Darin Mendoza DO;  Location: [facility];  Service: General      Family History   Problem Relation Age of Onset    No Known Problems Mother     No Known  "Problems Father       Social History[1]   E-Cigarette/Vaping      E-Cigarette/Vaping Substances      I have reviewed and agree with the history as documented.     HPI    Patient is a 60-year-old male with a past medical history of hypertension and recent right lower extremity thrombectomy secondary to limb ischemia on Eliquis presenting for worsening right lower extremity pain, shortness of breath, and 2 syncopal episodes yesterday.  Patient states he \"blacked out\"/syncopized twice yesterday and had some right foot swelling 3 days ago that is since resolved.  Patient denies calf pain/swelling, chest pain, and hemoptysis.    Review of Systems   Respiratory:  Positive for shortness of breath.    Musculoskeletal:  Positive for myalgias.   Neurological:  Positive for syncope.   All other systems reviewed and are negative.          Objective       ED Triage Vitals [05/15/25 0130]   Temperature Pulse Blood Pressure Respirations SpO2 Patient Position - Orthostatic VS   97.6 °F (36.4 °C) 62 (!) 176/85 18 100 % Sitting      Temp Source Heart Rate Source BP Location FiO2 (%) Pain Score    Temporal Monitor Left arm -- 8      Vitals      Date and Time Temp Pulse SpO2 Resp BP Pain Score FACES Pain Rating User   05/15/25 0409 -- 60 95 % 18 168/74 -- -- ED   05/15/25 0230 -- 57 96 % 18 167/81 -- -- ED   05/15/25 0200 -- 55 96 % 18 156/74 -- -- ED   05/15/25 0130 97.6 °F (36.4 °C) 62 100 % 18 176/85 8 -- OO            Physical Exam  Vitals and nursing note reviewed.   Constitutional:       General: He is not in acute distress.     Appearance: Normal appearance. He is well-developed. He is not ill-appearing or toxic-appearing.   HENT:      Head: Normocephalic and atraumatic.     Eyes:      General: No scleral icterus.        Right eye: No discharge.         Left eye: No discharge.      Extraocular Movements: Extraocular movements intact.      Conjunctiva/sclera: Conjunctivae normal.      Pupils: Pupils are equal, round, and reactive " to light.       Cardiovascular:      Rate and Rhythm: Regular rhythm. Bradycardia present.      Pulses: Normal pulses.           Dorsalis pedis pulses are 2+ on the right side and 2+ on the left side.      Heart sounds: Normal heart sounds. No murmur heard.  Pulmonary:      Effort: Pulmonary effort is normal. No respiratory distress.      Breath sounds: Normal breath sounds. No stridor. No wheezing, rhonchi or rales.   Abdominal:      General: Bowel sounds are normal. There is no distension.      Palpations: Abdomen is soft.      Tenderness: There is no abdominal tenderness. There is no right CVA tenderness, left CVA tenderness, guarding or rebound. Negative signs include López's sign and McBurney's sign.     Musculoskeletal:         General: No swelling.      Cervical back: Normal range of motion and neck supple.      Right lower leg: No edema.      Left lower leg: No edema.     Skin:     General: Skin is warm and dry.      Capillary Refill: Capillary refill takes less than 2 seconds.      Coloration: Skin is not jaundiced.      Findings: No erythema.     Neurological:      General: No focal deficit present.      Mental Status: He is alert and oriented to person, place, and time.      GCS: GCS eye subscore is 4. GCS verbal subscore is 5. GCS motor subscore is 6.      Cranial Nerves: Cranial nerves 2-12 are intact. No cranial nerve deficit.      Sensory: Sensation is intact. No sensory deficit.      Motor: Motor function is intact. No weakness.     Psychiatric:         Mood and Affect: Mood normal.         Behavior: Behavior normal.         Thought Content: Thought content normal.         Judgment: Judgment normal.         Results Reviewed       Procedure Component Value Units Date/Time    HS Troponin I 2hr [[id number]] Collected: 05/15/25 0409    Lab Status: In process Specimen: Blood from Arm, Right Updated: 05/15/25 0414    HS Troponin I 4hr [[id number]]     Lab Status: No result Specimen: Blood     CBC and  differential [[id number]] Collected: 05/15/25 0205    Lab Status: Final result Specimen: Blood from Arm, Right Updated: 05/15/25 0327     WBC 9.39 Thousand/uL      RBC 4.19 Million/uL      Hemoglobin 13.5 g/dL      Hematocrit 39.5 %      MCV 94 fL      MCH 32.2 pg      MCHC 34.2 g/dL      RDW 12.6 %      MPV 10.2 fL      Platelets 374 Thousands/uL      nRBC 0 /100 WBCs      Segmented % 49 %      Immature Grans % 0 %      Lymphocytes % 35 %      Monocytes % 11 %      Eosinophils Relative 4 %      Basophils Relative 1 %      Absolute Neutrophils 4.65 Thousands/µL      Absolute Immature Grans 0.03 Thousand/uL      Absolute Lymphocytes 3.26 Thousands/µL      Absolute Monocytes 1.00 Thousand/µL      Eosinophils Absolute 0.39 Thousand/µL      Basophils Absolute 0.06 Thousands/µL     Comprehensive metabolic panel [[id number]] Collected: 05/15/25 0205    Lab Status: Final result Specimen: Blood from Arm, Right Updated: 05/15/25 0240     Sodium 140 mmol/L      Potassium 3.5 mmol/L      Chloride 106 mmol/L      CO2 26 mmol/L      ANION GAP 8 mmol/L      BUN 21 mg/dL      Creatinine 0.84 mg/dL      Glucose 120 mg/dL      Calcium 9.6 mg/dL      AST 13 U/L      ALT 22 U/L      Alkaline Phosphatase 69 U/L      Total Protein 7.2 g/dL      Albumin 4.2 g/dL      Total Bilirubin 0.32 mg/dL      eGFR 95 ml/min/1.73sq m     Narrative:      National Kid[employer] Foundation guidelines for Chronic Kidney Disease (CKD):     Stage 1 with normal or high GFR (GFR > 90 mL/min/1.73 square meters)    Stage 2 Mild CKD (GFR = 60-89 mL/min/1.73 square meters)    Stage 3A Moderate CKD (GFR = 45-59 mL/min/1.73 square meters)    Stage 3B Moderate CKD (GFR = 30-44 mL/min/1.73 square meters)    Stage 4 Severe CKD (GFR = 15-29 mL/min/1.73 square meters)    Stage 5 End Stage CKD (GFR <15 mL/min/1.73 square meters)  Note: GFR calculation is accurate only with a steady state creatinine    HS Troponin 0hr (reflex protocol) [[id number]]  (Normal) Collected:  05/15/25 0205    Lab Status: Final result Specimen: Blood from Arm, Right Updated: 05/15/25 0238     hs TnI 0hr 5 ng/L     Protime-INR [[id number]]  (Normal) Collected: 05/15/25 0205    Lab Status: Final result Specimen: Blood from Arm, Right Updated: 05/15/25 0228     Protime 13.1 seconds      INR 0.96    Narrative:      INR Therapeutic Range    Indication                                             INR Range      Atrial Fibrillation                                               2.0-3.0  Hypercoagulable State                                    2.0.2.3  Left Ventricular Asist Device                            2.0-3.0  Mechanical Heart Valve                                  -    Aortic(with afib, MI, embolism, HF, LA enlargement,    and/or coagulopathy)                                     2.0-3.0 (2.5-3.5)     Mitral                                                             2.5-3.5  Prosthetic/Bioprosthetic Heart Valve               2.0-3.0  Venous thromboembolism (VTE: VT, PE        2.0-3.0    APTT [[id number]]  (Normal) Collected: 05/15/25 0205    Lab Status: Final result Specimen: Blood from Arm, Right Updated: 05/15/25 0228     PTT 31 seconds             CTA head and neck with and without contrast   Final Interpretation by Amy Whyte MD (05/15 0420)      CT Brain: No acute intracranial hemorrhage. Mild microangiopathy. No significant interval change. Once again, the degree of ventricular dilatation is somewhat out of proportion to the degree of sulcal prominence, similar to May 3, 2025. This may be seen    in normal pressure hydrocephalus. Clinical correlation is recommended.  If further evaluation is indicated, MRI with diffusion weighted imaging could be performed, if there are no contraindications.      CT Angiography: Atherosclerotic changes, as described above. Please see discussion. No evidence of large vessel occlusion.                  Workstation performed: [id number]         XR chest 2 views    ED Interpretation by Thanh Aguilera DO (05/15 0311)   No acute cardiopulmonary disease          Procedures    ED Medication and Procedure Management   Prior to Admission Medications   Prescriptions Last Dose Informant Patient Reported? Taking?   amLODIPine (NORVASC) 10 mg tablet 5/14/2025  Yes Yes   Sig: Take 10 mg by mouth in the morning.   amoxicillin-clavulanate (AUGMENTIN) 875-125 mg per tablet 5/14/2025  No Yes   Sig: Take 1 tablet by mouth every 12 (twelve) hours for 10 days   apixaban (Eliquis) 5 mg 5/14/2025  No Yes   Sig: Take 1 tablet (5 mg total) by mouth 2 (two) times a day   aspirin (ECOTRIN LOW STRENGTH) 81 mg EC tablet 5/14/2025 Morning  Yes Yes   Sig: Take 81 mg by mouth in the morning.   atorvastatin (LIPITOR) 40 mg tablet 5/14/2025  No Yes   Sig: Take 1 tablet (40 mg total) by mouth daily with dinner   lisinopril (ZESTRIL) 30 mg tablet 5/14/2025  Yes Yes   Sig: Take 30 mg by mouth in the morning.      Facility-Administered Medications: None     Patient's Medications   Discharge Prescriptions    No medications on file     No discharge procedures on file.  ED SEPSIS DOCUMENTATION   Time reflects when diagnosis was documented in both MDM as applicable and the Disposition within this note       Time User Action Codes Description Comment    5/15/2025  3:20 AM Thanh Aguilera [G89.18] Post-operative pain     5/15/2025  3:21 AM Thanh Aguilera [Z98.890,  Z86.718] History of thrombectomy     5/15/2025  3:21 AM Thanh Aguilera [R06.02] Shortness of breath                      [1]   Social History  Tobacco Use    Smoking status: Some Days    Smokeless tobacco: Never   Substance Use Topics    Alcohol use: Yes    Drug use: Never        Thanh Aguilera DO  05/15/25 0647

## 2025-05-15 NOTE — ASSESSMENT & PLAN NOTE
"Patient recently admitted here 5/2/2025 - 5/8/2025 with critical limb ischemia of the right lower extremity s/p thrombectomy  Since discharge had noted intermittent episodes of \"blacking out\" as though he was about to syncopize, additionally noted some episodes of confusion/disorientation with blurred vision, slurred speech, dizziness/lightheadedness followed by prolonged sleep.  Denies persisting focal deficits otherwise  Labs during ED evaluation without abnormality compared to prior, CTA head/neck without acute intracranial pathology or large vessel occlusion; persistent ventriculomegaly. Concerning for NPH was noted. Since no symptoms at the time patient was advised to follow with neuro as outpatient.   Orthostatics were negative  Recently had TTE 5/7/2025 revealing EF 60%, no visualized thrombus, no PFO  Monitor on telemetry, so far the telemetry monitoring was unremarkable will continue.  Possible arrhythmias  CT head shows possible NPH. Discussed with neurosurgery and they said they do not see NPH inpaient. Will put in outpatient consult for Neurosurgery  Will get MRI and routine EEG which still pending  Continue to monitor mental status closely, neurochecks  "

## 2025-05-15 NOTE — ASSESSMENT & PLAN NOTE
S/p dental extraction by [facility] during prior admission 5/5/2025  Discharged on Augmentin, continue through treatment course.  Outpatient follow-up with [facility]/dentist as appropriate

## 2025-05-15 NOTE — ASSESSMENT & PLAN NOTE
"Patient recently admitted here 5/2/2025 - 5/8/2025 with critical limb ischemia of the right lower extremity s/p thrombectomy  Since discharge had noted intermittent episodes of \"blacking out\" as though he was about to syncopize, additionally noted some episodes of confusion/disorientation with blurred vision, slurred speech, dizziness/lightheadedness followed by prolonged sleep.  Denies persisting focal deficits otherwise  Labs during ED evaluation without abnormality compared to prior, CTA head/neck without acute intracranial pathology or large vessel occlusion; persistent ventriculomegaly out of proportion and atherosclerotic changes without occlusion noted  Broad potential etiology for symptoms: Consideration for syncopal event versus partial seizure versus other etiology  Monitor on telemetry, check orthostatics.  Recently had TTE 5/7/2025 revealing EF 60%, no visualized thrombus, no PFO  Given prior CT findings and description of events will check MRI brain, routine EEG  Continue to monitor mental status closely, neurochecks  "

## 2025-05-16 ENCOUNTER — APPOINTMENT (OUTPATIENT)
Dept: RADIOLOGY | Facility: HOSPITAL | Age: 60
End: 2025-05-16
Payer: MEDICARE

## 2025-05-16 VITALS
DIASTOLIC BLOOD PRESSURE: 78 MMHG | HEART RATE: 83 BPM | WEIGHT: 179.9 LBS | RESPIRATION RATE: 18 BRPM | OXYGEN SATURATION: 98 % | TEMPERATURE: 97.7 F | HEIGHT: 68 IN | SYSTOLIC BLOOD PRESSURE: 147 MMHG | BODY MASS INDEX: 27.26 KG/M2

## 2025-05-16 PROCEDURE — 95819 EEG AWAKE AND ASLEEP: CPT | Performed by: STUDENT IN AN ORGANIZED HEALTH CARE EDUCATION/TRAINING PROGRAM

## 2025-05-16 PROCEDURE — 70551 MRI BRAIN STEM W/O DYE: CPT

## 2025-05-16 PROCEDURE — 97166 OT EVAL MOD COMPLEX 45 MIN: CPT

## 2025-05-16 PROCEDURE — 97163 PT EVAL HIGH COMPLEX 45 MIN: CPT

## 2025-05-16 PROCEDURE — 99239 HOSP IP/OBS DSCHRG MGMT >30: CPT | Performed by: FAMILY MEDICINE

## 2025-05-16 PROCEDURE — 99244 OFF/OP CNSLTJ NEW/EST MOD 40: CPT | Performed by: STUDENT IN AN ORGANIZED HEALTH CARE EDUCATION/TRAINING PROGRAM

## 2025-05-16 RX ADMIN — APIXABAN 5 MG: 5 TABLET, FILM COATED ORAL at 08:13

## 2025-05-16 RX ADMIN — ASPIRIN 81 MG: 81 TABLET, COATED ORAL at 08:13

## 2025-05-16 RX ADMIN — AMOXICILLIN AND CLAVULANATE POTASSIUM 1 TABLET: 875; 125 TABLET, COATED ORAL at 08:14

## 2025-05-16 RX ADMIN — AMLODIPINE BESYLATE 10 MG: 10 TABLET ORAL at 08:13

## 2025-05-16 RX ADMIN — LISINOPRIL 30 MG: 20 TABLET ORAL at 08:14

## 2025-05-16 RX ADMIN — NICOTINE 1 PATCH: 7 PATCH, EXTENDED RELEASE TRANSDERMAL at 08:10

## 2025-05-16 NOTE — PHYSICAL THERAPY NOTE
Physical Therapy Evaluation     Patient's Name: Roland Campbell    Admitting Diagnosis  Shortness of breath [R06.02]  Post-operative pain [G89.18]  Post-operative complication [T81.9XXA]  History of thrombectomy [Z98.890, Z86.718]    Problem List  Problem List[1]    Past Medical History  Past Medical History:   Diagnosis Date    Hypertension        Past Surgical History  Past Surgical History:   Procedure Laterality Date    INCISION AND DRAINAGE INTRA ORAL ABSCESS Left 5/5/2025    Procedure: INCISION AND DRAINAGE  (I&D) WOUND ORAL;  Surgeon: Dandre St DMD;  [facility];  Service: Maxillofacial    IR LOWER EXTREMITY ANGIOGRAM  5/3/2025    MULTIPLE TOOTH EXTRACTIONS Bilateral 5/5/2025    Procedure: EXTRACTION TEETH #4, 14, 15;  Surgeon: Dandre St DMD;  [facility];  Service: Maxillofacial    MO RPR 1ST INGUN HRNA AGE 5 YRS/> REDUCIBLE Right 5/18/2021    Procedure: REPAIR HERNIA INGUINAL;  Surgeon: Darin Mendoza DO;  [facility];  Service: General          05/16/25 0827   PT Last Visit   PT Visit Date 05/16/25   Note Type   Note type Evaluation   Pain Assessment   Pain Assessment Tool 0-10   Pain Score No Pain   Restrictions/Precautions   Weight Bearing Precautions Per Order No   Other Precautions Chair Alarm;Bed Alarm;Multiple lines;Telemetry;Fall Risk   Home Living   Type of Home Apartment   Home Layout One level  (0STE)   Bathroom Shower/Tub Tub/shower unit   Bathroom Toilet Standard   Bathroom Equipment   (denies)   Bathroom Accessibility Accessible   Home Equipment Cane  (does not use PTA)   Prior Function   Level of [place] Independent with ADLs;Independent with functional mobility;Independent with IADLS   Lives With Other (Comment)  (roommate)   IADLs Independent with driving;Independent with meal prep;Independent with medication management   Falls in the last 6 months (S)  1 to 4  (4)   Vocational Full time employment   General   Family/Caregiver Present No    Cognition   Overall Cognitive Status WFL   Arousal/Participation Alert   Orientation Level Oriented X4   Memory Within functional limits   Following Commands Follows all commands and directions without difficulty   Subjective   Subjective pt pleasant and cooperative throughout therapy session. pt received supine in bed   RLE Assessment   RLE Assessment WFL   LLE Assessment   LLE Assessment WFL   Bed Mobility   Supine to Sit 5  Supervision   Additional items Increased time required;Verbal cues;Impulsive   Sit to Supine Unable to assess   Transfers   Sit to Stand 5  Supervision   Additional items Increased time required;Verbal cues;Impulsive   Stand to Sit 5  Supervision   Additional items Increased time required;Impulsive;Verbal cues   Additional Comments transfers w/o AD   Ambulation/Elevation   Gait pattern Improper Weight shift;Wide [place];Shuffling;Knees flexed   Gait Assistance 5  Supervision   Additional items Verbal cues   Assistive Device None   Distance 100'   Stair Management Assistance Not tested   Balance   Static Sitting Fair +   Dynamic Sitting Fair   Static Standing Fair   Dynamic Standing Fair -   Ambulatory Fair -   Endurance Deficit   Endurance Deficit Yes   Endurance Deficit Description decreased exercise tolerance   Activity Tolerance   Activity Tolerance Patient limited by fatigue;Treatment limited secondary to medical complications (Comment)  (dizziness, lightheadedness)   Medical Staff Made Aware OT guzman Osullivan due to medical complexity and multiple comorbidities   Nurse Made Aware RN cleared and updated   Assessment   Prognosis Good   Problem List Impaired balance;Decreased mobility   Assessment PT orders received and acknowledged. Patient was seen today for high complexity PT evaluation. High complexity evaluation due to Ongoing medical management for primary dx, Decreased activity tolerance compared to baseline, Fall risk, Ongoing telemetry monitoring, Continuous pulse oximetry  "monitoring  Patient is a 60 y.o. male  who was admitted to [facility] on 5/15/[facility]25  with Transient alteration of awareness . Pt presents to [facility] with several episodes of \"blacking out\" . At baseline, pt resides with roommates in apartment and was independent prior to hospital admission. Currently, upon initial examination, pt  is requiring supervision   for bed mobility skills;  supervision   for functional transfers and  supervision   for ambulation with no AD.  Patient currently presents below baseline with limitations in gait, balance, and transfers. Patient will benefit from continued PT services while in hospital in order to address remaining limitations. The patients AM-PAC Basic Mobility Inpatient Short From Raw Score is 20 . Based on AM-PAC scoring and patient presentation, PT currently recommending No Post Acute Rehab Needs. Please also refer to the recommendation of the Physical Therapist for safe discharge planning.   Barriers to Discharge None   Goals   Patient Goals to go home   STG Expiration Date 05/30/25   Short Term Goal #1 Patient will be able to perform bed mobility tasks with  modified independent   in order to improve overall functional mobility and assist in safe d/c. STG 2 Patient will sit EOB for at least 25 minutes at  modified independent   level in order to strengthen abdominal musculature and assist in future transfers and ambulation. STG 3 Patient will be able to perform functional transfer with  modified independent   in order to improve overall functional mobility and assist in safe discharge. STG 4 Patient will be able to ambulate at least 300 feet with least restrictive device with  modified independent   assist in order to improve overall functional mobility and assist in safe discharge. STG 5 Patient will improve sitting/standing static/dynamic balance 1/2 grade in order to improve functional mobility and assist in safe discharge.   PT Treatment Day 0   Plan "   Treatment/Interventions ADL retraining;Functional transfer training;LE strengthening/ROM;Elevations;Therapeutic exercise;Endurance training;Bed mobility;Gait training;Spoke to nursing;OT   PT Frequency 2-3x/wk   Discharge Recommendation   Rehab Resource Intensity Level, PT No post-acute rehabilitation needs   AM-PAC Basic Mobility Inpatient   Turning in Flat Bed Without Bedrails 4   Lying on Back to Sitting on Edge of Flat Bed Without Bedrails 4   Moving Bed to Chair 3   Standing Up From Chair Using Arms 3   Walk in Room 3   Climb 3-5 Stairs With Railing 3   Basic Mobility Inpatient Raw Score 20   Basic Mobility Standardized Score 43.99   [facility] Highest Level Of Mobility   -HLM Goal 6: Walk 10 steps or more   -HLM Achieved 7: Walk 25 feet or more   End of Consult   Patient Position at End of Consult Bedside chair;All needs within reach;Bed/Chair alarm activated         Brian Curry, PT         [1]   Patient Active Problem List  Diagnosis    Status post right inguinal hernia repair    Ilioinguinal neuralgia of right side    Critical limb ischemia of right lower extremity (HCC)    Essential hypertension    Alcohol abuse    Hypertriglyceridemia    Cigarette nicotine dependence without complication    Acute renal failure (ARF) (HCC)    Abnormal CT of the head    Dental caries    Transient alteration of awareness

## 2025-05-16 NOTE — PLAN OF CARE
"  Problem: PHYSICAL THERAPY ADULT  Goal: Performs mobility at highest level of function for planned discharge setting.  See evaluation for individualized goals.  Description: Treatment/Interventions: ADL retraining, Functional transfer training, LE strengthening/ROM, Elevations, Therapeutic exercise, Endurance training, Bed mobility, Gait training, Spoke to nursing, OT          See flowsheet documentation for full assessment, interventions and recommendations.  Note: Prognosis: Good  Problem List: Impaired balance, Decreased mobility  Assessment: PT orders received and acknowledged. Patient was seen today for high complexity PT evaluation. High complexity evaluation due to Ongoing medical management for primary dx, Decreased activity tolerance compared to baseline, Fall risk, Ongoing telemetry monitoring, Continuous pulse oximetry monitoring  Patient is a 60 y.o. male  who was admitted to [facility] on 5/15/2025  with Transient alteration of awareness . Pt presents to [facility] with several episodes of \"blacking out\" . At baseline, pt resides with roommates in apartment and was independent prior to hospital admission. Currently, upon initial examination, pt  is requiring supervision   for bed mobility skills;  supervision   for functional transfers and  supervision   for ambulation with no AD.  Patient currently presents below baseline with limitations in gait, balance, and transfers. Patient will benefit from continued PT services while in hospital in order to address remaining limitations. The patients AM-PAC Basic Mobility Inpatient Short From Raw Score is 20 . Based on AM-PAC scoring and patient presentation, PT currently recommending No Post Acute Rehab Needs. Please also refer to the recommendation of the Physical Therapist for safe discharge planning.  Barriers to Discharge: None     Rehab Resource Intensity Level, PT: No post-acute rehabilitation needs    See flowsheet documentation for full assessment. "

## 2025-05-16 NOTE — OCCUPATIONAL THERAPY NOTE
Occupational Therapy Evaluation     Patient Name: Roland Campbell  Today's Date: 5/16/2025  Problem List  Principal Problem:    Transient alteration of awareness  Active Problems:    Critical limb ischemia of right lower extremity (HCC)    Essential hypertension    Cigarette nicotine dependence without complication    Dental caries    Past Medical History  Past Medical History:   Diagnosis Date    Hypertension      Past Surgical History  Past Surgical History:   Procedure Laterality Date    INCISION AND DRAINAGE INTRA ORAL ABSCESS Left 5/5/2025    Procedure: INCISION AND DRAINAGE  (I&D) WOUND ORAL;  Surgeon: Dandre St DMD;  [facility];  Service: Maxillofacial    IR LOWER EXTREMITY ANGIOGRAM  5/3/2025    MULTIPLE TOOTH EXTRACTIONS Bilateral 5/5/2025    Procedure: EXTRACTION TEETH #4, 14, 15;  Surgeon: Dandre St DMD;  [facility];  Service: Maxillofacial    FL RPR 1ST INGUN HRNA AGE 5 YRS/> REDUCIBLE Right 5/18/2021    Procedure: REPAIR HERNIA INGUINAL;  Surgeon: Darin Mendoza DO;  [facility];  Service: General         05/16/25 0828   OT Last Visit   OT Visit Date 05/16/25   Note Type   Note type Evaluation   Pain Assessment   Pain Assessment Tool 0-10   Pain Score No Pain   Restrictions/Precautions   Weight Bearing Precautions Per Order No   Other Precautions Chair Alarm;Bed Alarm;Multiple lines;Fall Risk   Home Living   Type of Home Apartment   Home Layout One level  (0 ALFRED)   Bathroom Shower/Tub Tub/shower unit   Bathroom Toilet Standard   Bathroom Equipment   (denies)   Home Equipment Cane  (not used at baseline)   Prior Function   Level of [place] Independent with ADLs;Independent with functional mobility;Independent with IADLS   Lives With   (roommate)   Receives Help From Friend(s)   IADLs Independent with driving;Independent with meal prep;Independent with medication management   Falls in the last 6 months 1 to 4  (4 falls)   Vocational Full time employment    Lifestyle   Autonomy Pt reports (I) with ADLs, IADLs, and functional mobility without AD at baseline. Pt +  and employed full time   Reciprocal Relationships friends   Service to Others employed full time   ADL   Where Assessed Edge of bed   Eating Assistance 6  Modified independent   Grooming Assistance 6  Modified Independent   UB Bathing Assistance 6  Modified Independent   LB Bathing Assistance 5  Supervision/Setup   UB Dressing Assistance 6  Modified independent   LB Dressing Assistance 5  Supervision/Setup   Toileting Assistance  5  Supervision/Setup   Functional Assistance 5  Supervision/Setup   Bed Mobility   Supine to Sit 5  Supervision   Additional items Increased time required;Verbal cues   Transfers   Sit to Stand 5  Supervision   Additional items Increased time required;Verbal cues   Stand to Sit 5  Supervision   Additional items Increased time required;Verbal cues   Additional Comments no AD   Functional Mobility   Functional Mobility 5  Supervision   Additional Comments Pt requires supervision for safety to ambulate household distances without AD. Pt reporting lightheadedness with mobility   Balance   Static Sitting Fair +   Dynamic Sitting Fair   Static Standing Fair   Dynamic Standing Fair -   Ambulatory Fair -   Activity Tolerance   Activity Tolerance Patient limited by fatigue  (dizziness)   Medical Staff Made Aware PT   Nurse Made Aware RN Cleared   RUE Assessment   RUE Assessment WFL   LUE Assessment   LUE Assessment WFL   Hand Function   Gross Motor Coordination Functional   Fine Motor Coordination Functional   Cognition   Overall Cognitive Status WFL   Arousal/Participation Alert;Responsive;Cooperative   Attention Within functional limits   Orientation Level Oriented X4   Memory Within functional limits   Following Commands Follows all commands and directions without difficulty   Comments Pt agreeable to therapy   Assessment   Prognosis Good   Assessment Pt is a 61 y/o male that was  admitted to [facility] 5/15/2025 with transient alteration of awareness. Pt with active OT orders and activity orders. Pt  has a past medical history of Hypertension. Pt lives with a roommate in a one level apartment with 0 ALFRED, tub shower, and standard toilet. Pt reports using no AD at baseline. Prior to admission pt (I) ADLs, IADLs, and functional mobility. Pt currently MOD IND to supervision to complete ADLs, functional transfers, and ambulate household distances without AD. Pt supine in bed at begning of session, pt seated in bedside chair at end of session with alarm set and items within reach. The patient's raw score on the AM-PAC Daily Activity Inpatient Short Form is 21. A raw score of greater than or equal to 19 suggests the patient may benefit from discharge to home. Please refer to the recommendation of the Occupational Therapist for safe discharge planning. Pt appears to be functioning at/close to baseline, further OT services not indicated at this time. Will d/c OT services, please re-consult if OT needs arise.   Goals   Patient Goals to go home   Plan   OT Frequency Eval only   Discharge Recommendation   Rehab Resource Intensity Level, OT No post-acute rehabilitation needs   AM-PAC Daily Activity Inpatient   Lower Body Dressing 3   Bathing 3   Toileting 3   Upper Body Dressing 4   Grooming 4   Eating 4   Daily Activity Raw Score 21   Daily Activity Standardized Score (Calc for Raw Score >=11) 44.27   AM-PAC Applied Cognition Inpatient   Following a Speech/Presentation 4   Understanding Ordinary Conversation 4   Taking Medications 4   Remembering Where Things Are Placed or Put Away 4   Remembering List of 4-5 Errands 4   Taking Care of Complicated Tasks 4   Applied Cognition Raw Score 24   Applied Cognition Standardized Score 62.21   End of Consult   Education Provided Yes   Patient Position at End of Consult Bedside chair;Bed/Chair alarm activated;All needs within reach   Nurse Communication  Nurse aware of consult     JERED Garcia, OTR/L

## 2025-05-16 NOTE — PLAN OF CARE
Problem: PAIN - ADULT  Goal: Verbalizes/displays adequate comfort level or baseline comfort level  Description: Interventions:  - Encourage patient to monitor pain and request assistance  - Assess pain using appropriate pain scale  - Administer analgesics as ordered based on type and severity of pain and evaluate response  - Implement non-pharmacological measures as appropriate and evaluate response  - Consider cultural and social influences on pain and pain management  - Notify physician/advanced practitioner if interventions unsuccessful or patient reports new pain  - Educate patient/family on pain management process including their role and importance of  reporting pain   - Provide non-pharmacologic/complimentary pain relief interventions  Outcome: Progressing     Problem: INFECTION - ADULT  Goal: Absence or prevention of progression during hospitalization  Description: INTERVENTIONS:  - Assess and monitor for signs and symptoms of infection  - Monitor lab/diagnostic results  - Monitor all insertion sites, i.e. indwelling lines, tubes, and drains  - Monitor endotracheal if appropriate and nasal secretions for changes in amount and color  - [place] appropriate cooling/warming therapies per order  - Administer medications as ordered  - Instruct and encourage patient and family to use good hand hygiene technique  - Identify and instruct in appropriate isolation precautions for identified infection/condition  Outcome: Progressing  Goal: Absence of fever/infection during neutropenic period  Description: INTERVENTIONS:  - Monitor WBC  - Perform strict hand hygiene  - Limit to healthy visitors only  - No plants, dried, fresh or silk flowers with greer in patient room  Outcome: Progressing

## 2025-05-16 NOTE — PLAN OF CARE
Problem: PAIN - ADULT  Goal: Verbalizes/displays adequate comfort level or baseline comfort level  Description: Interventions:  - Encourage patient to monitor pain and request assistance  - Assess pain using appropriate pain scale  - Administer analgesics as ordered based on type and severity of pain and evaluate response  - Implement non-pharmacological measures as appropriate and evaluate response  - Consider cultural and social influences on pain and pain management  - Notify physician/advanced practitioner if interventions unsuccessful or patient reports new pain  - Educate patient/family on pain management process including their role and importance of  reporting pain   - Provide non-pharmacologic/complimentary pain relief interventions  Outcome: Progressing     Problem: INFECTION - ADULT  Goal: Absence or prevention of progression during hospitalization  Description: INTERVENTIONS:  - Assess and monitor for signs and symptoms of infection  - Monitor lab/diagnostic results  - Monitor all insertion sites, i.e. indwelling lines, tubes, and drains  - Monitor endotracheal if appropriate and nasal secretions for changes in amount and color  - [place] appropriate cooling/warming therapies per order  - Administer medications as ordered  - Instruct and encourage patient and family to use good hand hygiene technique  - Identify and instruct in appropriate isolation precautions for identified infection/condition  Outcome: Progressing     Problem: SAFETY ADULT  Goal: Patient will remain free of falls  Description: INTERVENTIONS:  - Educate patient/family on patient safety including physical limitations  - Instruct patient to call for assistance with activity   - Consider consulting OT/PT to assist with strengthening/mobility based on AM PAC & JH-HLM score  - Consult OT/PT to assist with strengthening/mobility   - Keep Call bell within reach  - Keep bed low and locked with side rails adjusted as appropriate  - Keep  care items and personal belongings within reach  - Initiate and maintain comfort rounds  - Make Fall Risk Sign visible to staff  - Offer Toileting every  Hours, in advance of need  - Initiate/Maintain alarm  - Obtain necessary fall risk management equipment:   - Apply yellow socks and bracelet for high fall risk patients  - Consider moving patient to room near nurses station  Outcome: Progressing     Problem: DISCHARGE PLANNING  Goal: Discharge to home or other facility with appropriate resources  Description: INTERVENTIONS:  - Identify barriers to discharge w/patient and caregiver  - Arrange for needed discharge resources and transportation as appropriate  - Identify discharge learning needs (meds, wound care, etc.)  - Arrange for interpretive services to assist at discharge as needed  - Refer to Case Management Department for coordinating discharge planning if the patient needs post-hospital services based on physician/advanced practitioner order or complex needs related to functional status, cognitive ability, or social support system  Outcome: Progressing

## 2025-05-16 NOTE — DISCHARGE SUMMARY
"Discharge Summary - Hospitalist   Name: Roland Campbell 60 y.o. male I MRN: [id number]  Unit/Bed#: [facility] 421-01 I Date of Admission: 5/15/2025   Date of Service: 5/16/2025 I Hospital Day: 0     Assessment & Plan  Transient alteration of awareness  Patient recently admitted here 5/2/2025 - 5/8/2025 with critical limb ischemia of the right lower extremity s/p thrombectomy  Since discharge had noted intermittent episodes of \"blacking out\" as though he was about to syncopize, additionally noted some episodes of confusion/disorientation with blurred vision, slurred speech, dizziness/lightheadedness followed by prolonged sleep.  Denies persisting focal deficits otherwise  Labs during ED evaluation without abnormality compared to prior, CTA head/neck without acute intracranial pathology or large vessel occlusion; persistent ventriculomegaly. Concerning for NPH was noted. Since no symptoms at the time patient was advised to follow with neuro as outpatient.   Orthostatics were negative  Recently had TTE 5/7/2025 revealing EF 60%, no visualized thrombus, no PFO  Monitor on telemetry, so far the telemetry monitoring was unremarkable will continue.  Possible arrhythmias  CT head is concerning for possible NPH.  Outpatient follow-up with neurology and neurosurgery  MRI brain reviewed-no acute CVA  EEG reviewed  Neurology evaluation appreciated-note atypical seizure history.  Plan for ambulatory EEG as outpatient  Cardiology referral for Zio patch monitoring as outpatient.  Inpatient telemetry was rather unrevealing  Critical limb ischemia of right lower extremity (HCC)  Patient recently admitted here 5/2/2025 - 5/8/2025 after developing sudden onset of right lower extremity pain while ambulating, imaging concerning for occlusive thrombus and now s/p right lower extremity angiogram with mechanical thrombectomy 5/3/2025  Embolic workup at prior admission was unrevealing for definitive source  Patient did note transient right foot " "swelling 3 days prior to admission now resolved, notes some ongoing bilateral lower extremity \"cramping\" with ambulation greater than 2-3 blocks.  Patient with palpable DP/PT pulses bilaterally on admission, motor/sensory intact  Continue ASA, Eliquis, atorvastatin.  Needs outpatient follow-up with vascular surgery as appropriate.  Essential hypertension  Blood pressure elevated on admission, for now resume amlodipine 10 mg daily and lisinopril 30 mg daily with strict hold parameters.  Monitor blood pressure per protocol  Cigarette nicotine dependence without complication  Counseled again on need for cessation.  Provide nicotine patch while admitted.  Dental caries  S/p dental extraction by OMFS during prior admission 5/5/2025  Discharged on Augmentin, continue through treatment course.  Outpatient follow-up with OMFS/dentist as appropriate     Medical Problems       Resolved Problems  Date Reviewed: 5/15/2025   None       Discharging Physician / Practitioner: Hilda Foster MD  PCP: Cristian Hutton  Admission Date:   Admission Orders (From admission, onward)       Ordered        05/15/25 0443  Place in Observation  Once                          Discharge Date: 05/16/25    Consultations During Hospital Stay:  Neurology    Procedures Performed:   EEG-This was a normal awake , drowsy, and asleep Routine EEG. No electrographic seizures, interictal epileptiform discharges (seizure tendency) or focal slowing were seen     Significant Findings / Test Results:   MRI  brain -white matter changes suggestive of chronic microangiopathy.  No acute intracranial pathology  CTA head and neck-no acute intracranial hemorrhage mild microangiopathy.  Degree of ventricular dilatation is somewhat out of proportion to the degree of sulcal prominence similar to previous imaging.  No evidence of large vessel occlusion.  Sclerotic changes    Test Results Pending at Discharge (will require follow up):        Outpatient Tests " Requested:  B12 and folic acid    Complications:   none      Reason for Admission:     Hospital Course:   Roland Campbell is a 60 y.o. male patient who originally presented to the hospital on 5/15/2025 due to 2 episodes of near syncope and altered mental status since discharge from the hospital.  Patient with past medical history significant for tobacco abuse, dental caries status post tooth extraction on 5/5 on Augmentin, depression, hypertension, previous history of migraine, suspected TIA with recent admission from 5/2 to 5/8 for ischemic lower extremity with status post right lower extremity thrombectomy from an unclear etiology presented to the hospital for evaluation of 2 events.  Patient reported that there is happens with pressure in his head with subsequent confusion and slurred speech blurry vision and gait imbalance.  Patient also reports auditory and visual hallucination and then he sat down and fell asleep for hours..  Patient is aware of his confusion.  Patient was admitted to the hospital.  EEG was unremarkable MRI did not show any evidence of CVA.  Patient had extensive workup done in the past for his ischemic limb with KAREN and CTA.  Patient did not have any further symptoms while in the hospital.  Evaluated by neurology and recommended ambulatory EEG.  Will obtain Zio patch monitor to rule out cardiac arrhythmia.  Patient remained hemodynamically stable and afebrile will be discharged with outpatient follow-up.  For details refer to the chart          Please see above list of diagnoses and related plan for additional information.     Condition at Discharge: good    Discharge Day Visit / Exam:   Subjective: Patient seen and examined.  Neurology evaluation appreciated.  No evidence or symptoms since coming to the hospital.  Hey plan for ambulatory EEG noted.  Vitals: Blood Pressure: 147/78 (05/16/25 1537)  Pulse: 83 (05/16/25 1537)  Temperature: 97.7 °F (36.5 °C) (05/16/25 1537)  Temp Source: Oral  "(05/16/25 0204)  Respirations: 18 (05/16/25 1138)  Height: 5' 8\" (172.7 cm) (05/15/25 0523)  Weight - Scale: 81.6 kg (179 lb 14.3 oz) (05/15/25 0523)  SpO2: 98 % (05/16/25 1537)  Physical Exam  Constitutional:       General: He is not in acute distress.     Appearance: He is not ill-appearing.   HENT:      Head: Normocephalic and atraumatic.      Nose: Nose normal.     Eyes:      General: No scleral icterus.      Cardiovascular:      Rate and Rhythm: Normal rate and regular rhythm.      Heart sounds: No murmur heard.  Pulmonary:      Effort: Pulmonary effort is normal.      Breath sounds: Normal breath sounds.   Abdominal:      General: Abdomen is flat. There is no distension.      Tenderness: There is no abdominal tenderness.     Musculoskeletal:         General: No swelling.      Cervical back: Normal range of motion.     Skin:     General: Skin is warm.      Coloration: Skin is not jaundiced.     Neurological:      General: No focal deficit present.      Mental Status: He is alert. Mental status is at baseline.          Discussion with Family: Updated  (brother) at bedside.    Discharge instructions/Information to patient and family:   See after visit summary for information provided to patient and family.      Provisions for Follow-Up Care:  See after visit summary for information related to follow-up care and any pertinent home health orders.      Mobility at time of Discharge:   Basic Mobility Inpatient Raw Score: 20  JH-HLM Goal: 6: Walk 10 steps or more  JH-HLM Achieved: 8: Walk 250 feet ot more  HLM Goal achieved. Continue to encourage appropriate mobility.     Disposition:   Home    Planned Readmission: none    Discharge Medications:  See after visit summary for reconciled discharge medications provided to patient and/or family.      Administrative Statements   Discharge Statement:  I have spent a total time of 45 minutes in caring for this patient on the day of the visit/encounter. >30 " minutes of time was spent on: Documenting in the medical record, Reviewing / ordering tests, medicine, procedures  , and Communicating with other healthcare professionals .    **Please Note: This note may have been constructed using a voice recognition system**

## 2025-05-16 NOTE — CASE MANAGEMENT
Case Management Discharge Planning Note    Patient name Roland Campbell  [facility] [id number]  : 1965 Date 2025       Current Admission Date: 5/15/2025  Current Admission Diagnosis:Transient alteration of awareness   Patient Active Problem List    Diagnosis Date Noted    Transient alteration of awareness 05/15/2025    Abnormal CT of the head 2025    Dental caries 2025    Critical limb ischemia of right lower extremity (HCC) 2025    Acute renal failure (ARF) (HCC) 2025    Ilioinguinal neuralgia of right side 2022    Status post right inguinal hernia repair 2021    Hypertriglyceridemia 2017    Essential hypertension 2016    Alcohol abuse 2016    Cigarette nicotine dependence without complication 2016      LOS (days): 0  Geometric Mean LOS (GMLOS) (days):   Days to GMLOS:     OBJECTIVE:            Current admission status: Observation   Preferred Pharmacy:   [id number] [place] [facility] [address] [place] [address]  Phone: [phone] Fax: [phone]    [facility]  [place], [place] - [address]  [address]  [place] [address]  Phone: [phone] Fax: [phone]    Primary Care Provider: Cristian Hutton    Primary Insurance: [facility]  Secondary Insurance:     DISCHARGE DETAILS:    CM informed that patient wanted to speak to CM. CM met with patient at bedside. Patient informed CM that he lives with a friend and his friend is threatening to kick patient out. Patient reports that he has contacted a  and police and that police had followed up. CM provided with patient with list of room rentals at patient's request.

## 2025-05-16 NOTE — CONSULTS
Consultation - Neurology   Name: Roland Campbell 60 y.o. male I MRN: [id number]  [facility] Date of Admission: 5/15/2025   Date of Service: 2025 I Hospital Day: 0   Inpatient consult to Neurology  Consult performed by: SOUMYA Bynum  Consult ordered by: Hilda Foster MD      Physician Requesting Evaluation: Hilda Foster MD   Reason for Evaluation / Principal Problem: transient alteration of awareness. NPH    Assessment & Plan  Transient alteration of awareness  60 y.o. right handed male with tobacco use,dental caries s/p tooth extraction 25 on Augmentin, depression, HTN, remote history of migraines, possible TIA on asa, and recent admission -2025 s/p RLE thrombectomy 5/3/25 secondary to limb ischemia of unclear etiology on asa/statin and eliquis who  presented to [facility] ED 5/15/25 for evaluation of 2 episodes of near syncope since being discharged    Neuroimagin/15/25 CTA H/N:  CT Brain: No acute intracranial hemorrhage. Mild microangiopathy. No significant interval change. Once again, the degree of ventricular dilatation is somewhat out of proportion to the degree of sulcal prominence, similar to May 3, 2025. This may be seen in normal pressure hydrocephalus. Clinical correlation is recommended.  If further evaluation is indicated, MRI with diffusion weighted imaging could be performed, if there are no contraindications.     CT Angiography: Atherosclerotic changes. No evidence of large vessel occlusion.     5/15/25 Routine EEG:  Clinical Interpretation: This was a normal awake , drowsy, and asleep Routine EEG. No electrographic seizures, interictal epileptiform discharges (seizure tendency) or focal slowing were seen.      25 MRI brain wo:  White matter changes suggestive of chronic microangiopathy. No acute intracranial pathology.     Recommendations:  Unclear etiology of recurrent spells; suspect stress response, lower suspicion for seizure  Observe off  "AEDs  If symptoms continue to occur without alternate etiology could consider 72 hr ambulatory EEG  Outpatient cardiac event monitoring  Check B12, thiamine, folate levels  Continue to monitor and notify Neurology with any changes.  Medical management and correction of any metabolic or infectious disturbances per primary service.  Outpatient follow-up with general neurology    Roland Campbell will need follow-up in in 6 weeks with general neurology team for Other in 60 minute appointment. They will not require outpatient neurological testing.     History of Present Illness   Roland Campbell is a 60 y.o. right handed male with tobacco use,dental caries s/p tooth extraction 5/5/25 on Augmentin, depression, HTN, remote history of migraines, possible TIA on asa, and recent admission 5/2-5/8/2025 s/p RLE thrombectomy 5/3/25 secondary to limb ischemia of unclear etiology on asa/statin and eliquis who  presented to [facility] ED 5/15/25 for evaluation of 2 episodes of near syncope since being discharged. Patient reports one episode when he woke up confused and a second event that occurred while he was walking to a friends house just 2 blocks away however it took him an hour to get there. En route, he reported feeling \"pressure\" in his head followed by slurred speech, blurry vision, lightheadedness with gait imbalance, auditory and visual hallucination, and confusion/lost his way. He sat down and fell asleep and was awakened by a passerby and still reported symptoms. He was able to walk the rest of the way to his friends house where he slept for 14 hours. He seems to recall the episode and believes that his symptoms lasted about 1 hour. No evidence of tongue bite or reported incontinence. No personal or family history of seizure; no personal history of stroke or head trauma. He denies drug or alcohol use and has cut back on smoking. He reports adequate sleep and denies significant stressors other than recent hospitalization. No " "reported aphasia, focal sensorimotor deficit or vision loss. He does endorse, bifrontal \"tingling\", intermittent bilateral calf pain and SOB. Initial labs unremarkable and negative orthostatic VS. CTA head and neck concerning for possible NPH, EEG normal. MRI without acute intracranial findings. Neurology is consulted for further recommendations regarding recurrent episodes of transient alteration of awareness. At present denies he remains asymptomatic without recurrent episodes since admission.    Per care everywhere: Patient was seen by PCP 4/18/25  \"He expressed concerns of new concerning symptoms. On one occasion 1-1.5 months ago, he was woken up by his roommate and (although he was still half asleep) had stood up and developed blurred vision, double vision, slurred speech and felt \"disoriented.\" On the second occasion 10 days ago he [place] [employer] and he had sudden onset imbalance, lightheadedness, speech difficulty, generalized weakness..Friend said \"[I] didn't look like myself.\" Friend had to assist him out of the [employer] because he couldn't walk well. Following this episode he went home and rested for several hours. Upon awakening later in the evening, the symptoms had resolved although still \"felt off\" for a few days after. Aside from these two episodes, around September 2024 he recalls an instance where he couldn't walk straight but no other symptoms; lasted for 30 minutes before resolving. None of these instances led to syncope. None of these instances had prodromal symptoms (sweats, nausea, heat intolerance, shakes) or preceding high emotional states. Denies headache, chest pain/heaviness, dyspnea (at rest or exertion).\" ECHO, Holter monitor and MRI/MRI were ordered at that time.    Review of Systems   See HPI    Objective :  Temp:  [97.6 °F (36.4 °C)-98.6 °F (37 °C)] 98.6 °F (37 °C)  HR:  [54-67] 54  BP: (147-163)/(74-86) 156/85  Resp:  [18-20] 19  SpO2:  [95 %-99 %] 97 %  O2 " "Device: None (Room air)    Physical Exam  Vitals reviewed.   Constitutional:       General: He is not in acute distress.  HENT:      Head: Normocephalic and atraumatic.     Eyes:      General: Lids are normal.      Extraocular Movements: Extraocular movements intact.      Pupils: Pupils are equal, round, and reactive to light.     Pulmonary:      Effort: Pulmonary effort is normal.     Skin:     General: Skin is warm and dry.     Neurological:      Motor: Motor strength is normal.    Psychiatric:         Speech: Speech normal.     Neurological Exam  Mental Status  Awake, alert and oriented to person, place and time. Speech is normal. Language is fluent with no aphasia. Attention and concentration are normal.    Cranial Nerves  CN II: Right normal visual field. Left normal visual field.  CN III, IV, VI: Extraocular movements intact bilaterally. Normal lids and orbits bilaterally. Pupils equal round and reactive to light bilaterally.  CN VII:  Right: There is no facial weakness.  Left: There is no facial weakness.  CN XI:  Right: Trapezius strength is normal.  Left: Trapezius strength is normal.  CN XII: Tongue midline without atrophy or fasciculations.  Subjective \"tingling\" bilateral forehead.    Motor   Strength is 5/5 throughout all four extremities.    Sensory  Light touch is normal in upper and lower extremities. Temperature is normal in upper and lower extremities.     Coordination  Right: Finger-to-nose normal.Left: Finger-to-nose normal.    Gait    Deferred.      I personally reviewed pertinent labs and neuroimaging as noted above    VTE Prophylaxis: VTE covered by:  apixaban, Oral, 5 mg at 05/16/25 0813     Assessment, images and plan reviewed with Dr. Narayanan. Plan reviewed with patient and primary service. Please refer to attending attestation for additional recommendations  "

## 2025-05-16 NOTE — ASSESSMENT & PLAN NOTE
60 y.o. right handed male with tobacco use,dental caries s/p tooth extraction 25 on Augmentin, depression, HTN, remote history of migraines, possible TIA on asa, and recent admission -2025 s/p RLE thrombectomy 5/3/25 secondary to limb ischemia of unclear etiology on asa/statin and eliquis who  presented to \[facility]\"" ED 5/15/25 for evaluation of 2 episodes of near syncope since being discharged    Neuroimagin/15/25 CTA H/N:  CT Brain: No acute intracranial hemorrhage. Mild microangiopathy. No significant interval change. Once again, the degree of ventricular dilatation is somewhat out of proportion to the degree of sulcal prominence, similar to May 3, 2025. This may be seen in normal pressure hydrocephalus. Clinical correlation is recommended.  If further evaluation is indicated, MRI with diffusion weighted imaging could be performed, if there are no contraindications.     CT Angiography: Atherosclerotic changes. No evidence of large vessel occlusion.     5/15/25 Routine EEG:  Clinical Interpretation: This was a normal awake , drowsy, and asleep Routine EEG. No electrographic seizures, interictal epileptiform discharges (seizure tendency) or focal slowing were seen.      25 MRI brain wo:  White matter changes suggestive of chronic microangiopathy. No acute intracranial pathology.     Recommendations:  Unclear etiology of recurrent spells; suspect stress response, lower suspicion for seizure  Observe off AEDs  If symptoms continue to occur without alternate etiology could consider 72 hr ambulatory EEG  Outpatient cardiac event monitoring  Check B12, thiamine, folate levels  Continue to monitor and notify Neurology with any changes.  Medical management and correction of any metabolic or infectious disturbances per primary service.  Outpatient follow-up with general neurology

## 2025-05-16 NOTE — ASSESSMENT & PLAN NOTE
"Patient recently admitted here 5/2/2025 - 5/8/2025 with critical limb ischemia of the right lower extremity s/p thrombectomy  Since discharge had noted intermittent episodes of \"blacking out\" as though he was about to syncopize, additionally noted some episodes of confusion/disorientation with blurred vision, slurred speech, dizziness/lightheadedness followed by prolonged sleep.  Denies persisting focal deficits otherwise  Labs during ED evaluation without abnormality compared to prior, CTA head/neck without acute intracranial pathology or large vessel occlusion; persistent ventriculomegaly. Concerning for NPH was noted. Since no symptoms at the time patient was advised to follow with neuro as outpatient.   Orthostatics were negative  Recently had TTE 5/7/2025 revealing EF 60%, no visualized thrombus, no PFO  Monitor on telemetry, so far the telemetry monitoring was unremarkable will continue.  Possible arrhythmias  CT head is concerning for possible NPH.  Outpatient follow-up with neurology and neurosurgery  MRI brain reviewed-no acute CVA  EEG reviewed  Neurology evaluation appreciated-note atypical seizure history.  Plan for ambulatory EEG as outpatient  Cardiology referral for Zio patch monitoring as outpatient.  Inpatient telemetry was rather unrevealing  "

## 2025-05-19 ENCOUNTER — TELEPHONE (OUTPATIENT)
Age: 60
End: 2025-05-19

## 2025-05-19 NOTE — TELEPHONE ENCOUNTER
HFU/ SUE JASMIN/ Transient alteration of awareness     DC- HOME- 5/16/2025    ----- Message from SOUMYA Altamirano sent at 5/16/2025  2:30 PM EDT -----  Regarding: hfjayson  Roland Campbell will need follow-up in in 6 weeks with general neurology team for Other= ATTENDING  in 60 minute appointment. They will not require outpatient neurological testing

## 2025-05-19 NOTE — TELEPHONE ENCOUNTER
Pt called in to schedule his HFU appt. Pt was schedule to first available with  on 10/24/2025 @ 230pm. Appt was put on a wait list.     Pt also mentioned that his roommate threw out or misplaced his medication for his blood thinners from hospital. Pt no longer has the medication he is supposed to be taking.

## 2025-05-28 ENCOUNTER — OFFICE VISIT (OUTPATIENT)
Dept: VASCULAR SURGERY | Facility: CLINIC | Age: 60
End: 2025-05-28
Payer: MEDICARE

## 2025-05-28 VITALS
WEIGHT: 176 LBS | HEIGHT: 68 IN | HEART RATE: 98 BPM | SYSTOLIC BLOOD PRESSURE: 120 MMHG | DIASTOLIC BLOOD PRESSURE: 80 MMHG | OXYGEN SATURATION: 99 % | BODY MASS INDEX: 26.67 KG/M2

## 2025-05-28 DIAGNOSIS — G31.9 VENTRICULAR ENLARGEMENT DUE TO BRAIN ATROPHY (HCC): ICD-10-CM

## 2025-05-28 DIAGNOSIS — I70.221 CRITICAL LIMB ISCHEMIA OF RIGHT LOWER EXTREMITY (HCC): Primary | ICD-10-CM

## 2025-05-28 DIAGNOSIS — Z72.0 TOBACCO ABUSE: ICD-10-CM

## 2025-05-28 PROCEDURE — 99215 OFFICE O/P EST HI 40 MIN: CPT | Performed by: SURGERY

## 2025-05-28 NOTE — PROGRESS NOTES
Name: Roland Campbell      : 1965      MRN: [id number]  Encounter Provider: Hui Clemente MD  Encounter Date: 2025   Encounter department: [facility]  :  Assessment & Plan    Pt is a 59 yo M w/ HTN, HLD, tobacco abuse 1/2PPD, hx EtOH abuse (remote), presented with RLE pain and paresthesias s/p thrombectomy    Critical limb ischemia of right lower extremity (HCC)  -presented with RLE ischemia  -underwent angiogram w/ finding of TP trunk thrombus and PT only runoff; s/p successful thrombectomy 5/3/25 [facility]  -now w/ 2+ R PT pulse and denies claudication  -reviewed TTE: EF: 60%, grade I diastolic; no mention of thrombus  -reviewed EKG: sinus emory, LA enlargement, ST abnormality  -reviewed CTA chest: there is a small piece of thrombus in the descending thoracic aorta without any defect/disease in the adjacent wall; new splenic infarct;soft tissue density in the anterior mediastium  -given splenic infarct and thrombus in thoracic aorta, most likely central/cardiac etiology of thrombus; [facility]  -also referred to [facility]    Tobacco abuse  -was 1/2PPD, down to a couple cigs/day  -discussed need for complete cessation in the setting of thrombosis    Ventricular enlargement (HCC)  -noted on CT head  -concerning for NPH, particularly in the setting of difficulty walking; denies incontinance  -referred for neuro; upcoming appt    Medications  -continue ASA, statin, eliquis    History of Present Illness   HPI:    Patient presents for followup after recent hospitalizaiton.    Patient presented with acute RLE pain and paresthesias, s/p thrombectomy. Most likely embolic but etiology unknown.  Also had an episode of abnormal walking and talking upon awakening in April but didn't complete recommended workup.    He had a readmission overnight after two episodes of near syncope. Negative workup.    Currently reports some mild burning in his R foot.  He  "is able to walk 2 miles without claudication.  Has some general fatigue    Was 1/2PPD at admission. Now a couple/day.  Hx of EtOH abuse, not since '22 (very occasional now).      Roland Campbell is a 60 y.o. male who presents     Pt s/p thrombectomy 3 weeks ago + agram 05/03/2025. Pt states legs are doing well. Pt states he feels PHIL after walking 2 mi and rest a couple minutes. Pt denies coldness, numbness, tingling, swelling.     History obtained from: patient    Review of Systems   Cardiovascular:  Negative for leg swelling.   Skin:  Negative for wound.          Objective   /80 (BP Location: Right arm, Patient Position: Sitting, Cuff Size: Standard)   Pulse 98   Ht 5' 8\" (1.727 m)   Wt 79.8 kg (176 lb)   SpO2 99%   BMI 26.76 kg/m²      Physical Exam    Cardiovascular:      Rate and Rhythm: Normal rate and regular rhythm.      Pulses:           Radial pulses are 2+ on the right side and 2+ on the left side.        Dorsalis pedis pulses are 0 on the right side and 2+ on the left side.        Posterior tibial pulses are 2+ on the right side and 2+ on the left side.      Heart sounds: No murmur heard.  Pulmonary:      Effort: No respiratory distress.      Breath sounds: No wheezing or rales.     Musculoskeletal:      Right lower leg: No edema.      Left lower leg: No edema.             I have reviewed and made appropriate changes to the review of systems input by the medical assistant.    Vitals:    05/28/25 1410   BP: 120/80   BP Location: Right arm   Patient Position: Sitting   Cuff Size: Standard   Pulse: 98   SpO2: 99%   Weight: 79.8 kg (176 lb)   Height: 5' 8\" (1.727 m)       Problem List[1]    Past Surgical History[2]    Family History[3]    Social History     Socioeconomic History   • Marital status: Single     Spouse name: Not on file   • Number of children: Not on file   • Years of education: Not on file   • Highest education level: Not on file   Occupational History   • Not on file   Tobacco " Use   • Smoking status: Some Days   • Smokeless tobacco: Never   Substance and Sexual Activity   • Alcohol use: Yes   • Drug use: Never   • Sexual activity: Not on file   Other Topics Concern   • Not on file   Social History Narrative   • Not on file     Social Drivers of Health     Financial Resource Strain: Patient Declined (2/4/2025)    [facility]    Financial Insecurity    • In the last 12 months did you skip medications to save money?: Decline to Answer    • In the last 12 months was there a time when you needed to see a doctor but could not because of cost?: Decline to Answer   Food Insecurity: No Food Insecurity (5/15/2025)    Nursing - Inadequate Food Risk Classification    • Worried About Running Out of Food in the Last Year: Not on file    • Ran Out of Food in the Last Year: Not on file    • Ran Out of Food in the Last Year: Never true   Transportation Needs: No Transportation Needs (5/15/2025)    Nursing - Transportation Risk Classification    • Lack of Transportation: Not on file    • Lack of Transportation: No   Recent Concern: Transportation Needs - Unmet Transportation Needs (5/3/2025)    Nursing - Transportation Risk Classification    • Lack of Transportation: Not on file    • Lack of Transportation: Yes   Physical Activity: Not on file   Stress: Not on file   Social Connections: Socially Integrated (2/4/2025)    [facility]    Social Connection    • Do you often feel lonely?: No   Intimate Partner Violence: Unknown (5/15/2025)    Nursing IPS    • Feels Physically and Emotionally Safe: Not on file    • Physically Hurt by Someone: Not on file    • Humiliated or Emotionally Abused by Someone: Not on file    • Physically Hurt by Someone: No    • Hurt or Threatened by Someone: No   Housing Stability: Unknown (5/15/2025)    Nursing: Inadequate Housing Risk Classification    • Has Housing: Not on file    • Worried About Losing Housing: Not on  file    • Unable to Get Utilities: Not on file    • Unable to Pay for Housing in the Last Year: No    • Has Housin       Allergies[4]    Current Medications[5]           [1]  Patient Active Problem List  Diagnosis   • Status post right inguinal hernia repair   • Ilioinguinal neuralgia of right side   • Critical limb ischemia of right lower extremity (HCC)   • Essential hypertension   • Alcohol abuse   • Hypertriglyceridemia   • Tobacco abuse   • Acute renal failure (ARF) (HCC)   • Abnormal CT of the head   • Dental caries   • Transient alteration of awareness   • Ventricular enlargement due to brain atrophy (HCC)   [2]  Past Surgical History:  Procedure Laterality Date   • INCISION AND DRAINAGE INTRA ORAL ABSCESS Left 2025    Procedure: INCISION AND DRAINAGE  (I&D) WOUND ORAL;  Surgeon: Dandre St DMD;  Location: [facility];  Service: Maxillofacial   • IR LOWER EXTREMITY ANGIOGRAM  5/3/2025   • MULTIPLE TOOTH EXTRACTIONS Bilateral 2025    Procedure: EXTRACTION TEETH #4, 14, 15;  Surgeon: Dandre St DMD;  Location: [facility];  Service: Maxillofacial   • MT RPR 1ST INGUN HRNA AGE 5 YRS/> REDUCIBLE Right 2021    Procedure: REPAIR HERNIA INGUINAL;  Surgeon: Darin Mendoza DO;  Location: [facility];  Service: General   [3]  Family History  Problem Relation Name Age of Onset   • No Known Problems Mother     • No Known Problems Father     [4]  Allergies  Allergen Reactions   • Gabapentin Other (See Comments)     Suicidal Ideations   • Pollen Extract Allergic Rhinitis   [5]    Current Outpatient Medications:   •  amLODIPine (NORVASC) 10 mg tablet, Take 10 mg by mouth in the morning., Disp: , Rfl:   •  apixaban (Eliquis) 5 mg, Take 1 tablet (5 mg total) by mouth 2 (two) times a day, Disp: 60 tablet, Rfl: 1  •  aspirin (ECOTRIN LOW STRENGTH) 81 mg EC tablet, Take 81 mg by mouth in the morning., Disp: , Rfl:   •  atorvastatin (LIPITOR) 40 mg tablet, Take 1 tablet (40 mg total) by mouth daily with  dinner, Disp: 30 tablet, Rfl: 1  •  lisinopril (ZESTRIL) 30 mg tablet, Take 30 mg by mouth in the morning., Disp: , Rfl:

## 2025-06-09 ENCOUNTER — HOSPITAL ENCOUNTER (OUTPATIENT)
Dept: NEUROLOGY | Facility: CLINIC | Age: 60
Discharge: HOME | End: 2025-06-09
Attending: NURSE PRACTITIONER

## 2025-06-09 DIAGNOSIS — R40.4 TRANSIENT ALTERATION OF AWARENESS: ICD-10-CM

## 2025-06-10 ENCOUNTER — HOSPITAL ENCOUNTER (OUTPATIENT)
Dept: NEUROLOGY | Facility: CLINIC | Age: 60
Discharge: HOME | End: 2025-06-10
Attending: NURSE PRACTITIONER
Payer: MEDICARE

## 2025-06-10 DIAGNOSIS — R40.4 TRANSIENT ALTERATION OF AWARENESS: ICD-10-CM

## 2025-06-10 PROCEDURE — 95708 EEG WO VID EA 12-26HR UNMNTR: CPT

## 2025-06-10 PROCEDURE — 95719 EEG PHYS/QHP EA INCR W/O VID: CPT | Performed by: STUDENT IN AN ORGANIZED HEALTH CARE EDUCATION/TRAINING PROGRAM

## 2025-06-11 ENCOUNTER — HOSPITAL ENCOUNTER (OUTPATIENT)
Dept: NEUROLOGY | Facility: CLINIC | Age: 60
Discharge: HOME | End: 2025-06-11
Attending: NURSE PRACTITIONER
Payer: MEDICARE

## 2025-06-11 DIAGNOSIS — R40.4 TRANSIENT ALTERATION OF AWARENESS: ICD-10-CM

## 2025-06-11 PROCEDURE — 95719 EEG PHYS/QHP EA INCR W/O VID: CPT | Performed by: STUDENT IN AN ORGANIZED HEALTH CARE EDUCATION/TRAINING PROGRAM

## 2025-06-11 PROCEDURE — 95705 EEG W/O VID 2-12 HR UNMNTR: CPT

## 2025-06-12 ENCOUNTER — HOSPITAL ENCOUNTER (OUTPATIENT)
Dept: NEUROLOGY | Facility: CLINIC | Age: 60
End: 2025-06-12
Attending: NURSE PRACTITIONER
Payer: MEDICARE

## 2025-06-12 DIAGNOSIS — R40.4 TRANSIENT ALTERATION OF AWARENESS: ICD-10-CM

## 2025-06-12 PROCEDURE — 95708 EEG WO VID EA 12-26HR UNMNTR: CPT

## 2025-06-12 PROCEDURE — 95719 EEG PHYS/QHP EA INCR W/O VID: CPT | Performed by: STUDENT IN AN ORGANIZED HEALTH CARE EDUCATION/TRAINING PROGRAM

## (undated) DEVICE — SCD SEQUENTIAL COMPRESSION COMFORT SLEEVE MEDIUM KNEE LENGTH: Brand: KENDALL SCD

## (undated) DEVICE — ADHESIVE SKIN HIGH VISCOSITY EXOFIN 1ML

## (undated) DEVICE — CHLORAPREP HI-LITE 26ML ORANGE

## (undated) DEVICE — GLOVE INDICATOR PI UNDERGLOVE SZ 9 BLUE

## (undated) DEVICE — NEEDLE 25G X 1 1/2

## (undated) DEVICE — PLUMEPEN PRO 10FT

## (undated) DEVICE — COBAN 4 IN STERILE

## (undated) DEVICE — SUT MONOCRYL 4-0 PS-2 27 IN Y426H

## (undated) DEVICE — BETHLEHEM UNIVERSAL MINOR GEN: Brand: CARDINAL HEALTH

## (undated) DEVICE — PENROSE DRAIN, 18 X 3 8: Brand: CARDINAL HEALTH

## (undated) DEVICE — SUT VICRYL 3-0 SH 27 IN J416H

## (undated) DEVICE — MANDIBLE PACK: Brand: CARDINAL HEALTH

## (undated) DEVICE — SUT PDS II 2-0 SH 27 IN Z317H

## (undated) DEVICE — SURGICEL 2 X 3

## (undated) DEVICE — UNDYED BRAIDED (POLYGLACTIN 910), SYNTHETIC ABSORBABLE SUTURE: Brand: COATED VICRYL

## (undated) DEVICE — ASTOUND STANDARD SURGICAL GOWN, XXL: Brand: CONVERTORS

## (undated) DEVICE — DECANTER: Brand: UNBRANDED

## (undated) DEVICE — GLOVE SRG BIOGEL ORTHOPEDIC 7.5

## (undated) DEVICE — GLOVE SRG BIOGEL 9

## (undated) DEVICE — SUT VICRYL 0 CT-1 18 IN J740D

## (undated) DEVICE — ROSEBUD DISSECTORS: Brand: DEROYAL

## (undated) DEVICE — SUT CHROMIC 3-0 PS-2 27 1638H

## (undated) DEVICE — INTENDED FOR TISSUE SEPARATION, AND OTHER PROCEDURES THAT REQUIRE A SHARP SURGICAL BLADE TO PUNCTURE OR CUT.: Brand: BARD-PARKER SAFETY BLADES SIZE 15, STERILE